# Patient Record
Sex: FEMALE | Race: WHITE
[De-identification: names, ages, dates, MRNs, and addresses within clinical notes are randomized per-mention and may not be internally consistent; named-entity substitution may affect disease eponyms.]

---

## 2017-11-30 ENCOUNTER — HOSPITAL ENCOUNTER (OUTPATIENT)
Dept: HOSPITAL 56 - MW.SDS | Age: 82
Discharge: HOME | End: 2017-11-30
Attending: PAIN MEDICINE
Payer: MEDICARE

## 2017-11-30 DIAGNOSIS — Z79.899: ICD-10-CM

## 2017-11-30 DIAGNOSIS — Z88.0: ICD-10-CM

## 2017-11-30 DIAGNOSIS — G89.4: ICD-10-CM

## 2017-11-30 DIAGNOSIS — F32.9: ICD-10-CM

## 2017-11-30 DIAGNOSIS — F41.9: ICD-10-CM

## 2017-11-30 DIAGNOSIS — Z87.891: ICD-10-CM

## 2017-11-30 DIAGNOSIS — M96.1: ICD-10-CM

## 2017-11-30 DIAGNOSIS — M48.062: ICD-10-CM

## 2017-11-30 DIAGNOSIS — Z88.8: ICD-10-CM

## 2017-11-30 DIAGNOSIS — K21.9: ICD-10-CM

## 2017-11-30 DIAGNOSIS — E78.00: ICD-10-CM

## 2017-11-30 DIAGNOSIS — M46.88: Primary | ICD-10-CM

## 2017-11-30 DIAGNOSIS — Z98.890: ICD-10-CM

## 2017-11-30 DIAGNOSIS — Z79.82: ICD-10-CM

## 2017-11-30 DIAGNOSIS — Z86.010: ICD-10-CM

## 2017-11-30 DIAGNOSIS — Z88.1: ICD-10-CM

## 2017-11-30 DIAGNOSIS — I10: ICD-10-CM

## 2017-11-30 DIAGNOSIS — M53.3: ICD-10-CM

## 2017-11-30 DIAGNOSIS — G47.33: ICD-10-CM

## 2017-11-30 DIAGNOSIS — M43.16: ICD-10-CM

## 2017-11-30 DIAGNOSIS — Z99.89: ICD-10-CM

## 2017-11-30 DIAGNOSIS — Z90.710: ICD-10-CM

## 2017-11-30 DIAGNOSIS — Z98.1: ICD-10-CM

## 2017-11-30 PROCEDURE — G0260 INJ FOR SACROILIAC JT ANESTH: HCPCS

## 2017-11-30 PROCEDURE — 27096 INJECT SACROILIAC JOINT: CPT

## 2017-11-30 NOTE — OR
SURGEON:

Rosa Durant D.O.

 

DATE OF PROCEDURE:  2017

 

OR STAFF PRESENT:

1. LALITO Waddell.

2. SHAYY Heard RT.

 

WOUND CLASSIFICATION:

I.

 

PREOPERATIVE DIAGNOSES:

1. Right sacroiliac joint arthropathy.

2. Failed back surgery syndrome.

 

POSTOPERATIVE DIAGNOSES:

1. Right sacroiliac joint arthropathy.

2. Failed back surgery syndrome.

 

PROCEDURES PERFORMED:

1. Right sacroiliac joint injection.

2. Fluoroscopic guidance for needle placement.

3. Local with oral Valium for sedation.

 

SCREENING QUESTIONS:

The patient answered "No" to all the followin. Are you allergic to iodine, Betadine or latex?

2. Do you have a bleeding disorder?

3. Do you  have any joint replacements, heart valve replacements or a

    pacemaker?

4. Are you on any anti-inflammatories or blood thinners?

5. Do you have any current local or systemic infections?

 

MEDICAL NECESSITY:

This is a patient with a history of severe chronic  low back pain and sacroiliac

joint irritation with pain over the sacral sulcus and the buttocks region who

comes in for the above diagnostic and therapeutic procedure.  Please see medical

necessity note attached.  This procedure is being done in accordance with

guidelines as written by the [employer] (HAILEE).

 

DESCRIPTION OF PROCEDURE:

The patient had the procedure thoroughly explained including all possible risks,

benefits and alternatives.  Consent was signed in my clinic indicating

understanding and willingness to proceed.

 

The patient presented to the outpatient [facility] and was escorted to the

dressing room to disrobe and change into a hospital gown.  Preoperative vital

signs were taken and stable.  The patient reported that Valium was taken prior

to the procedure.

 

The patient was brought to the procedure room and placed in the prone position

on the procedure room table.  A pillow was placed under the hips in order to

flatten the lumbar lordosis.  The back was prepped with ChloraPrep and sterilely

draped.

 

All personnel in the operating room were dressed in appropriate attire including

surgical scrubs, head and shoe covers.  This was to ensure sterility while in

the treatment room.  During the time fluoroscopy was in use all personnel in the

operating room wore lead shields with thyroid collars.  Sterile technique was

used during the procedure.  The patient was awake and conversant throughout the

procedure.

 

The fluoroscope was positioned to provide an oblique view of the sacroiliac

joint.  There was no evidence of infection at the site of needle insertion.  The

skin was anesthetized with 2% Lidocaine with a sterile 27-gauge 1.5 inch needle.

Then under fluoroscopy  a 22-gauge 3.5 inch spinal needle was placed within the

sacroiliac joint in the lower one-third of the joint.  IsoVue-200 contrast dye

was injected under live fluoroscopy and no intravascular flow pattern was

observed.  After negative aspiration of heme, the following solution was

injected: 0.5% Ropivacaine, Celestone and 2% Lidocaine.

 

The patient tolerated the procedure well and vital signs were stable during and

after the procedure.

 

The staff escorted the patient to the recovery area and the patient was released

to home in stable condition after a brief stay in the recovery room monitored by

the nurse.  The patient was given both oral and written discharge and follow up

instructions.

 

Recommended follow up in two weeks.  The patient is able to contact the office

if there are any additional problems or questions in the meantime.  The patient

was given discharge instruction and verbalizes understanding including

understanding of those signs and symptoms that would require emergency care.

 

PREOPERATIVE PAIN:

8/10 with activity.

 

POSTOPERATIVE PAIN:

0/10 postop.

 

PLAN:

Follow up in the Pain Clinic in 3 weeks.

 

PRIMARY SURGEON:

 

 

SECONDARY SURGEON:

 

 

ASSISTANT:

 

 

REASON ASSISTANT WAS NECESSARY:

 

 

ROLE OF ASSISTANT:

 

 

 

LIZZIE HODGES

DD:  2017 13:19:42

DT:  2017 18:41:34

Job #:  [id number]

## 2018-01-26 ENCOUNTER — HOSPITAL ENCOUNTER (EMERGENCY)
Dept: HOSPITAL 56 - MW.ED | Age: 83
Discharge: HOME | End: 2018-01-26
Payer: MEDICARE

## 2018-01-26 VITALS — SYSTOLIC BLOOD PRESSURE: 190 MMHG | DIASTOLIC BLOOD PRESSURE: 92 MMHG
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
## 2018-01-26 DIAGNOSIS — Z88.0: ICD-10-CM

## 2018-01-26 DIAGNOSIS — Z87.891: ICD-10-CM

## 2018-01-26 DIAGNOSIS — Z88.5: ICD-10-CM

## 2018-01-26 DIAGNOSIS — S06.9X9A: Primary | ICD-10-CM

## 2018-01-26 DIAGNOSIS — S00.03XA: ICD-10-CM

## 2018-01-26 LAB
CHLORIDE SERPL-SCNC: 105 MMOL/L (ref 98–110)
SODIUM SERPL-SCNC: 143 MMOL/L (ref 136–146)

## 2018-01-26 PROCEDURE — 99285 EMERGENCY DEPT VISIT HI MDM: CPT

## 2018-01-26 PROCEDURE — 96374 THER/PROPH/DIAG INJ IV PUSH: CPT

## 2018-01-26 PROCEDURE — 36415 COLL VENOUS BLD VENIPUNCTURE: CPT

## 2018-01-26 PROCEDURE — 80053 COMPREHEN METABOLIC PANEL: CPT

## 2018-01-26 PROCEDURE — 71045 X-RAY EXAM CHEST 1 VIEW: CPT

## 2018-01-26 PROCEDURE — 72125 CT NECK SPINE W/O DYE: CPT

## 2018-01-26 PROCEDURE — 85025 COMPLETE CBC W/AUTO DIFF WBC: CPT

## 2018-01-26 PROCEDURE — 84484 ASSAY OF TROPONIN QUANT: CPT

## 2018-01-26 PROCEDURE — 70450 CT HEAD/BRAIN W/O DYE: CPT

## 2018-01-26 PROCEDURE — 93005 ELECTROCARDIOGRAM TRACING: CPT

## 2018-01-26 NOTE — EDM.PDOC
ED HPI GENERAL MEDICAL PROBLEM





- General


Chief Complaint: Head Injury


Stated Complaint: FALL


Time Seen by Provider: 01/26/18 17:25


Source of Information: Reports: Patient


History Limitations: Reports: No Limitations





- History of Present Illness


INITIAL COMMENTS - FREE TEXT/NARRATIVE: 


HISTORY AND PHYSICAL:





History of present illness:


Patient is an 85-year-old female who presents to the emergency room by EMS with 

complaints of fall. [place] ice, 

falling backwards and hit her head with a loss of consciousness. Upon arrival 

the patient is C-collared and backboarded in complaining of some upper chest 

pain where the straps of the board are resting and nausea. 





Review of systems: 


As per history of present illness and below otherwise all systems reviewed and 

negative.





Past medical history: 


As per history of present illness and as reviewed below otherwise 

noncontributory.





Surgical history: 


As per history of present illness and as reviewed below otherwise 

noncontributory.





Social history: 


No reported history of drug or alcohol abuse.





Family history: 


As per history of present illness and as reviewed below otherwise 

noncontributory.





Physical exam:


General: Nontoxic-appearing 85-year-old female. Alert and oriented. Appears in 

no acute distress


HEENT: No crepitus/tenderness with palpation of the scalp, normocephalic, 

pupils reactive bilat, negative for conjunctival pallor or scleral icterus, 

mucous membranes moist, [place], throat clear, neck supple, nontender, trachea 

midline.


Lungs: Clear to auscultation, breath sounds equal bilaterally, chest has 

reproducable chest pain to upper sternum and clavicle area.


Heart: S1S2, regular and rhythm, w/o murmur


Abdomen: Soft, nondistended, nontender. Negative for masses or 

hepatosplenomegaly. Negative for costovertebral tenderness.


Pelvis: Stable nontender.


Genitourinary: Deferred.


Rectal: Deferred.


Extremities: Moves all extremities per self, full ROM without difficulty or 

deficits, negative for cords or calf pain. Neurovascular unremarkable.


Neuro: Awake, alert, oriented. Cranial nerves II through XII unremarkable. 

Cerebellum unremarkable. Motor and sensory unremarkable throughout. Exam 

nonfocal.





Upon arrival patient was removed from the backboard log rolled with assist 3. 

Patient denies any pinpoint vertebral tenderness upon palpation. C-collar on 

and intact. After patient was removed from the backboard she stated the pain on 

her sternum where the straps were crossing has alleviated. The patient 

describes the fall she stated that she slipped on the ice. She denied any 

shortness of breath or chest pain upon the incident.





CBC, CMP and troponin are normal. EKG shows sinus rhythm. Chest x-ray shows no 

fractures, infiltrates or pneumonia. Head CT shows no acute fractures or 

intracranial findings. There is a small midline scalp hematoma. There was an 

incidental finding of a 3 mm ill-defined nodule in the left lung apex, they did 

recommend this be followed up in 12 months if the patient is at high risk. 

Patient does have a 10 year pack history of smoking and her early 20's. I did 

share this information with her and her daughter who is at bedside, she states 

she'll follow-up with her primary care provider. Vital signs stable.


Patient is requesting something for pain at this time. The daughter is driving 

the patient home. We'll give her one tablet of tramadol here. The daughter doesn

't live with the patient and states that she will monitor closely.





Diagnostics:


CBC, CMP, troponin, EKG, chest x-ray, head CT, CT cervical spine





Therapeutics:


Zofran





Impression: 


#1 Fall


#2 Scalp hematoma


#3 Head injury





Plan:


1. Please apply ice to the painful areas the first 24 hours. Then you may 

alternate ice and heat as desired. Take your medication as prescribed. May 

cause drowsiness so do not drive while taking this medication, reserve for 

nightime or evening use. Otherwise may use and/or ibuprofen.


2. Follow-up with your primary caregiver in the next couple days for 

reevaluation. Return to the ED as needed and as discussed.





Definitive disposition and diagnosis as appropriate pending reevaluation and 

review of above.





Onset: Today


Duration: Minutes:


Location: Reports: Chest


Associated Symptoms: Reports: Nausea/Vomiting.  Denies: Confusion, Chest Pain, 

Cough, cough w sputum, Diaphoresis, Fever/Chills, Headaches, Loss of Appetite, 

Malaise, Rash, Seizure, Shortness of Breath, Syncope, Weakness


  ** Bilateral Shoulder


Pain Score (Numeric/FACES): 7





- Related Data


 Allergies











Allergy/AdvReac Type Severity Reaction Status Date / Time


 


meperidine HCl [From Demerol] Allergy  Vomiting Verified 01/26/18 17:22


 


Penicillins Allergy  Rash Verified 01/26/18 17:22











Home Meds: 


 Home Meds





Albuterol [Ventolin HFA] 1 - 2 puff INH ASDIRECTED PRN 04/22/15 [History]


Esomeprazole Magnesium 1 tab PO ACBRK 04/22/15 [History]


Furosemide 1 tab PO ASDIRECTED 04/22/15 [History]


Levomilnacipran Hydrochloride [Fetzima] 1 tab PO DAILY 04/22/15 [History]


Levomilnacipran Hydrochloride [Fetzima] 40 mg PO BEDTIME 04/22/15 [History]


NIFEdipine [Nifedical Xl] 1 tab PO BEDTIME 04/22/15 [History]


Potassium Chloride 1 tab PO DAILY 04/22/15 [History]


Simvastatin 1 tab PO BEDTIME 04/22/15 [History]


Triamterene/Hydrochlorothiazid [Triamterene-HCTZ 75-50 MG] 1 tab PO BRK 04/22/

15 [History]


Esomeprazole [NexIUM] 40 mg PO DAILY 04/23/15 [History]











Social & Family History





- Tobacco Use


Smoking Status *Q: Former Smoker (QUIT AT LEAST 20 YEARS AGO)





- Alcohol Use


Days Per Week of Alcohol Use: 0


Number of Drinks Per Day: 0


Total Drinks Per Week: 0





- Recreational Drug Use


Recreational Drug Use: Yes


Drug Use in Last 12 Months: No





ED ROS GENERAL





- Review of Systems


Review Of Systems: ROS reveals no pertinent complaints other than HPI.





ED EXAM, HEAD INJURY





- Physical Exam


Exam: See Below (See dictation)





Course





- Vital Signs


Last Recorded V/S: 


 Last Vital Signs











Temp  98.5 F   01/26/18 17:17


 


Pulse  89   01/26/18 17:17


 


Resp  18   01/26/18 17:17


 


BP  190/92 H  01/26/18 17:17


 


Pulse Ox  99   01/26/18 17:17














- Orders/Labs/Meds


Labs: 


 Laboratory Tests











  01/26/18 01/26/18 Range/Units





  17:50 17:50 


 


WBC  9.96   (4.0-11.0)  K/uL


 


RBC  4.82   (4.30-5.90)  M/uL


 


Hgb  13.5   (12.0-16.0)  g/dL


 


Hct  40.2   (36.0-46.0)  %


 


MCV  83.4   (80.0-98.0)  fL


 


MCH  28.0   (27.0-32.0)  pg


 


MCHC  33.6   (31.0-37.0)  g/dL


 


RDW Std Deviation  50.2   (28.0-62.0)  fl


 


RDW Coeff of Brody  17 H   (11.0-15.0)  %


 


Plt Count  166   (150-400)  K/uL


 


MPV  10.60   (7.40-12.00)  fL


 


Neut % (Auto)  71.3   (48.0-80.0)  %


 


Lymph % (Auto)  19.4   (16.0-40.0)  %


 


Mono % (Auto)  4.0   (0.0-15.0)  %


 


Eos % (Auto)  5.1   (0.0-7.0)  %


 


Baso % (Auto)  0.2   (0.0-1.5)  %


 


Neut # (Auto)  7.1 H   (1.4-5.7)  K/uL


 


Lymph # (Auto)  1.9   (0.6-2.4)  K/uL


 


Mono # (Auto)  0.4   (0.0-0.8)  K/uL


 


Eos # (Auto)  0.5   (0.0-0.7)  K/uL


 


Baso # (Auto)  0.0   (0.0-0.1)  K/uL


 


Nucleated RBC %  0.0   /100WBC


 


Nucleated RBCs #  0   K/uL


 


Sodium   143  (136-146)  mmol/L


 


Potassium   3.6  (3.5-5.1)  mmol/L


 


Chloride   105  ()  mmol/L


 


Carbon Dioxide   28  (21-31)  mmol/L


 


BUN   19  (6.0-23.0)  mg/dL


 


Creatinine   0.9  (0.6-1.5)  mg/dL


 


Est Cr Clr Drug Dosing   42.78  mL/min


 


Estimated GFR (MDRD)   59.5  ml/min


 


Glucose   106  ()  mg/dL


 


Calcium   10.5  (8.8-10.8)  mg/dL


 


Total Bilirubin   0.9  (0.1-1.5)  mg/dL


 


AST   21  (5-40)  IU/L


 


ALT   15  (8-54)  IU/L


 


Alkaline Phosphatase   120  ()  


 


Troponin I   < 0.10  (0.0-0.29)  NG/ML


 


Total Protein   7.5  (6.0-8.0)  g/dL


 


Albumin   4.6  (3.4-4.8)  g/dL


 


Globulin   2.9  (2.0-3.5)  g/dL


 


Albumin/Globulin Ratio   1.6  (1.3-2.8)  











Meds: 


Medications














Discontinued Medications














Generic Name Dose Route Start Last Admin





  Trade Name Alphonseq  PRN Reason Stop Dose Admin


 


Ondansetron HCl  4 mg  01/26/18 17:32  01/26/18 18:10





  Zofran  IVPUSH  01/26/18 17:33  4 mg





  ONETIME ONE   Administration


 


Ondansetron HCl  Confirm  01/26/18 17:33  01/26/18 19:04





  Zofran  Administered  01/26/18 17:34  Not Given





  Dose   





  4 mg   





  .ROUTE   





  .STK-MED ONE   


 


Tramadol HCl  50 mg  01/26/18 19:13  01/26/18 19:17





  Ultram  PO  01/26/18 19:14  50 mg





  ONETIME ONE   Administration














Departure





- Departure


Time of Disposition: 19:17


Disposition: Home, Self-Care 01


Clinical Impression: 


 Hematoma





Fall


Qualifiers:


 Encounter type: initial encounter Qualified Code(s): W19.XXXA - Unspecified 

fall, initial encounter





Head injury, acute, with loss of consciousness


Qualifiers:


 Encounter type: initial encounter Qualified Code(s): S06.9X9A - Unspecified 

intracranial injury with loss of consciousness of unspecified duration, initial 

encounter








- Discharge Information


Instructions:  Head Injury, Adult, Easy-to-Read, Hematoma, Easy-to-Read


Referrals: 


PCP,None [Primary Care Provider] - 


Forms:  ED Department Discharge


Additional Instructions: 


My general discharge


The following information is given to patients seen in the emergency department 

who are being discharged to home. This information is to outline your options 

for follow-up care. We provide all patients seen in our emergency department 

with a follow-up referral.





The need for follow-up, as well as the timing and circumstances, are variable 

depending upon the specifics of your emergency department visit.





If you don't have a primary care physician on staff, we will provide you with a 

referral. We always advise you to contact your personal physician following an 

emergency department visit to inform them of the circumstance of the visit and 

for follow-up with them and/or the need for any referrals to a consulting 

specialist.





The emergency department will also refer you to a specialist when appropriate. 

This referral assures that you have the opportunity for follow-up care with a 

specialist. All of these measure are taken in an effort to provide you with 

optimal care, which includes your follow-up.





Under all circumstances we always encourage you to contact your private 

physician who remains a resource for coordinating your care. When calling for 

follow-up care, please make the office aware that this follow-up is from your 

recent emergency room visit. If for any reason you are refused follow-up, 

please contact the [facility] Emergency 

Department at [phone] and asked to speak to the emergency department 

charge nurse.





[facility]


Primary Care


[address] [place] [id number]


Phone: [phone]


Fax: [phone]





1. Please apply ice to the painful areas the first 24 hours. Then you may 

alternate ice and heat as desired. Take your medication as prescribed. Tramadol 

may cause drowsiness, so do not drive while taking this medication, reserve for 

nighttime or evening use. Otherwise may use and/or ibuprofen during the day.


2. Follow-up with your primary caregiver in the next couple days for 

reevaluation. Return to the ED as needed and as discussed.
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
## 2019-07-18 ENCOUNTER — HOSPITAL ENCOUNTER (OUTPATIENT)
Dept: HOSPITAL 56 - MW.SDS | Age: 84
Discharge: HOME | End: 2019-07-18
Attending: UROLOGY
Payer: MEDICARE

## 2019-07-18 VITALS — DIASTOLIC BLOOD PRESSURE: 78 MMHG | SYSTOLIC BLOOD PRESSURE: 136 MMHG

## 2019-07-18 DIAGNOSIS — F41.9: ICD-10-CM

## 2019-07-18 DIAGNOSIS — M06.9: ICD-10-CM

## 2019-07-18 DIAGNOSIS — N13.2: Primary | ICD-10-CM

## 2019-07-18 DIAGNOSIS — M48.062: ICD-10-CM

## 2019-07-18 DIAGNOSIS — Z99.89: ICD-10-CM

## 2019-07-18 DIAGNOSIS — I10: ICD-10-CM

## 2019-07-18 DIAGNOSIS — M18.11: ICD-10-CM

## 2019-07-18 DIAGNOSIS — E78.00: ICD-10-CM

## 2019-07-18 DIAGNOSIS — Z88.0: ICD-10-CM

## 2019-07-18 DIAGNOSIS — K59.09: ICD-10-CM

## 2019-07-18 DIAGNOSIS — Z88.5: ICD-10-CM

## 2019-07-18 DIAGNOSIS — K21.9: ICD-10-CM

## 2019-07-18 DIAGNOSIS — Z87.891: ICD-10-CM

## 2019-07-18 DIAGNOSIS — M43.16: ICD-10-CM

## 2019-07-18 DIAGNOSIS — Z79.899: ICD-10-CM

## 2019-07-18 DIAGNOSIS — F40.240: ICD-10-CM

## 2019-07-18 DIAGNOSIS — G47.33: ICD-10-CM

## 2019-07-18 DIAGNOSIS — F32.9: ICD-10-CM

## 2019-07-18 PROCEDURE — C1769 GUIDE WIRE: HCPCS

## 2019-07-18 PROCEDURE — A4217 STERILE WATER/SALINE, 500 ML: HCPCS

## 2019-07-18 PROCEDURE — 76000 FLUOROSCOPY <1 HR PHYS/QHP: CPT

## 2019-07-18 PROCEDURE — 52353 CYSTOURETERO W/LITHOTRIPSY: CPT

## 2019-07-18 NOTE — [id number]
Post Anesthesia Note





- EVALUATION WITHIN 48HRS OF ANESTHETIC


Vital Signs in Normal Range: Yes


Patient Participated in Evaluation: Yes


Respiratory Function Stable: Yes


Airway Patent: Yes


Cardiovascular Function Stable: Yes


Hydration Status Stable: Yes


Pain Control Satisfactory: Yes


Nausea and Vomiting Control Satisfactory: Yes


Mental Status Recovered: Yes


Resp Rate: 14





- COMMENTS/OBSERVATIONS


Free Text/Narrative:: 





no anesthesia problems

## 2019-07-18 NOTE — OR
SURGEON:

Bill Patel M.D.

 

DATE OF PROCEDURE:  07/18/2019

 

PREOPERATIVE DIAGNOSIS:

Two right ureteral stones.

 

POSTOPERATIVE DIAGNOSIS:

Two right ureteral stones.

 

OPERATION:

Ureteroscopy, laser lithotripsy.

 

DESCRIPTION OF PROCEDURE:

The patient was given general anesthesia.  She is in dorsal lithotomy position,

prepped and draped in sterile drapes.  Cystourethroscopy was done that was

normal.  A guidewire was attempted, would not go through the stones, so a

Glidewire was then placed and the lower ureter was dilated to approximately 15-

W[place].  The rigid ureteroscope was advanced in the right ureter and the stone

fragments behind UVJ were removed.  Going up into the ureter that had already

been dilated, I was able to reach the mid ureteral stones and those were broken

up with the laser and cleaned out.  With that done, the procedure was

terminated.  The bladder was emptied, and the patient was moved to recovery room

in good condition.

 

 

BEN / CARROLL

DD:  07/18/2019 09:29:42

DT:  07/18/2019 11:30:36

Job #:  [id number]

## 2019-07-18 NOTE — CR
EXAMINATION: Pelvis

 

HISTORY: Operative procedure

 

COMPARISON: 7/17/2019

 

TECHNIQUE: 2 views

 

FINDINGS/IMPRESSION: Operative control films demonstrate selection of the right

ureter.

## 2019-07-18 NOTE — PCM.PREANE
Preanesthetic Assessment





- Anesthesia/Transfusion/Family Hx


Anesthesia History: No Prior Anesthesia (slow to wake up)


Other Type of Anesthesia Reaction Comment: "Only time sick was with Ether and 

Demerol administered"


Family History of Anesthesia Reaction: No


Transfusion History: No Prior Transfusion(s)


Intubation History: Unknown





- Review of Systems


General: No Symptoms


Pulmonary: No Symptoms


Cardiovascular: No Symptoms


Gastrointestinal: Abdominal Pain


Neurological: No Symptoms


Other: Reports: None





- Physical Assessment


O2 Sat by Pulse Oximetry: 93


Respiratory Rate: 15


Vital Signs: 





 Last Vital Signs











Temp  36.1 C   07/18/19 07:02


 


Pulse  67   07/18/19 07:02


 


Resp  15   07/18/19 07:02


 


BP  136/92 H  07/18/19 07:02


 


Pulse Ox  93 L  07/18/19 07:02











Height: 5 ft 4 in


Weight: 66.678 kg


ASA Class: 3


Mental Status: Alert & Oriented x3


Airway Class: Mallampati = 2


Dentition: Reports: Normal Dentition


Thyro-Mental Finger Breadths: 3


Mouth Opening Finger Breadths: 2


ROM/Head Extension: Limited/Partial


Lungs: Clear to Auscultation, Normal Respiratory Effort, Crackles


Cardiovascular: Regular Rate, Regular Rhythm





- Allergies


Allergies/Adverse Reactions: 


 Allergies











Allergy/AdvReac Type Severity Reaction Status Date / Time


 


meperidine HCl [From Demerol] Allergy  Vomiting Verified 07/17/19 15:58


 


Penicillins Allergy  Rash Verified 07/17/19 15:58














- Blood


Blood Available: No





- Anesthesia Plan


Pre-Op Medication Ordered: None





- Acknowledgements


Anesthesia Type Planned: General Anesthesia





PreAnesthesia Questionnaire


HEENT History: Reports: Hard of Hearing, Other (See Below)


Other HEENT History: wears glasses,  has bilateral hearing aides


Cardiovascular History: Reports: High Cholesterol, Hypertension


Respiratory History: Reports: Sleep Apnea, SOB (with minimal exertion), Other (

See Below) (lung nodule)


Other Respiratory History: uses CPAP


Gastrointestinal History: Reports: GERD, Hiatal Hernia, Other (See Below) (h/o 

gastric polyp)


Genitourinary History: Reports: Renal Calculus, Urinary Incontinence


OB/GYN History: Reports: Pregnancy


Musculoskeletal History: Reports: Back Pain, Chronic, Fracture, Osteoarthritis


Other Musculoskeletal History: hx of fx wrist


Neurological History: Reports: Neuropathy, Peripheral, Other (See Below) (

positional vertigo)


Psychiatric History: Reports: Anxiety, Depression, Other (See Below) (

claustrophobia)


Dermatologic History: Reports: Other (See Below)


Other Dermatologic History: dermititis on scalp





- Infectious Disease History


Infectious Disease History: Reports: Chicken Pox, Measles, Mumps





- Past Surgical History


Head Surgeries/Procedures: Reports: None


HEENT Surgical History: Reports: Cataract Surgery, Naso-Sinus Surgery, 

Tonsillectomy


GI Surgical History: Reports: Colonoscopy, Hernia, Inguinal, Other (See Below)


Other GI Surgeries/Procedures: I&D of Perirectal abscess


Female  Surgical History: Reports: Hysterectomy, Salpingo-Oophorectomy


Neurological Surgical History: Reports: Lumbar Spine, Spinal Fusion


Other Neurological Surgeries/Procedures: L3-4-5,  has hardware


Musculoskeletal Surgical History: Reports: Arthroscopic Knee





- SUBSTANCE USE


Smoking Status *Q: Former Smoker (quit 20 years ago)


Recreational Drug Use History: No





- HOME MEDS


Home Medications: 


 Home Meds





Sertraline HCl [Zoloft] 100 mg PO DAILY 08/03/18 [History]


Cholecalciferol (Vitamin D3) [Vitamin D3] 1,000 unit PO DAILY 07/17/19 [History]


Cyanocobalamin (Vitamin B12) [Vitamin B12] 1,000 mcg PO DAILY 07/17/19 [History]


Esomeprazole Magnesium [Nexium 24Hr] 20 mg PO QAM 07/17/19 [History]


Multivitamin with Minerals [Hair, Skin and Nails] 1 tab PO DAILY 07/17/19 [

History]











- CURRENT (IN HOUSE) MEDS


Current Meds: 





 Current Medications





Lactated Ringer's (Ringers, Lactated)  1,000 mls @ 100 mls/hr IV ASDIRECTED CHRIS


Sodium Chloride (Saline Flush)  10 ml FLUSH ASDIRECTED PRN


   PRN Reason: Keep Vein Open


Sodium Chloride (Saline Flush)  2.5 ml FLUSH ASDIRECTED PRN


   PRN Reason: Keep Vein Open


Sodium Chloride (Normal Saline)  10 ml IV ASDIRECTED PRN


   PRN Reason: IV Use





Discontinued Medications





Ciprofloxacin/Dextrose 400 mg/ (Premix)  200 mls @ 200 mls/hr IV ONCALL ONE


   Stop: 07/18/19 01:00


Ciprofloxacin/Dextrose (Cipro In D5w 400 Mg/200 Ml) Confirm Administered Dose 

200 mls @ as directed .ROUTE .STK-MED ONE


   Stop: 07/18/19 06:40

## 2019-08-16 ENCOUNTER — HOSPITAL ENCOUNTER (EMERGENCY)
Dept: HOSPITAL 56 - MW.ED | Age: 84
Discharge: HOME | End: 2019-08-16
Payer: MEDICARE

## 2019-08-16 VITALS — DIASTOLIC BLOOD PRESSURE: 61 MMHG | SYSTOLIC BLOOD PRESSURE: 121 MMHG

## 2019-08-16 DIAGNOSIS — Z23: ICD-10-CM
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
## 2019-08-16 DIAGNOSIS — Z88.0: ICD-10-CM

## 2019-08-16 DIAGNOSIS — Z88.5: ICD-10-CM

## 2019-08-16 DIAGNOSIS — Z87.891: ICD-10-CM

## 2019-08-16 DIAGNOSIS — S91.131A: Primary | ICD-10-CM

## 2019-08-16 NOTE — EDM.PDOC
ED HPI GENERAL MEDICAL PROBLEM





- General


Chief Complaint: General


Stated Complaint: PT HURT RT FT


Time Seen by Provider: 08/16/19 18:36


Source of Information: Reports: Patient


History Limitations: Reports: No Limitations





- History of Present Illness


INITIAL COMMENTS - FREE TEXT/NARRATIVE: 





HISTORY AND PHYSICAL:





History of present illness:


Patient is an 81-year-old female presents to the ED with complaint of needing 

tdap. Patient states that she stepped on a small tac that is used to tac down 

carpet this afternoon. She called her PCP and is not UTD on her tetanus so was 

advised to come to ED to have this. Patient states it bled a tiny bit, she 

cleaned it out with alcohol. 





Review of systems: 


As per history of present illness and below otherwise all systems reviewed and 

negative.





Past medical history: 


As per history of present illness and as reviewed below otherwise 

noncontributory.





Surgical history: 


As per history of present illness and as reviewed below otherwise 

noncontributory.





Social history: 


No reported history of drug or alcohol abuse.





Family history: 


As per history of present illness and as reviewed below otherwise 

noncontributory.





Physical exam:


General: Patient sitting comfortably in no acute distress and nontoxic appearing


HEENT: Atraumatic, normocephalic, pupils reactive, negative for conjunctival 

pallor or scleral icterus, mucous membranes moist, throat clear, neck supple, 

nontender, trachea midline. No meningeal signs. 


Lungs: Clear to auscultation, breath sounds equal bilaterally, chest nontender.


Heart: S1S2, regular, negative for clicks, rubs, or overt murmur.


Abdomen: Soft, nondistended, nontender. Negative for masses or 

hepatosplenomegaly. Negative for costovertebral tenderness. No rigidity, rebound

, guarding.


Pelvis: Stable nontender.


Genitourinary: Deferred.


Rectal: Deferred.


Extremities: There is a tiny small superficial puncture wound in the pad of the 

right great toe.  negative for cords or calf pain. Neurovascular unremarkable.


Neuro: Awake, alert, oriented. Cranial nerves II through XII unremarkable. 

Cerebellum unremarkable. Motor and sensory unremarkable throughout. Exam 

nonfocal.





Notes: 





Diagnostics:


none





Therapeutics:


Td





Prescriptions:


none





Impression: 


foot injury





Plan:


Follow up with primary care provider


Return to ED as needed as discussed 





Definitive disposition and diagnosis as appropriate pending reevaluation and 

review of above.








- Related Data


 Allergies











Allergy/AdvReac Type Severity Reaction Status Date / Time


 


meperidine HCl [From Demerol] Allergy  Vomiting Verified 08/16/19 18:28


 


Penicillins Allergy  Rash Verified 08/16/19 18:28











Home Meds: 


 Home Meds





Sertraline HCl [Zoloft] 100 mg PO DAILY 08/03/18 [History]


Cholecalciferol (Vitamin D3) [Vitamin D3] 1,000 unit PO DAILY 07/17/19 [History]


Cyanocobalamin (Vitamin B12) [Vitamin B12] 1,000 mcg PO DAILY 07/17/19 [History]


Esomeprazole Magnesium [Nexium 24Hr] 20 mg PO QAM 07/17/19 [History]


Multivitamin with Minerals [Hair, Skin and Nails] 1 tab PO DAILY 07/17/19 [

History]











Past Medical History


HEENT History: Reports: Hard of Hearing, Other (See Below)


Other HEENT History: wears glasses,  has bilateral hearing aides


Cardiovascular History: Reports: High Cholesterol, Hypertension


Respiratory History: Reports: Sleep Apnea, SOB, Other (See Below)


Other Respiratory History: uses CPAP


Gastrointestinal History: Reports: GERD, Hiatal Hernia, Other (See Below)


Genitourinary History: Reports: Renal Calculus, Urinary Incontinence


OB/GYN History: Reports: Pregnancy


Musculoskeletal History: Reports: Back Pain, Chronic, Fracture, Osteoarthritis


Other Musculoskeletal History: hx of fx wrist


Neurological History: Reports: Neuropathy, Peripheral, Other (See Below)


Psychiatric History: Reports: Anxiety, Depression, Other (See Below)


Endocrine/Metabolic History: Reports: None


Hematologic History: Reports: None


Immunologic History: Reports: None


Oncologic (Cancer) History: Reports: None


Dermatologic History: Reports: Other (See Below)


Other Dermatologic History: dermititis on scalp





- Infectious Disease History


Infectious Disease History: Reports: Chicken Pox, Measles, Mumps





- Past Surgical History


Head Surgeries/Procedures: Reports: None


HEENT Surgical History: Reports: Cataract Surgery, Naso-Sinus Surgery, 

Tonsillectomy


Cardiovascular Surgical History: Reports: None


Respiratory Surgical History: Reports: None


GI Surgical History: Reports: Colonoscopy, Hernia, Inguinal, Other (See Below)


Other GI Surgeries/Procedures: I&D of Perirectal abscess


Female  Surgical History: Reports: Hysterectomy, Salpingo-Oophorectomy


Endocrine Surgical History: Reports: None


Neurological Surgical History: Reports: Lumbar Spine, Spinal Fusion


Other Neurological Surgeries/Procedures: L3-4-5,  has hardware


Musculoskeletal Surgical History: Reports: Arthroscopic Knee


Oncologic Surgical History: Reports: None


Dermatological Surgical History: Reports: None





Social & Family History





- Family History


Family Medical History: Noncontributory





- Tobacco Use


Smoking Status *Q: Former Smoker


Used Tobacco, but Quit: Yes


Month/Year Tobacco Last Used: 20 years





- Caffeine Use


Caffeine Use: Reports: Coffee





- Recreational Drug Use


Recreational Drug Use: No





ED ROS GENERAL





- Review of Systems


Review Of Systems: ROS reveals no pertinent complaints other than HPI.





ED EXAM, GENERAL





- Physical Exam


Exam: See Below (see dictation)





Course





- Vital Signs


Last Recorded V/S: 


 Last Vital Signs











Temp  96.7 F   08/16/19 18:26


 


Pulse  77   08/16/19 18:26


 


Resp  16   08/16/19 18:26


 


BP  121/61   08/16/19 18:26


 


Pulse Ox  94 L  08/16/19 18:26














- Orders/Labs/Meds


Orders: 


 Active Orders 24 hr











 Category Date Time Status


 


 Vaccines to be Administered [RC] PER UNIT ROUTINE Care  08/16/19 18:33 Ordered


 


 Vaccines to be Administered [RC] PER UNIT ROUTINE Care  08/16/19 18:54 Active











Meds: 


Medications














Discontinued Medications














Generic Name Dose Route Start Last Admin





  Trade Name Donato  PRN Reason Stop Dose Admin


 


Diphtheria/Tetanus/Acell Pertussis  0.5 ml  08/16/19 18:32  08/16/19 18:53





  Boostrix  IM  08/16/19 18:33  Not Given





  .ONCE ONE   





     





     





     





     


 


Tetanus/Diphtheria Toxoids  Confirm  08/16/19 18:38  08/16/19 18:53





  Tenivac  Administered  08/16/19 18:39  Not Given





  Dose   





  0.5 ml   





  .ROUTE   





  .STK-MED ONE   





     





     





     





     


 


Tetanus/Diphtheria Toxoids  0.5 ml  08/16/19 18:54  08/16/19 18:56





  Tenivac  IM  08/16/19 18:55  0.5 ml





  .ONCE ONE   Administration





     





     





     





     














Departure





- Departure


Time of Disposition: 18:45


Disposition: Home, Self-Care 01


Condition: Good


Clinical Impression: 


 Foot injury








- Discharge Information


Instructions:  VIS, Tetanus, Diphtheria (Td); Tetanus, Diphtheria, Pertussis (

Tdap) - [facility]


Referrals: 


Leo Guzman MD [Primary Care Provider] - 


Forms:  ED Department Discharge


Additional Instructions: 


The following information is given to patients seen in the emergency department 

who are being discharged to home. This information is to outline your options 

for follow-up care. We provide all patients seen in our emergency department 

with a follow-up referral.





The need for follow-up, as well as the timing and circumstances, are variable 

depending upon the specifics of your emergency department visit.





If you don't have a primary care physician on staff, we will provide you with a 

referral. We always advise you to contact your personal physician following an 

emergency department visit to inform them of the circumstance of the visit and 

for follow-up with them and/or the need for any referrals to a consulting 

specialist.





The emergency department will also refer you to a specialist when appropriate. 

This referral assures that you have the opportunity for follow-up care with a 

specialist. All of these measure are taken in an effort to provide you with 

optimal care, which includes your follow-up.





Under all circumstances we always encourage you to contact your private 

physician who remains a resource for coordinating your care. When calling for 

follow-up care, please make the office aware that this follow-up is from your 

recent emergency room visit. If for any reason you are refused follow-up, 

please contact the [facility] Emergency 

Department at [phone] and asked to speak to the emergency department 

charge nurse.





[facility]


Primary Care


[address] [place] [address]


Phone: [phone]


Fax: [phone]





[place] [address]


Phone: [phone]


Fax: [phone]














Follow up with primary care provider


Return to ED as needed as discussed 





- My Orders


Last 24 Hours: 


My Active Orders





08/16/19 18:33


Vaccines to be Administered [RC] PER UNIT ROUTINE 





08/16/19 18:54


Vaccines to be Administered [RC] PER UNIT ROUTINE 














- Assessment/Plan


Last 24 Hours: 


My Active Orders





08/16/19 18:33


Vaccines to be Administered [RC] PER UNIT ROUTINE 





08/16/19 18:54


Vaccines to be Administered [RC] PER UNIT ROUTINE

## 2021-08-04 NOTE — CT
Indication:



Shortness of breath



Technique:



Volumetric multidetector CT images of the chest were obtained after the 

administration of IV contrast.



100 cc Isovue 370 low osmolar intravenous contrast



Comparison:



Single-view chest August 4, 2021



Findings:



The thoracic inlet and thyroid gland are unremarkable.



The thoracic aorta is non aneurysmal with scattered atherosclerotic 

calcifications.



There is no central filling defect to suggest pulmonary embolism.



There are reactive mediastinal and hilar lymph nodes appreciated.



There is moderate central bronchial thickening with mucoid impaction of 

lower lobe bronchi.



There is extensive interlobular septal thickening with trace right greater 

than left basilar pleural effusions with adjacent compressive atelectasis 

versus infiltrates with additional scattered ground-glass opacities. There 

is mild biapical pleural thickening.



There is no evidence of pulmonary mass or suspicious pulmonary nodule.



The partially visualized upper abdomen demonstrates mild hepatomegaly and 

hepatic steatosis.



The thoracic vertebral body heights demonstrate a chronic vertebral plana 

deformity of the T11 level. There is trace retropulsion of the posterior 

cortex measuring 7 millimeters.



Impression:



Extensive interlobular septal thickening, central bronchial thickening with 

trace bibasilar effusions with adjacent compressive atelectasis versus 

infiltrates consistent with extensive pulmonary edema and/or atypical 

infiltrates. No evidence of pulmonary embolus.



Please note that all CT scans at this facility use dose modulation, 

iterative reconstruction, and/or weight-based dosing when appropriate to 

reduce radiation dose to as low as reasonably achievable.



Dictated by Lucian Bradley MD @ 8/4/2021 3:42:52 PM



Signed by Dr. Lucian Bradley @ Aug  4 2021  3:42PM

## 2021-08-04 NOTE — CR
INDICATION:



Shortness of breath



TECHNIQUE:



Chest 1 views



COMPARISON:



May 14, 2020



FINDINGS:



Cardiovascular and mediastinum:  Heart size and vasculature are normal in 

caliber and appearance.  



Lungs and pleural spaces:  Lungs are clear.  No sign of infiltrate or mass. 

 No sign of pleural effusion.  No pneumothorax.  



Bones and soft tissues:  No significant findings.



IMPRESSION:



No acute findings and no significant changes from the prior exam.



Dictated by Luis F Doyle MD @ 8/4/2021 1:42:01 PM



Signed by Dr. Luis F Doyle @ Aug  4 2021  1:42PM

## 2021-08-04 NOTE — PCM.HP.2
<John Garcia - Last Filed: 21 19:29>





H&P History of Present Illness





- General


Date of Service: 21


Admit Problem/Dx: 


                           Admission Diagnosis/Problem





Admission Diagnosis/Problem      Pulmonary edema








Source of Information: Patient, Family


History Limitations: Reports: No Limitations





- History of Present Illness


Initial Comments - Free Text/Narative: 


89-year-old female with history of hypertension and hyperlipidemia presents with

5 days of shortness of breath and dyspnea on exertion.  She also has a open 

wound in her left groin/inner thigh area.  Patient lives at home with her 

daughter.  She walks with a walker at home and since Saturday has been 

experiencing dyspnea on exertion and increasing lower extremity edema.  She 

states she has been sleeping with the head of the bed elevated for the last 5 

years.  She denies paroxysmal nocturnal dyspnea.  She denies chest pain, cough, 

palpitations, abdominal pain, fever, chills, dysuria and fatigue.  Patient 

noticed a sore on her left inner thigh 2 days ago which she believes was likely 

there prior to that.  It has since increased in size and started draining 

purulent discharge.  She denies it to be painful.  She also states she has 

neuropathy.  She denies history of diabetes mellitus.  She denies history of 

cellulitis.  She denies history of CHF or COPD.  Her PCP discontinued her 

antihypertensive medications roughly 5 years ago because she no longer required 

them.  Upon chart review in  she was on Lasix, nifedipine, simvastatin and 

hydrochlorothiazide which she is no longer on.





ER course: Hemoglobin 9.5.  WBC 7.4.  BUN 20.  Creatinine 0.8.  Glucose 115.  

.  D-dimer 2.36.  CT angio did not show pulmonary embolism but was 

positive for extensive pulmonary edema.  EKG is normal sinus rhythm.  Patient 

started on 2 L nasal cannula.  CT abdomen pelvis did not show deep space 

infection or abscess in her left thigh.  Wound cultures pending.  Patient 

received clindamycin 600 mg.  Patient received IV Lasix 40 mg once.  

Azithromycin and ceftriaxone.





Past medical history: HTN, HLD, ASHLEY, GERD, incontinence, neuropathy, anxiety and

depression





Medications: 


Sertraline 100 mg daily.


Vitamin D3 1000 units daily


Vitamin B12 1000 mcg daily


Nexium omeprazole 20 mg





Allergies: Penicillin causes a rash.  Meperidine causes nausea/vomiting.





Surgical history: Hysterectomy, spinal fusion, hardware L3-5.  Perirectal 

abscess I&D.





Family history: Father had CHF.  Son recently  of pancreatic cancer.  Other 

son tonsillar cancer.  Daughter brain cancer.





Social history: Smoked half a pack per day for 10 years, quit 50 years ago.  

History of very mild alcohol use, socially.  Denies drug use.





CODE STATUS: DNR/DNI.


  ** Left Shoulder


Pain Score (Numeric/FACES): 8





- Related Data


Allergies/Adverse Reactions: 


                                    Allergies











Allergy/AdvReac Type Severity Reaction Status Date / Time


 


meperidine HCl [From Demerol] Allergy  Vomiting Verified 21 19:54


 


Penicillins Allergy  Rash Verified 21 19:54











Home Medications: 


                                    Home Meds





Sertraline HCl [Zoloft] 100 mg PO BID 18 [History]


Cholecalciferol (Vitamin D3) [Vitamin D3] 1,000 unit PO DAILY 19 [History]


Cyanocobalamin (Vitamin B12) [Vitamin B12] 1,000 mcg PO DAILY 19 [History]


Esomeprazole Magnesium [Nexium 24Hr] 20 mg PO QAM 19 [History]


Multivitamin with Minerals [Hair, Skin and Nails] 1 tab PO DAILY 19 

[History]


Carbidopa/Levodopa [Carbidopa-Levodopa ] 37.5 - 150 mg PO TID 21 

[History]


Docusate Sodium 100 mg PO DAILY 21 [History]


Finasteride 1 mg PO DAILY 21 [History]


Gabapentin [Neurontin] 100 mg PO TID 21 [History]


Ibuprofen 200 mg PO Q6H PRN 21 [History]


Loratadine [Claritin] 10 mg PO DAILY PRN 21 [History]


Simvastatin 20 mg PO BEDTIME 21 [History]











Past Medical History


HEENT History: Reports: Hard of Hearing, Other (See Below)


Other HEENT History: wears glasses,  has bilateral hearing aides


Cardiovascular History: Reports: High Cholesterol, Hypertension


Respiratory History: Reports: Sleep Apnea, SOB, Other (See Below)


Other Respiratory History: uses CPAP


Gastrointestinal History: Reports: GERD, Hiatal Hernia, Other (See Below)


Genitourinary History: Reports: Renal Calculus, Urinary Incontinence


OB/GYN History: Reports: Pregnancy


Musculoskeletal History: Reports: Back Pain, Chronic, Fracture, Osteoarthritis


Other Musculoskeletal History: hx of fx wrist


Neurological History: Reports: Neuropathy, Peripheral, Other (See Below)


Psychiatric History: Reports: Anxiety, Depression, Other (See Below)


Endocrine/Metabolic History: Reports: None


Hematologic History: Reports: None


Immunologic History: Reports: None


Oncologic (Cancer) History: Reports: None


Dermatologic History: Reports: Other (See Below)


Other Dermatologic History: dermititis on scalp





- Infectious Disease History


Infectious Disease History: Reports: Chicken Pox, Measles, Mumps





- Past Surgical History


Head Surgeries/Procedures: Reports: None


HEENT Surgical History: Reports: Cataract Surgery, Naso-Sinus Surgery, 

Tonsillectomy


Cardiovascular Surgical History: Reports: None


Respiratory Surgical History: Reports: None


GI Surgical History: Reports: Colonoscopy, Hernia, Inguinal, Other (See Below)


Other GI Surgeries/Procedures: I&D of Perirectal abscess


Female  Surgical History: Reports: Hysterectomy, Salpingo-Oophorectomy


Endocrine Surgical History: Reports: None


Neurological Surgical History: Reports: Lumbar Spine, Spinal Fusion


Other Neurological Surgeries/Procedures: L3-4-5,  has hardware


Musculoskeletal Surgical History: Reports: Arthroscopic Knee


Oncologic Surgical History: Reports: None


Dermatological Surgical History: Reports: None





Social & Family History





- Family History


Family Medical History: No Pertinent Family History





- Caffeine Use


Caffeine Use: Reports: Coffee





- Recreational Drug Use


Recreational Drug Use: No





H&P Review of Systems





- Review of Systems:


Review Of Systems: See Below


General: Denies: Fever, Chills


HEENT: Denies: No Symptoms


Pulmonary: Reports: Shortness of Breath.  Denies: Wheezing, Pleuritic Chest 

Pain, Cough


Cardiovascular: Reports: Dyspnea on Exertion, Orthopnea, Edema.  Denies: Chest 

Pain, Palpitations, PND, Lightheadedness, Syncope, Claudication


Gastrointestinal: Reports: No Symptoms.  Denies: Abdominal Pain, Black Stool, 

Bloody Stool, Constipation, Diarrhea, Decreased Appetite, Difficulty Swallowing,

 Hematemesis, Hematochezia, Melena, Nausea, Vomiting


Genitourinary: Reports: Incontinence.  Denies: Dysuria, Frequency, Burning, 

Hematuria


Musculoskeletal: Denies: Neck Pain, Joint Pain, Joint Swelling


Psychiatric: Reports: Depression, Anxiety


Neurological: Denies: Confusion, Dizziness, Headache, Seizure, Change in Speech


Hematologic/Lymphatic: Denies: Easy Bleeding, Easy Bruising





Exam





- Exam


Exam: See Below





- Vital Signs


Vital Signs: 


                                Last Vital Signs











Temp  97.6 F   21 12:05


 


Pulse  96   21 18:25


 


Resp  18   21 15:17


 


BP  108/42 L  21 18:25


 


Pulse Ox  92 L  21 18:25











Weight: 69.853 kg





- Exam


Quality Assessment: Supplemental Oxygen


General: Alert, Oriented, Cooperative


HEENT: Posterior Pharynx Clear, PERRLA.  No: Scleral Icterus


Neck: Supple.  No: Lymphadenopathy, JVD


Lungs: Decreased Breath Sounds, Crackles


Cardiovascular: Regular Rate, Regular Rhythm


GI/Abdominal Exam: Normal Bowel Sounds, Soft, Non-Tender, No Distention.  No: 

Guarding, Rebound


Back Exam: No: CVA Tenderness (L), CVA Tenderness (R)


Extremities: Pedal Edema.  No: Joint Swelling, Duy's Sign, Leg Pain


Peripheral Pulses: 2+: Dorsalis Pedis (L), Dorsalis Pedis (R)


Skin: Wound (5 cm circular ulcerated lesion with 1 mm rim of surrounding 

erythema and darkening.  Draining purulent discharge.)


Neurological: Strength Equal Bilateral, Normal Speech


Neuro Extensive - Mental Status: Alert, Oriented x3, Normal Mood/Affect





- Patient Data


Lab Results Last 24 hrs: 


                         Laboratory Results - last 24 hr











  21 Range/Units





  13:05 13:05 13:05 


 


WBC  7.44    (4.0-11.0)  K/uL


 


RBC  3.38 L    (4.30-5.90)  M/uL


 


Hgb  9.5 L    (12.0-16.0)  g/dL


 


Hct  28.3 L    (36.0-46.0)  %


 


MCV  83.7    (80.0-98.0)  fL


 


MCH  28.1    (27.0-32.0)  pg


 


MCHC  33.6    (31.0-37.0)  g/dL


 


RDW Std Deviation  53.1    (28.0-62.0)  fl


 


RDW Coeff of Brody  17 H    (11.0-15.0)  %


 


Plt Count  175    (150-400)  K/uL


 


MPV  10.30    (7.40-12.00)  fL


 


Neut % (Auto)  85.0 H    (48.0-80.0)  %


 


Lymph % (Auto)  7.0 L    (16.0-40.0)  %


 


Mono % (Auto)  7.1    (0.0-15.0)  %


 


Eos % (Auto)  0.8    (0.0-7.0)  %


 


Baso % (Auto)  0.1    (0.0-1.5)  %


 


Neut # (Auto)  6.3 H    (1.4-5.7)  K/uL


 


Lymph # (Auto)  0.5 L    (0.6-2.4)  K/uL


 


Mono # (Auto)  0.5    (0.0-0.8)  K/uL


 


Eos # (Auto)  0.1    (0.0-0.7)  K/uL


 


Baso # (Auto)  0.0    (0.0-0.1)  K/uL


 


Nucleated RBC %  0.0    /100WBC


 


Nucleated RBCs #  0    K/uL


 


INR     


 


APTT     (18.6-31.3)  SEC


 


D-Dimer, Quantitative     (0.0-0.50)  mg/L FEU


 


Sodium   140   (136-145)  mmol/L


 


Potassium   3.9   (3.5-5.1)  mmol/L


 


Chloride   104   ()  mmol/L


 


Carbon Dioxide   25.9   (21.0-32.0)  mmol/L


 


BUN   20 H   (7.0-18.0)  mg/dL


 


Creatinine   0.8   (0.6-1.0)  mg/dL


 


Est Cr Clr Drug Dosing   44.63   mL/min


 


Estimated GFR (MDRD)   > 60.0   ml/min


 


Glucose   115 H   ()  mg/dL


 


Calcium   9.4   (8.5-10.1)  mg/dL


 


Magnesium   2.0   (1.8-2.4)  mg/dL


 


Total Bilirubin   1.6 H   (0.2-1.0)  mg/dL


 


AST   23   (15-37)  IU/L


 


ALT   20   (14-63)  IU/L


 


Alkaline Phosphatase   157 H   ()  U/L


 


Troponin I   < 0.050   (0.000-0.056)  ng/mL


 


B-Natriuretic Peptide    299 H  (<100)  PG/ML


 


Total Protein   7.1   (6.4-8.2)  g/dL


 


Albumin   3.1 L   (3.4-5.0)  g/dL


 


Globulin   4.0   (2.6-4.0)  g/dL


 


Albumin/Globulin Ratio   0.8 L   (0.9-1.6)  


 


Urine Color     


 


Urine Appearance     


 


Urine pH     (5.0-8.0)  


 


Ur Specific Gravity     (1.001-1.035)  


 


Urine Protein     (NEGATIVE)  mg/dL


 


Urine Glucose (UA)     (NEGATIVE)  mg/dL


 


Urine Ketones     (NEGATIVE)  mg/dL


 


Urine Occult Blood     (NEGATIVE)  


 


Urine Nitrite     (NEGATIVE)  


 


Urine Bilirubin     (NEGATIVE)  


 


Urine Ictotest     


 


Urine Urobilinogen     (<2.0)  EU/dL


 


Ur Leukocyte Esterase     (NEGATIVE)  


 


Urine RBC     (0-2/HPF)  


 


Urine WBC     (0-5/HPF)  


 


Ur Epithelial Cells     (NONE-FEW)  


 


Calcium Oxalate Crystal     (NEGATIVE)  


 


Urine Bacteria     (NEGATIVE)  














  21 Range/Units





  13:05 14:14 


 


WBC    (4.0-11.0)  K/uL


 


RBC    (4.30-5.90)  M/uL


 


Hgb    (12.0-16.0)  g/dL


 


Hct    (36.0-46.0)  %


 


MCV    (80.0-98.0)  fL


 


MCH    (27.0-32.0)  pg


 


MCHC    (31.0-37.0)  g/dL


 


RDW Std Deviation    (28.0-62.0)  fl


 


RDW Coeff of Brody    (11.0-15.0)  %


 


Plt Count    (150-400)  K/uL


 


MPV    (7.40-12.00)  fL


 


Neut % (Auto)    (48.0-80.0)  %


 


Lymph % (Auto)    (16.0-40.0)  %


 


Mono % (Auto)    (0.0-15.0)  %


 


Eos % (Auto)    (0.0-7.0)  %


 


Baso % (Auto)    (0.0-1.5)  %


 


Neut # (Auto)    (1.4-5.7)  K/uL


 


Lymph # (Auto)    (0.6-2.4)  K/uL


 


Mono # (Auto)    (0.0-0.8)  K/uL


 


Eos # (Auto)    (0.0-0.7)  K/uL


 


Baso # (Auto)    (0.0-0.1)  K/uL


 


Nucleated RBC %    /100WBC


 


Nucleated RBCs #    K/uL


 


INR  1.17   


 


APTT  27.2   (18.6-31.3)  SEC


 


D-Dimer, Quantitative  2.36 H   (0.0-0.50)  mg/L FEU


 


Sodium    (136-145)  mmol/L


 


Potassium    (3.5-5.1)  mmol/L


 


Chloride    ()  mmol/L


 


Carbon Dioxide    (21.0-32.0)  mmol/L


 


BUN    (7.0-18.0)  mg/dL


 


Creatinine    (0.6-1.0)  mg/dL


 


Est Cr Clr Drug Dosing    mL/min


 


Estimated GFR (MDRD)    ml/min


 


Glucose    ()  mg/dL


 


Calcium    (8.5-10.1)  mg/dL


 


Magnesium    (1.8-2.4)  mg/dL


 


Total Bilirubin    (0.2-1.0)  mg/dL


 


AST    (15-37)  IU/L


 


ALT    (14-63)  IU/L


 


Alkaline Phosphatase    ()  U/L


 


Troponin I    (0.000-0.056)  ng/mL


 


B-Natriuretic Peptide    (<100)  PG/ML


 


Total Protein    (6.4-8.2)  g/dL


 


Albumin    (3.4-5.0)  g/dL


 


Globulin    (2.6-4.0)  g/dL


 


Albumin/Globulin Ratio    (0.9-1.6)  


 


Urine Color   ORANGE  


 


Urine Appearance   CLEAR  


 


Urine pH   6.0  (5.0-8.0)  


 


Ur Specific Gravity   1.025  (1.001-1.035)  


 


Urine Protein   TRACE H  (NEGATIVE)  mg/dL


 


Urine Glucose (UA)   NEGATIVE  (NEGATIVE)  mg/dL


 


Urine Ketones   TRACE H  (NEGATIVE)  mg/dL


 


Urine Occult Blood   NEGATIVE  (NEGATIVE)  


 


Urine Nitrite   NEGATIVE  (NEGATIVE)  


 


Urine Bilirubin   SMALL H  (NEGATIVE)  


 


Urine Ictotest   NEGATIVE  


 


Urine Urobilinogen   4.0 H  (<2.0)  EU/dL


 


Ur Leukocyte Esterase   NEGATIVE  (NEGATIVE)  


 


Urine RBC   0-2  (0-2/HPF)  


 


Urine WBC   0-1  (0-5/HPF)  


 


Ur Epithelial Cells   RARE  (NONE-FEW)  


 


Calcium Oxalate Crystal   FEW  (NEGATIVE)  


 


Urine Bacteria   RARE  (NEGATIVE)  











Result Diagrams: 


                                 21 13:05





                                 21 13:05





Sepsis Event Note





- Evaluation


Sepsis Screening Result: No Definite Risk





- Focused Exam


Vital Signs: 


                                   Vital Signs











  Temp Pulse Resp BP Pulse Ox


 


 21 18:25   96   108/42 L  92 L


 


 21 16:48   86   125/58 L  99


 


 21 15:17   85  18  151/52 H  96


 


 21 13:27   86  18  133/45 L  94 L


 


 21 12:05  97.6 F  92  18  135/90  89 L














- Problem List


(1) Acute pulmonary edema


SNOMED Code(s): 27123995


   ICD Code: J81.0 - ACUTE PULMONARY EDEMA   Status: Acute   Current Visit: Yes 

  





(2) Infected ulcer of skin


SNOMED Code(s): 7886240


   ICD Code: L98.499 - NON-PRESSURE CHRONIC ULCER OF SKIN OF SITES W UNSP 

SEVERITY; L08.9 - LOCAL INFECTION OF THE SKIN AND SUBCUTANEOUS TISSUE, UNSP   

Status: Acute   Current Visit: Yes   


Problem List Initiated/Reviewed/Updated: Yes


Orders Last 24hrs: 


                               Active Orders 24 hr











 Category Date Time Status


 


 Patient Status [ADT] Routine ADT  21 17:23 Active


 


 EKG Documentation Completion [RC] STAT Care  21 12:26 Active


 


 Oxygen Therapy [RC] ASDIRECTED Care  21 12:28 Active


 


 Oxygen Therapy [RC] PRN Care  21 18:28 Ordered


 


 Pulse Oximetry [RC] ASDIRECTED Care  21 12:26 Active


 


 Up ad Cammie [RC] ASDIRECTED Care  21 18:28 Ordered


 


 VTE/DVT Education [RC] PER UNIT ROUTINE Care  21 18:28 Ordered


 


 Vital Signs [RC] Q4H Care  21 18:28 Ordered


 


 2 Gram Sodium Diet [DIET] Diet  21 Dinner Ordered


 


 BASIC METABOLIC PANEL,BMP [CHEM] AM Lab  21 05:11 Ordered


 


 CBC WITH AUTO DIFF [HEME] AM Lab  21 05:11 Ordered


 


 CULTURE, ANAEROBE & AEROBE [MREF] Stat Lab  21 17:40 Received


 


 MAGNESIUM [CHEM] AM Lab  21 05:11 Ordered


 


 PHOSPHORUS [CHEM] AM Lab  21 05:11 Ordered


 


 Acetaminophen [TylenoL] Med  21 18:28 Ordered





 650 mg PO Q4H PRN   


 


 Enoxaparin [Lovenox] Med  21 18:30 Ordered





 40 mg SUBCUT Q24H   


 


 Sodium Chloride 0.9% [Saline Flush] Med  21 12:26 Active





 10 ml FLUSH ASDIRECTED PRN   


 


 Sodium Chloride 0.9% [Saline Flush] Med  21 12:26 Active





 2.5 ml FLUSH ASDIRECTED PRN   


 


 polyethylene glycoL 3350 [MiraLAX] Med  21 18:28 Ordered





 17 gm PO BEDTIME PRN   


 


 Saline Lock Insert [OM.PC] Stat Oth  21 12:26 Ordered


 


 Resuscitation Status Routine Resus Stat  21 18:28 Ordered








                                Medication Orders





Acetaminophen (Acetaminophen 325 Mg Tab)  650 mg PO Q4H PRN


   PRN Reason: Pain (Mild 1-3)/fever


Enoxaparin Sodium (Enoxaparin 40 Mg/0.4 Ml Syringe)  40 mg SUBCUT Q24H CHRIS


Polyethylene Glycol (Polyethylene Glycol 3350 Powder 17 Gm Packet)  17 gm PO 

BEDTIME PRN


   PRN Reason: Constipation


Sodium Chloride (Sodium Chloride 0.9% 10 Ml Syringe)  10 ml FLUSH ASDIRECTED PRN


   PRN Reason: Keep Vein Open


   Last Admin: 21 13:19  Dose: 10 ml


   Documented by: KAY


Sodium Chloride (Sodium Chloride 0.9% 2.5 Ml Syringe)  2.5 ml FLUSH ASDIRECTED 

PRN


   PRN Reason: Keep Vein Open


   Last Admin: 21 13:19  Dose: 2.5 ml


   Documented by: KAY








Assessment/Plan Comment:: 


89-year-old female with history of hypertension and unclear history of CHF is b

[facility] admitted for fluid overload due to possible new onset CHF with elevated BNP

 and signs and symptoms of congestion including dyspnea on exertion, pedal edema

 and orthopnea.  Patient was previously on Lasix, nifedipine and 

hydrochlorothiazide until .  Unclear if she was diagnosed with CHF in the 

past.  CT angio showed extensive pulmonary edema.  Patient also has a infected 

skin ulcer located in her left groin/inner thigh.


Start patient on Lasix 40 mg IV twice daily.  De-escalate based on fluid status.

  We will get an echocardiogram.  Patient will be on telemetry. 2 L fluid 

restriction.  Strict I's and O's.  Daily weights.  2 g sodium diet restriction. 

 Consider starting aspirin and ACE inhibitor.


For the infected skin ulcer we will continue the patient on clindamycin IV.


DVT prophylaxis with Lovenox 40 mg.  Tylenol 650 mg every 4 hours as needed for 

pain.  MiraLAX as needed.  Daily labs.





CODE STATUS: DNR/DNI.








<Allyson Drummond - Last Filed: 21 14:38>





H&P History of Present Illness





- General


Admit Problem/Dx: 


                           Admission Diagnosis/Problem





Admission Diagnosis/Problem      Pulmonary edema











Exam





- Vital Signs


Vital Signs: 


                                Last Vital Signs











Temp  37.2 C   21 12:00


 


Pulse  85   21 12:00


 


Resp  20   21 12:00


 


BP  130/59 L  21 12:00


 


Pulse Ox  93 L  21 12:00














- Patient Data


Lab Results Last 24 hrs: 


                         Laboratory Results - last 24 hr











  21 Range/Units





  14:14 06:10 06:10 


 


WBC   6.12   (4.0-11.0)  K/uL


 


RBC   3.35 L   (4.30-5.90)  M/uL


 


Hgb   9.3 L   (12.0-16.0)  g/dL


 


Hct   27.9 L   (36.0-46.0)  %


 


MCV   83.3   (80.0-98.0)  fL


 


MCH   27.8   (27.0-32.0)  pg


 


MCHC   33.3   (31.0-37.0)  g/dL


 


RDW Std Deviation   52.7   (28.0-62.0)  fl


 


RDW Coeff of Brody   17 H   (11.0-15.0)  %


 


Plt Count   176   (150-400)  K/uL


 


MPV   10.10   (7.40-12.00)  fL


 


Neut % (Auto)   80.8 H   (48.0-80.0)  %


 


Lymph % (Auto)   9.3 L   (16.0-40.0)  %


 


Mono % (Auto)   8.0   (0.0-15.0)  %


 


Eos % (Auto)   1.6   (0.0-7.0)  %


 


Baso % (Auto)   0.3   (0.0-1.5)  %


 


Neut # (Auto)   4.9   (1.4-5.7)  K/uL


 


Lymph # (Auto)   0.6   (0.6-2.4)  K/uL


 


Mono # (Auto)   0.5   (0.0-0.8)  K/uL


 


Eos # (Auto)   0.1   (0.0-0.7)  K/uL


 


Baso # (Auto)   0.0   (0.0-0.1)  K/uL


 


Nucleated RBC %   0.0   /100WBC


 


Nucleated RBCs #   0   K/uL


 


Sodium    140  (136-145)  mmol/L


 


Potassium    3.1 L  (3.5-5.1)  mmol/L


 


Chloride    102  ()  mmol/L


 


Carbon Dioxide    27.9  (21.0-32.0)  mmol/L


 


BUN    19 H  (7.0-18.0)  mg/dL


 


Creatinine    0.9  (0.6-1.0)  mg/dL


 


Est Cr Clr Drug Dosing    39.67  mL/min


 


Estimated GFR (MDRD)    59.0  ml/min


 


Glucose    114 H  ()  mg/dL


 


Calcium    9.6  (8.5-10.1)  mg/dL


 


Phosphorus    3.7  (2.6-4.7)  mg/dL


 


Magnesium    1.8  (1.8-2.4)  mg/dL


 


Urine Ictotest  NEGATIVE    


 


Urine RBC  0-2    (0-2/HPF)  


 


Urine WBC  0-1    (0-5/HPF)  


 


Ur Epithelial Cells  RARE    (NONE-FEW)  


 


Calcium Oxalate Crystal  FEW    (NEGATIVE)  


 


Urine Bacteria  RARE    (NEGATIVE)  











Result Diagrams: 


                                 21 06:10





                                 21 06:10





Sepsis Event Note





- Focused Exam


Vital Signs: 


                                   Vital Signs











  Temp Pulse Resp BP Pulse Ox


 


 21 12:00  37.2 C  85  20  130/59 L  93 L


 


 21 08:00  36.8 C  78  20  114/57 L  92 L


 


 21 04:43  37.2 C  85  20  117/57 L  92 L











Orders Last 24hrs: 


                               Active Orders 24 hr











 Category Date Time Status


 


 Patient Status [ADT] Routine ADT  21 17:23 Active


 


 Daily Weight [Height and Weight] [RC] DAILY Care  21 19:17 Active


 


 Intake and Output Strict [RC] Q12H Care  21 19:16 Active


 


 Oxygen Therapy [RC] PRN Care  21 18:28 Active


 


 RT Aerosol Therapy [RC] ASDIRECTED Care  21 09:55 Active


 


 Telemetry Monitoring [Cardiac Monitoring] [RC] .AS Care  21 19:16 Active





 DIRECTED   


 


 Telemetry Monitoring [Cardiac Monitoring] [RC] Q8H Care  21 18:24 Active


 


 Up ad Cammie [RC] ASDIRECTED Care  21 18:28 Active


 


 VTE/DVT Education [RC] PER UNIT ROUTINE Care  21 18:28 Active


 


 Vital Signs [RC] Q4H Care  21 18:28 Active


 


 Consult to Wound Care Services [CONS] Routine Cons  21 10:34 Active


 


 PT Evaluation and Treatment [CONS] Routine Cons  21 09:54 Active


 


 2 Gram Sodium Diet [DIET] Diet  21 Dinner Active


 


 Fluid Restriction [DIET] Diet  21 Breakfast Active


 


 Echo Comp wo Cont [] Routine Exams  21 19:17 Taken


 


 CULTURE, ANAEROBE & AEROBE [MREF] Stat Lab  21 17:40 Received


 


 Acetaminophen [TylenoL] Med  21 18:28 Active





 650 mg PO Q4H PRN   


 


 Albuterol/Ipratropium [DuoNeb 3.0-0.5 MG/3 ML] Med  21 10:00 Active





 3 ml NEB Q4HRRT PRN   


 


 Carbidopa/Levodopa [Sinemet  mg] Med  21 08:00 Active





 1.5 tab PO TID   


 


 Clindamycin Phosphate in D5W [Cleocin in D5W 600 MG/50 Med  21 23:15 

Active





 ML] 50 ml   





 IV Q8H   


 


 Enoxaparin [Lovenox] Med  21 21:00 Active





 30 mg SUBCUT Q24H   


 


 Furosemide [Lasix] Med  21 05:00 Active





 40 mg IVPUSH Q12H   


 


 Gabapentin [Neurontin] Med  21 08:00 Active





 100 mg PO TID   


 


 LORazepam [Ativan] Med  21 23:06 Active





 0.5 mg IVPUSH ONETIME PRN   


 


 Pantoprazole [ProTONIX***] Med  21 07:30 Active





 40 mg PO ACBREAKFAST   


 


 Sertraline [Zoloft] Med  21 23:15 Active





 100 mg PO DAILY   


 


 polyethylene glycoL 3350 [MiraLAX] Med  21 18:28 Active





 17 gm PO BEDTIME PRN   


 


 Resuscitation Status Routine Resus Stat  21 18:28 Ordered








                                Medication Orders





Acetaminophen (Acetaminophen 325 Mg Tab)  650 mg PO Q4H PRN


   PRN Reason: Pain (Mild 1-3)/fever


Albuterol/Ipratropium (Albuterol/Ipratropium 3.0-0.5 Mg/3 Ml Neb Soln)  3 ml NEB

 Q4HRRT PRN


   PRN Reason: sob/wheezing


Carbidopa/Levodopa (Carbidopa/Levodopa  Mg Tab)  1.5 tab PO TID [facility]


   Last Admin: 21 08:32  Dose: 1.5 tab


   Documented by: CHINEDU


Enoxaparin Sodium (Enoxaparin 30 Mg/0.3 Ml Syringe)  30 mg SUBCUT Q24H [facility]


Furosemide (Furosemide 40 Mg/4 Ml Vial)  40 mg IVPUSH Q12H [facility]


   Last Admin: 21 04:47  Dose: 40 mg


   Documented by: CHRISTOPHER


Gabapentin (Gabapentin 100 Mg Cap)  100 mg PO TID [facility]


   Last Admin: 21 08:31  Dose: 100 mg


   Documented by: CHINEDU


Clindamycin Phosphate (Cleocin In D5w 600 Mg/50 Ml)  50 mls @ 100 mls/hr IV Q8H 

[facility]


   Last Admin: 21 06:32  Dose: 92.593 mls/hr


   Documented by: CHRISTOPHER


   Infusion: 21 23:54  Dose: 92.593 mls/hr


   Documented by: CHRISTOPHER


   Admin: 21 23:21  Dose: 92.593 mls/hr


   Documented by: CHRISTOPHER


Lorazepam (Lorazepam 2 Mg/Ml Sdv)  0.5 mg IVPUSH ONETIME PRN


   PRN Reason: Anxiety


Pantoprazole Sodium (Pantoprazole 40 Mg Tab.Cr)  40 mg PO ACBREAKFAST [facility]


   Last Admin: 21 06:32  Dose: 40 mg


   Documented by: CHRISTOPHER


Polyethylene Glycol (Polyethylene Glycol 3350 Powder 17 Gm Packet)  17 gm PO 

BEDTIME PRN


   PRN Reason: Constipation


Sertraline HCl (Sertraline 100 Mg Tab)  100 mg PO DAILY [facility]


   Last Admin: 21 08:31  Dose: 100 mg


   Documented by: CHINEDU


   Admin: 21 23:21  Dose: 100 mg


   Documented by: CHRISTOPHER


Sodium Chloride (Sodium Chloride 0.9% 10 Ml Syringe)  10 ml FLUSH ASDIRECTED PRN


   PRN Reason: Keep Vein Open


   Last Admin: 21 13:19  Dose: 10 ml


   Documented by: KAY


Sodium Chloride (Sodium Chloride 0.9% 2.5 Ml Syringe)  2.5 ml FLUSH ASDIRECTED 

PRN


   PRN Reason: Keep Vein Open


   Last Admin: 21 13:19  Dose: 2.5 ml


   Documented by: KAY








Assessment/Plan Comment:: 





I have seen and evaluated the patient and agree with the residents note unless 

specified in my note














I performed a history and physical exam of the patient and discussed management 

with resident. I have reviewed the residents note and agree with documented 

findings and plan unless otherwise specified in my note.

## 2021-08-04 NOTE — EDM.PDOC
ED HPI GENERAL MEDICAL PROBLEM





- General


Stated Complaint: SHORTNESS OF BREATH ON  OPENED SORE ON LEFT LEG


Time Seen by Provider: 08/04/21 11:35


Source of Information: Reports: Patient


History Limitations: Reports: No Limitations





- History of Present Illness


INITIAL COMMENTS - FREE TEXT/NARRATIVE: 





89-year-old female past medical history dementia presents for shortness of 

breath and sore on left inner thigh.  Patient is a good historian.  She notes 

that for the past 4 days she has been experiencing shortness of breath which is 

worse with exertion.  She also notes a achy pain in her midepigastrium.  She has

trouble distinguishing whether or not the pain is pleuritic.  She typically 

sleeps sitting up at baseline so she is uncertain if she is have any orthopnea. 

She does not have a history of CHF or COPD.  She does not wear oxygen at home.  

She does not take diuretics.  She has not noticed worsening swelling of her 

lower extremities.  She denies any abdominal pain, nausea, vomiting.  She also 

first noted 2 days ago a sore on her left inner thigh which seems to be getting 

bigger and today is noted to be surrounded by area of redness.  It is mildly 

tender to touch per patient.  She notes that it appears open and is draining 

purulent material.  She denies any fevers.


  ** Left Shoulder


Pain Score (Numeric/FACES): 8





- Related Data


                                    Allergies











Allergy/AdvReac Type Severity Reaction Status Date / Time


 


meperidine HCl [From Demerol] Allergy  Vomiting Verified 08/04/21 12:08


 


Penicillins Allergy  Rash Verified 08/04/21 12:08











Home Meds: 


                                    Home Meds





Sertraline HCl [Zoloft] 100 mg PO DAILY 08/03/18 [History]


Cholecalciferol (Vitamin D3) [Vitamin D3] 1,000 unit PO DAILY 07/17/19 [History]


Cyanocobalamin (Vitamin B12) [Vitamin B12] 1,000 mcg PO DAILY 07/17/19 [History]


Esomeprazole Magnesium [Nexium 24Hr] 20 mg PO QAM 07/17/19 [History]


Multivitamin with Minerals [Hair, Skin and Nails] 1 tab PO DAILY 07/17/19 

[History]











Past Medical History


HEENT History: Reports: Hard of Hearing, Other (See Below)


Other HEENT History: wears glasses,  has bilateral hearing aides


Cardiovascular History: Reports: High Cholesterol, Hypertension


Respiratory History: Reports: Sleep Apnea, SOB, Other (See Below)


Other Respiratory History: uses CPAP


Gastrointestinal History: Reports: GERD, Hiatal Hernia, Other (See Below)


Genitourinary History: Reports: Renal Calculus, Urinary Incontinence


OB/GYN History: Reports: Pregnancy


Musculoskeletal History: Reports: Back Pain, Chronic, Fracture, Osteoarthritis


Other Musculoskeletal History: hx of fx wrist


Neurological History: Reports: Neuropathy, Peripheral, Other (See Below)


Psychiatric History: Reports: Anxiety, Depression, Other (See Below)


Endocrine/Metabolic History: Reports: None


Hematologic History: Reports: None


Immunologic History: Reports: None


Oncologic (Cancer) History: Reports: None


Dermatologic History: Reports: Other (See Below)


Other Dermatologic History: dermititis on scalp





- Infectious Disease History


Infectious Disease History: Reports: Chicken Pox, Measles, Mumps





- Past Surgical History


Head Surgeries/Procedures: Reports: None


HEENT Surgical History: Reports: Cataract Surgery, Naso-Sinus Surgery, 

Tonsillectomy


Cardiovascular Surgical History: Reports: None


Respiratory Surgical History: Reports: None


GI Surgical History: Reports: Colonoscopy, Hernia, Inguinal, Other (See Below)


Other GI Surgeries/Procedures: I&D of Perirectal abscess


Female  Surgical History: Reports: Hysterectomy, Salpingo-Oophorectomy


Endocrine Surgical History: Reports: None


Neurological Surgical History: Reports: Lumbar Spine, Spinal Fusion


Other Neurological Surgeries/Procedures: L3-4-5,  has hardware


Musculoskeletal Surgical History: Reports: Arthroscopic Knee


Oncologic Surgical History: Reports: None


Dermatological Surgical History: Reports: None





Social & Family History





- Family History


Family Medical History: No Pertinent Family History





- Caffeine Use


Caffeine Use: Reports: Coffee





ED ROS GENERAL





- Review of Systems


Review Of Systems: Comprehensive ROS is negative, except as noted in HPI.





ED EXAM, GENERAL





- Physical Exam


Exam: See Below


Exam Limited By: No Limitations


General Appearance: Alert, WD/WN, No Apparent Distress


Ears: Hearing Grossly Normal


Throat/Mouth: Normal Voice, No Airway Compromise


Head: Atraumatic, Normocephalic


Neck: Normal Inspection


Respiratory/Chest: No Respiratory Distress, Lungs Clear, Normal Breath Sounds, 

No Accessory Muscle Use


Cardiovascular: Normal Peripheral Pulses, Regular Rate, Rhythm, Other (mild b/l 

LE pitting edema, symmetric)


GI/Abdominal: Soft, Non-Tender


Extremities: Normal Inspection


Neurological: Alert, Normal Cognition


Psychiatric: Normal Affect, Normal Mood


Skin Exam: Warm, Dry, Intact, Other (lesion on L inner thigh approximately 3-cm 

which appears to be an open furuncle draining purulent material; roughly 2-cm 

surroudnign ertyhema; no fluctuance appreciated)


  ** #1 Interpretation


EKG Date: 08/04/21


Time: 12:59


Rhythm: NSR


Rate (Beats/Min): 89


Axis: Normal


P-Wave: Present


QRS: Normal


ST-T: Normal


QT: Normal


PA/PQ Interval: 171


EKG Interpretation Comments: 





normal EKG





Course





- Vital Signs


Last Recorded V/S: 


                                Last Vital Signs











Temp  97.6 F   08/04/21 12:05


 


Pulse  85   08/04/21 15:17


 


Resp  18   08/04/21 15:17


 


BP  151/52 H  08/04/21 15:17


 


Pulse Ox  96   08/04/21 15:17














- Orders/Labs/Meds


Orders: 


                               Active Orders 24 hr











 Category Date Time Status


 


 EKG Documentation Completion [RC] STAT Care  08/04/21 12:26 Active


 


 Oxygen Therapy [RC] ASDIRECTED Care  08/04/21 12:28 Active


 


 Pulse Oximetry [RC] ASDIRECTED Care  08/04/21 12:26 Active


 


 CULTURE, ANAEROBE & AEROBE [MREF] Stat Lab  08/04/21 17:08 Ordered


 


 Azithromycin [Zithromax] 500 mg Med  08/04/21 17:08 Ordered





 Sodium Chloride 0.9% [Normal Saline (AdvBag)] 250 ml   





 IV ONETIME   


 


 Sodium Chloride 0.9% [Saline Flush] Med  08/04/21 12:26 Active





 10 ml FLUSH ASDIRECTED PRN   


 


 Sodium Chloride 0.9% [Saline Flush] Med  08/04/21 12:26 Active





 2.5 ml FLUSH ASDIRECTED PRN   


 


 cefTRIAXone [Rocephin in Dextrose,Iso-Osm 1 GM/50 ML] 1 Med  08/04/21 17:08 

Ordered





 gm   





 Premix Bag 1 bag   





 IV ONETIME   


 


 Saline Lock Insert [OM.PC] Stat Oth  08/04/21 12:26 Ordered








                                Medication Orders





Azithromycin 500 mg/ Sodium (Chloride)  250 mls @ 250 mls/hr IV ONETIME STA


   Stop: 08/04/21 18:07


Ceftriaxone Sodium/Dextrose 1 (gm/ Premix)  50 mls @ 100 mls/hr IV ONETIME ONE


   Stop: 08/04/21 17:37


Sodium Chloride (Sodium Chloride 0.9% 10 Ml Syringe)  10 ml FLUSH ASDIRECTED PRN


   PRN Reason: Keep Vein Open


   Last Admin: 08/04/21 13:19  Dose: 10 ml


   Documented by: KAY


Sodium Chloride (Sodium Chloride 0.9% 2.5 Ml Syringe)  2.5 ml FLUSH ASDIRECTED 

PRN


   PRN Reason: Keep Vein Open


   Last Admin: 08/04/21 13:19  Dose: 2.5 ml


   Documented by: KAY








Labs: 


                                Laboratory Tests











  08/04/21 08/04/21 08/04/21 Range/Units





  13:05 13:05 13:05 


 


WBC  7.44    (4.0-11.0)  K/uL


 


RBC  3.38 L    (4.30-5.90)  M/uL


 


Hgb  9.5 L    (12.0-16.0)  g/dL


 


Hct  28.3 L    (36.0-46.0)  %


 


MCV  83.7    (80.0-98.0)  fL


 


MCH  28.1    (27.0-32.0)  pg


 


MCHC  33.6    (31.0-37.0)  g/dL


 


RDW Std Deviation  53.1    (28.0-62.0)  fl


 


RDW Coeff of Brody  17 H    (11.0-15.0)  %


 


Plt Count  175    (150-400)  K/uL


 


MPV  10.30    (7.40-12.00)  fL


 


Neut % (Auto)  85.0 H    (48.0-80.0)  %


 


Lymph % (Auto)  7.0 L    (16.0-40.0)  %


 


Mono % (Auto)  7.1    (0.0-15.0)  %


 


Eos % (Auto)  0.8    (0.0-7.0)  %


 


Baso % (Auto)  0.1    (0.0-1.5)  %


 


Neut # (Auto)  6.3 H    (1.4-5.7)  K/uL


 


Lymph # (Auto)  0.5 L    (0.6-2.4)  K/uL


 


Mono # (Auto)  0.5    (0.0-0.8)  K/uL


 


Eos # (Auto)  0.1    (0.0-0.7)  K/uL


 


Baso # (Auto)  0.0    (0.0-0.1)  K/uL


 


Nucleated RBC %  0.0    /100WBC


 


Nucleated RBCs #  0    K/uL


 


INR     


 


APTT     (18.6-31.3)  SEC


 


D-Dimer, Quantitative     (0.0-0.50)  mg/L FEU


 


Sodium   140   (136-145)  mmol/L


 


Potassium   3.9   (3.5-5.1)  mmol/L


 


Chloride   104   ()  mmol/L


 


Carbon Dioxide   25.9   (21.0-32.0)  mmol/L


 


BUN   20 H   (7.0-18.0)  mg/dL


 


Creatinine   0.8   (0.6-1.0)  mg/dL


 


Est Cr Clr Drug Dosing   44.63   mL/min


 


Estimated GFR (MDRD)   > 60.0   ml/min


 


Glucose   115 H   ()  mg/dL


 


Calcium   9.4   (8.5-10.1)  mg/dL


 


Magnesium   2.0   (1.8-2.4)  mg/dL


 


Total Bilirubin   1.6 H   (0.2-1.0)  mg/dL


 


AST   23   (15-37)  IU/L


 


ALT   20   (14-63)  IU/L


 


Alkaline Phosphatase   157 H   ()  U/L


 


Troponin I   < 0.050   (0.000-0.056)  ng/mL


 


B-Natriuretic Peptide    299 H  (<100)  PG/ML


 


Total Protein   7.1   (6.4-8.2)  g/dL


 


Albumin   3.1 L   (3.4-5.0)  g/dL


 


Globulin   4.0   (2.6-4.0)  g/dL


 


Albumin/Globulin Ratio   0.8 L   (0.9-1.6)  


 


Urine Color     


 


Urine Appearance     


 


Urine pH     (5.0-8.0)  


 


Ur Specific Gravity     (1.001-1.035)  


 


Urine Protein     (NEGATIVE)  mg/dL


 


Urine Glucose (UA)     (NEGATIVE)  mg/dL


 


Urine Ketones     (NEGATIVE)  mg/dL


 


Urine Occult Blood     (NEGATIVE)  


 


Urine Nitrite     (NEGATIVE)  


 


Urine Bilirubin     (NEGATIVE)  


 


Urine Ictotest     


 


Urine Urobilinogen     (<2.0)  EU/dL


 


Ur Leukocyte Esterase     (NEGATIVE)  


 


Urine RBC     (0-2/HPF)  


 


Urine WBC     (0-5/HPF)  


 


Ur Epithelial Cells     (NONE-FEW)  


 


Calcium Oxalate Crystal     (NEGATIVE)  


 


Urine Bacteria     (NEGATIVE)  














  08/04/21 08/04/21 Range/Units





  13:05 14:14 


 


WBC    (4.0-11.0)  K/uL


 


RBC    (4.30-5.90)  M/uL


 


Hgb    (12.0-16.0)  g/dL


 


Hct    (36.0-46.0)  %


 


MCV    (80.0-98.0)  fL


 


MCH    (27.0-32.0)  pg


 


MCHC    (31.0-37.0)  g/dL


 


RDW Std Deviation    (28.0-62.0)  fl


 


RDW Coeff of Brody    (11.0-15.0)  %


 


Plt Count    (150-400)  K/uL


 


MPV    (7.40-12.00)  fL


 


Neut % (Auto)    (48.0-80.0)  %


 


Lymph % (Auto)    (16.0-40.0)  %


 


Mono % (Auto)    (0.0-15.0)  %


 


Eos % (Auto)    (0.0-7.0)  %


 


Baso % (Auto)    (0.0-1.5)  %


 


Neut # (Auto)    (1.4-5.7)  K/uL


 


Lymph # (Auto)    (0.6-2.4)  K/uL


 


Mono # (Auto)    (0.0-0.8)  K/uL


 


Eos # (Auto)    (0.0-0.7)  K/uL


 


Baso # (Auto)    (0.0-0.1)  K/uL


 


Nucleated RBC %    /100WBC


 


Nucleated RBCs #    K/uL


 


INR  1.17   


 


APTT  27.2   (18.6-31.3)  SEC


 


D-Dimer, Quantitative  2.36 H   (0.0-0.50)  mg/L FEU


 


Sodium    (136-145)  mmol/L


 


Potassium    (3.5-5.1)  mmol/L


 


Chloride    ()  mmol/L


 


Carbon Dioxide    (21.0-32.0)  mmol/L


 


BUN    (7.0-18.0)  mg/dL


 


Creatinine    (0.6-1.0)  mg/dL


 


Est Cr Clr Drug Dosing    mL/min


 


Estimated GFR (MDRD)    ml/min


 


Glucose    ()  mg/dL


 


Calcium    (8.5-10.1)  mg/dL


 


Magnesium    (1.8-2.4)  mg/dL


 


Total Bilirubin    (0.2-1.0)  mg/dL


 


AST    (15-37)  IU/L


 


ALT    (14-63)  IU/L


 


Alkaline Phosphatase    ()  U/L


 


Troponin I    (0.000-0.056)  ng/mL


 


B-Natriuretic Peptide    (<100)  PG/ML


 


Total Protein    (6.4-8.2)  g/dL


 


Albumin    (3.4-5.0)  g/dL


 


Globulin    (2.6-4.0)  g/dL


 


Albumin/Globulin Ratio    (0.9-1.6)  


 


Urine Color   ORANGE  


 


Urine Appearance   CLEAR  


 


Urine pH   6.0  (5.0-8.0)  


 


Ur Specific Gravity   1.025  (1.001-1.035)  


 


Urine Protein   TRACE H  (NEGATIVE)  mg/dL


 


Urine Glucose (UA)   NEGATIVE  (NEGATIVE)  mg/dL


 


Urine Ketones   TRACE H  (NEGATIVE)  mg/dL


 


Urine Occult Blood   NEGATIVE  (NEGATIVE)  


 


Urine Nitrite   NEGATIVE  (NEGATIVE)  


 


Urine Bilirubin   SMALL H  (NEGATIVE)  


 


Urine Ictotest   NEGATIVE  


 


Urine Urobilinogen   4.0 H  (<2.0)  EU/dL


 


Ur Leukocyte Esterase   NEGATIVE  (NEGATIVE)  


 


Urine RBC   0-2  (0-2/HPF)  


 


Urine WBC   0-1  (0-5/HPF)  


 


Ur Epithelial Cells   RARE  (NONE-FEW)  


 


Calcium Oxalate Crystal   FEW  (NEGATIVE)  


 


Urine Bacteria   RARE  (NEGATIVE)  











Meds: 


Medications











Generic Name Dose Route Start Last Admin





  Trade Name Freq  PRN Reason Stop Dose Admin


 


Azithromycin 500 mg/ Sodium  250 mls @ 250 mls/hr  08/04/21 17:08 





  Chloride  IV  08/04/21 18:07 





  ONETIME STA  


 


Ceftriaxone Sodium/Dextrose 1  50 mls @ 100 mls/hr  08/04/21 17:08 





  gm/ Premix  IV  08/04/21 17:37 





  ONETIME ONE  


 


Sodium Chloride  10 ml  08/04/21 12:26  08/04/21 13:19





  Sodium Chloride 0.9% 10 Ml Syringe  FLUSH   10 ml





  ASDIRECTED PRN   Administration





  Keep Vein Open  


 


Sodium Chloride  2.5 ml  08/04/21 12:26  08/04/21 13:19





  Sodium Chloride 0.9% 2.5 Ml Syringe  FLUSH   2.5 ml





  ASDIRECTED PRN   Administration





  Keep Vein Open  














Discontinued Medications














Generic Name Dose Route Start Last Admin





  Trade Name Freq  PRN Reason Stop Dose Admin


 


Furosemide  20 mg  08/04/21 16:53 





  Furosemide 20 Mg/2 Ml Vial  IVPUSH  08/04/21 16:54 





  ONETIME ONE  


 


Clindamycin Phosphate 600 mg/  50 mls @ 100 mls/hr  08/04/21 12:26  08/04/21 

13:19





  Premix  IV  08/04/21 12:55  100 mls/hr





  ONETIME ONE   Administration


 


Sodium Chloride  500 mls @ 999 mls/hr  08/04/21 14:47  08/04/21 15:13





  Normal Saline  IV  08/04/21 15:17  999 mls/hr





  .Bolus ONE   Administration


 


Iopamidol  100 ml  08/04/21 14:56  08/04/21 15:07





  Iopamidol 755 Mg/Ml 50 Ml Bottle  IV  08/04/21 14:57  100 ml





  ONETIME STA   Administration














- Re-Assessments/Exams


Free Text/Narrative Re-Assessment/Exam: 





08/04/21 13:59


Patient's labs are remarkable for markedly elevated D-dimer.  Will get CT a 

chest to rule out pulmonary embolism.  Will scan the abdomen and pelvis to 

capture the left inner thigh wound to assess for abscess or deep space 

infection.


08/04/21 16:52


Patient CT does not show evidence of pulmonary embolism but does show extensive 

pulmonary edema.  Patient is still requiring 2 L nasal cannula oxygen to 

maintain normal O2 sats.  Will give 40 mg Lasix now.  CT scan does not reveal 

any evidence of deep space infection or drainable abscess.  Patient has been 

given a dose of clindamycin in the emergency department.  At a very long 

discussion with the patient and her caretaker regarding the findings and 

necessity to stay in the hospital.  Patient really does not want to stay in the 

hospital.  I will give her a few moments to discuss it with caretaker and her 

daughter and disposition accordingly.


08/04/21 17:09


Spoke with hospitalist who agrees with plan to admit patient for cellulitis and 

pulmonary edema. 40 mg of Lasix has been given. Hospitalist request adding on 

ceftriaxone and azithromycin for potential pneumonia. This has been ordered. We 

also get a wound culture.





Departure





- Departure


Time of Disposition: 17:09


Disposition: Admitted As Inpatient 66


Condition: Good


Clinical Impression: 


Pulmonary edema


Qualifiers:


 Chronicity: acute Qualified Code(s): J81.0 - Acute pulmonary edema





Cellulitis


Qualifiers:


 Site of cellulitis: extremity Site of cellulitis of extremity: lower extremity 

Laterality: left Qualified Code(s): L03.116 - Cellulitis of left lower limb








- Discharge Information


Referrals: 


Leo Guzman MD [Primary Care Provider] - 





Sepsis Event Note (ED)





- Focused Exam


Vital Signs: 


                                   Vital Signs











  Temp Pulse Resp BP Pulse Ox


 


 08/04/21 15:17   85  18  151/52 H  96


 


 08/04/21 13:27   86  18  133/45 L  94 L


 


 08/04/21 12:05  97.6 F  92  18  135/90  89 L














- My Orders


Last 24 Hours: 


My Active Orders





08/04/21 12:26


EKG Documentation Completion [RC] STAT 


Pulse Oximetry [RC] ASDIRECTED 


Sodium Chloride 0.9% [Saline Flush]   10 ml FLUSH ASDIRECTED PRN 


Sodium Chloride 0.9% [Saline Flush]   2.5 ml FLUSH ASDIRECTED PRN 


Saline Lock Insert [OM.PC] Stat 





08/04/21 12:28


Oxygen Therapy [RC] ASDIRECTED 





08/04/21 17:08


CULTURE, ANAEROBE & AEROBE [MREF] Stat 


cefTRIAXone [Rocephin in Dextrose,Iso-Osm 1 GM/50 ML] 1 gm   Premix Bag 1 bag IV

ONETIME 





08/04/21 17:08


Azithromycin [Zithromax] 500 mg   Sodium Chloride 0.9% [Normal Saline (AdvBag)] 

250 ml IV ONETIME 














- Assessment/Plan


Last 24 Hours: 


My Active Orders





08/04/21 12:26


EKG Documentation Completion [RC] STAT 


Pulse Oximetry [RC] ASDIRECTED 


Sodium Chloride 0.9% [Saline Flush]   10 ml FLUSH ASDIRECTED PRN 


Sodium Chloride 0.9% [Saline Flush]   2.5 ml FLUSH ASDIRECTED PRN 


Saline Lock Insert [OM.PC] Stat 





08/04/21 12:28


Oxygen Therapy [RC] ASDIRECTED 





08/04/21 17:08


CULTURE, ANAEROBE & AEROBE [MREF] Stat 


cefTRIAXone [Rocephin in Dextrose,Iso-Osm 1 GM/50 ML] 1 gm   Premix Bag 1 bag IV

ONETIME 





08/04/21 17:08


Azithromycin [Zithromax] 500 mg   Sodium Chloride 0.9% [Normal Saline (AdvBag)] 

250 ml IV ONETIME

## 2021-08-04 NOTE — CT
INDICATION:



Shortness breath. Left groin wound.



TECHNIQUE:



CT abdomen and pelvis with intravenous contrast, 100 mL of Isovue-370. 

Coronal and sagittal formats.



COMPARISON:



CT 07/12/2019.



FINDINGS:



Interval development of small bilateral pleural effusions as well as a 

small volume pericardial effusion. Partial compressive atelectasis at the 

lung bases. Imaged lower lungs demonstrate smooth interlobular septal 

thickening, as may be seen with pulmonary edema.



Normal liver contour. No suspicious hepatic lesion. Portal and hepatic 

veins patent. No biliary dilatation. The gallbladder, pancreas, spleen, and 

adrenals are unremarkable.



Symmetric renal enhancement. Type small right renal cyst. Subcentimeter 

hypoattenuating lesion in the left upper kidney appears unchanged from 

prior but remains too small to accurately characterize. No hydronephrosis 

bilaterally. Decompressed urinary bladder.



The bowel appears normal in caliber and enhancement diffusely. Small hiatal 

hernia. No free-air, free-fluid, or focal collection. No lymphadenopathy. 

Normal caliber abdominal aorta with moderate atherosclerotic changes. Major 

branch vessels appear patent. 



Diffuse demineralization. T11 compression fracture with severe height loss 

is newly apparent from comparison CT but visualized on chest radiograph 

05/14/2020. Status post L3-L5 posterior spinal fusion.



Left inguinal extensive regional skin thickening and subcutaneous 

stranding. No focal collection or soft tissue gas. Multiple prominent left 

inguinal lymph nodes.



IMPRESSION:



1. Left inguinal soft tissue changes as may be seen with cellulitis in the 

appropriate clinical setting. No organized/drainable collection or soft 

tissue gas.



2. Pulmonary edema with small bilateral pleural effusions and a small 

pericardial effusion.



3. Chronic T11 vertebral compression fracture.



Dictated by Jason Multani MD @ 8/4/2021 4:40:12 PM



Please note that all CT scans at this facility use dose modulation, 

iterative reconstruction, and/or weight-based dosing when appropriate to 

reduce radiation dose to as low as reasonably achievable.



Dictated by: Jason Multani MD @ 08/04/2021 16:40:19



(Electronically Signed)

## 2021-08-05 NOTE — PCM.PN
<John Garcia - Last Filed: 08/05/21 17:55>





- General Info


Date of Service: 08/05/21


Admission Dx/Problem (Free Text): 


                           Admission Diagnosis/Problem





Admission Diagnosis/Problem      Pulmonary edema








Subjective Update: 


89-year-old female admitted for fluid overload pulmonary edema and infected skin

ulcer in her left groin on clindamycin 600 mg every 8 hours.  Patient is 

receiving Lasix 40 mg twice daily.  At night patient is on CPAP at home for ob

structive sleep apnea which she used last night.  Her saturation was noted to be

in the 80s so RT put her on 5 L.  Patient had one bowel movement.  Patient 

continues to have orthopnea but denies chest pain, palpitations, dizziness, 

confusion, leg pain, pedal edema, fever or chills.  Patient denies abdominal 

pain, diarrhea, nausea or vomiting.  Patient had her infected skin covered with 

gauze and states that it continues to be mildly painful.  Patient does appear to

have some weakness and is willing to work with physical therapy.








- Review of Systems


General: Denies: Fever, Chills


HEENT: Denies: Headaches, Visual Changes


Pulmonary: Denies: Shortness of Breath, Cough, Sputum, Wheezing


Cardiovascular: Reports: Orthopnea.  Denies: Chest Pain, Palpitations, PND, 

Edema, Lightheadedness


Gastrointestinal: Denies: Abdominal Pain, Constipation, Decreased Appetite, 

Diarrhea, Hematochezia, Melena


Genitourinary: Reports: Incontinence.  Denies: Dysuria, Frequency, Burning, 

Hematuria


Musculoskeletal: Denies: Leg Pain


Neurological: Denies: Confusion, Dizziness, Headache, Syncope





- Patient Data


Vitals - Most Recent: 


                                Last Vital Signs











Temp  99.0 F   08/05/21 12:00


 


Pulse  85   08/05/21 12:00


 


Resp  20   08/05/21 12:00


 


BP  130/59 L  08/05/21 12:00


 


Pulse Ox  93 L  08/05/21 12:00











Weight - Most Recent: 62.505 kg


I&O - Last 24 Hours: 


                                 Intake & Output











 08/05/21 08/05/21 08/05/21





 06:59 14:59 22:59


 


Intake Total 800  


 


Balance 800  











Lab Results Last 24 Hours: 


                         Laboratory Results - last 24 hr











  08/05/21 08/05/21 Range/Units





  06:10 06:10 


 


WBC  6.12   (4.0-11.0)  K/uL


 


RBC  3.35 L   (4.30-5.90)  M/uL


 


Hgb  9.3 L   (12.0-16.0)  g/dL


 


Hct  27.9 L   (36.0-46.0)  %


 


MCV  83.3   (80.0-98.0)  fL


 


MCH  27.8   (27.0-32.0)  pg


 


MCHC  33.3   (31.0-37.0)  g/dL


 


RDW Std Deviation  52.7   (28.0-62.0)  fl


 


RDW Coeff of Brody  17 H   (11.0-15.0)  %


 


Plt Count  176   (150-400)  K/uL


 


MPV  10.10   (7.40-12.00)  fL


 


Neut % (Auto)  80.8 H   (48.0-80.0)  %


 


Lymph % (Auto)  9.3 L   (16.0-40.0)  %


 


Mono % (Auto)  8.0   (0.0-15.0)  %


 


Eos % (Auto)  1.6   (0.0-7.0)  %


 


Baso % (Auto)  0.3   (0.0-1.5)  %


 


Neut # (Auto)  4.9   (1.4-5.7)  K/uL


 


Lymph # (Auto)  0.6   (0.6-2.4)  K/uL


 


Mono # (Auto)  0.5   (0.0-0.8)  K/uL


 


Eos # (Auto)  0.1   (0.0-0.7)  K/uL


 


Baso # (Auto)  0.0   (0.0-0.1)  K/uL


 


Nucleated RBC %  0.0   /100WBC


 


Nucleated RBCs #  0   K/uL


 


Sodium   140  (136-145)  mmol/L


 


Potassium   3.1 L  (3.5-5.1)  mmol/L


 


Chloride   102  ()  mmol/L


 


Carbon Dioxide   27.9  (21.0-32.0)  mmol/L


 


BUN   19 H  (7.0-18.0)  mg/dL


 


Creatinine   0.9  (0.6-1.0)  mg/dL


 


Est Cr Clr Drug Dosing   39.67  mL/min


 


Estimated GFR (MDRD)   59.0  ml/min


 


Glucose   114 H  ()  mg/dL


 


Calcium   9.6  (8.5-10.1)  mg/dL


 


Phosphorus   3.7  (2.6-4.7)  mg/dL


 


Magnesium   1.8  (1.8-2.4)  mg/dL











Med Orders - Current: 


                               Current Medications





Acetaminophen (Acetaminophen 325 Mg Tab)  650 mg PO Q4H PRN


   PRN Reason: Pain (Mild 1-3)/fever


Albuterol/Ipratropium (Albuterol/Ipratropium 3.0-0.5 Mg/3 Ml Neb Soln)  3 ml NEB

Q4HRRT PRN


   PRN Reason: sob/wheezing


Carbidopa/Levodopa (Carbidopa/Levodopa  Mg Tab)  1.5 tab PO TID [facility]


   Last Admin: 08/05/21 14:56 Dose:  1.5 tab


   Documented by: 


Enoxaparin Sodium (Enoxaparin 30 Mg/0.3 Ml Syringe)  30 mg SUBCUT Q24H [facility]


Furosemide (Furosemide 40 Mg/4 Ml Vial)  40 mg IVPUSH Q12H [facility]


   Last Admin: 08/05/21 04:47 Dose:  40 mg


   Documented by: 


Gabapentin (Gabapentin 100 Mg Cap)  100 mg PO TID [facility]


   Last Admin: 08/05/21 14:56 Dose:  100 mg


   Documented by: 


Clindamycin Phosphate (Cleocin In D5w 600 Mg/50 Ml)  50 mls @ 100 mls/hr IV Q8H 

[facility]


   Last Admin: 08/05/21 15:00 Dose:  92.593 mls/hr


   Documented by: 


Lorazepam (Lorazepam 2 Mg/Ml Sdv)  0.5 mg IVPUSH ONETIME PRN


   PRN Reason: Anxiety


Non-Formulary Medication (Multivitamin With Minerals [Hair, Skin And Nails])  1 

tab PO DAILY [facility]


Non-Formulary Medication (Cyanocobalamin (Vitamin B12))  1,000 mcg PO DAILY [facility]


Non-Formulary Medication (Cholecalciferol (Vitamin D3) [Vitamin D3])  1,000 unit

PO DAILY [facility]


Non-Formulary Medication (Docusate Sodium [Docusate Sodium])  100 mg PO DAILY 

[facility]


Pantoprazole Sodium (Pantoprazole 40 Mg Tab.Cr)  40 mg PO ACBREAKFAST [facility]


   Last Admin: 08/05/21 06:32 Dose:  40 mg


   Documented by: 


Esomeprazole 20 Mg  1 each PO QAM [facility]


Polyethylene Glycol (Polyethylene Glycol 3350 Powder 17 Gm Packet)  17 gm PO 

BEDTIME PRN


   PRN Reason: Constipation


Sertraline HCl (Sertraline 100 Mg Tab)  100 mg PO DAILY [facility]


   Last Admin: 08/05/21 08:31 Dose:  100 mg


   Documented by: 


Sodium Chloride (Sodium Chloride 0.9% 10 Ml Syringe)  10 ml FLUSH ASDIRECTED PRN


   PRN Reason: Keep Vein Open


   Last Admin: 08/04/21 13:19 Dose:  10 ml


   Documented by: 


Sodium Chloride (Sodium Chloride 0.9% 2.5 Ml Syringe)  2.5 ml FLUSH ASDIRECTED 

PRN


   PRN Reason: Keep Vein Open


   Last Admin: 08/04/21 13:19 Dose:  2.5 ml


   Documented by: 





Discontinued Medications





Enoxaparin Sodium (Enoxaparin 40 Mg/0.4 Ml Syringe)  40 mg SUBCUT Q24H [facility]


   Last Admin: 08/04/21 22:36 Dose:  40 mg


   Documented by: 


Furosemide (Furosemide 20 Mg/2 Ml Vial)  20 mg IVPUSH ONETIME ONE


   Stop: 08/04/21 16:54


   Last Admin: 08/04/21 18:27 Dose:  20 mg


   Documented by: 


Clindamycin Phosphate 600 mg/ (Premix)  50 mls @ 100 mls/hr IV ONETIME ONE


   Stop: 08/04/21 12:55


   Last Admin: 08/04/21 13:19 Dose:  100 mls/hr


   Documented by: 


Sodium Chloride (Normal Saline)  500 mls @ 999 mls/hr IV .Bolus ONE


   Stop: 08/04/21 15:17


   Last Admin: 08/04/21 15:13 Dose:  999 mls/hr


   Documented by: 


Azithromycin 500 mg/ Sodium (Chloride)  250 mls @ 250 mls/hr IV ONETIME STA


   Stop: 08/04/21 18:07


   Last Admin: 08/04/21 18:01 Dose:  250 mls/hr


   Documented by: 


Ceftriaxone Sodium/Dextrose 1 (gm/ Premix)  50 mls @ 100 mls/hr IV ONETIME ONE


   Stop: 08/04/21 17:37


   Last Admin: 08/04/21 18:00 Dose:  100 mls/hr


   Documented by: 


Clindamycin Phosphate 400 mg/ (Sodium Chloride)  52.6667 mls @ 100 mls/hr IV Q8H

[facility]


   Last Admin: 08/04/21 23:30 Dose:  Not Given


   Documented by: 


Furosemide 40 mg/ Sodium (Chloride)  54 mls @ 100 mls/hr IV Q12H [facility]


Iopamidol (Iopamidol 755 Mg/Ml 50 Ml Bottle)  100 ml IV ONETIME STA


   Stop: 08/04/21 14:57


   Last Admin: 08/04/21 15:07 Dose:  100 ml


   Documented by: 


Potassium Chloride (Potassium Chloride 20 Meq Tab.Er)  40 meq PO ONETIME ONE


   Stop: 08/05/21 08:02


   Last Admin: 08/05/21 08:30 Dose:  40 meq


   Documented by: 











- Exam


General: Alert, Oriented, Cooperative, No Acute Distress


Neck: Supple.  No: No JVD


Lungs: Crackles


Cardiovascular: Regular Rate, No Murmurs


GI/Abdominal Exam: Normal Bowel Sounds, Soft, Non-Tender, No Distention


Extremities: Normal Inspection, No Pedal Edema.  No: Joint Swelling, Duy's 

Sign, Leg Pain


Peripheral Pulses: 2+: Dorsalis Pedis (L), Dorsalis Pedis (R)


Wound/Incisions: Dressing Dry and Intact, Drainage, Erythema, Other (Left 

inguinal/inner thigh ulcer 5 cm draining purulent discharge.  No surrounding 

erythema.  Pain is)


Neurological: No New Focal Deficit





- Patient Data


Lab Results Last 24 hrs: 


                         Laboratory Results - last 24 hr











  08/05/21 08/05/21 Range/Units





  06:10 06:10 


 


WBC  6.12   (4.0-11.0)  K/uL


 


RBC  3.35 L   (4.30-5.90)  M/uL


 


Hgb  9.3 L   (12.0-16.0)  g/dL


 


Hct  27.9 L   (36.0-46.0)  %


 


MCV  83.3   (80.0-98.0)  fL


 


MCH  27.8   (27.0-32.0)  pg


 


MCHC  33.3   (31.0-37.0)  g/dL


 


RDW Std Deviation  52.7   (28.0-62.0)  fl


 


RDW Coeff of Brody  17 H   (11.0-15.0)  %


 


Plt Count  176   (150-400)  K/uL


 


MPV  10.10   (7.40-12.00)  fL


 


Neut % (Auto)  80.8 H   (48.0-80.0)  %


 


Lymph % (Auto)  9.3 L   (16.0-40.0)  %


 


Mono % (Auto)  8.0   (0.0-15.0)  %


 


Eos % (Auto)  1.6   (0.0-7.0)  %


 


Baso % (Auto)  0.3   (0.0-1.5)  %


 


Neut # (Auto)  4.9   (1.4-5.7)  K/uL


 


Lymph # (Auto)  0.6   (0.6-2.4)  K/uL


 


Mono # (Auto)  0.5   (0.0-0.8)  K/uL


 


Eos # (Auto)  0.1   (0.0-0.7)  K/uL


 


Baso # (Auto)  0.0   (0.0-0.1)  K/uL


 


Nucleated RBC %  0.0   /100WBC


 


Nucleated RBCs #  0   K/uL


 


Sodium   140  (136-145)  mmol/L


 


Potassium   3.1 L  (3.5-5.1)  mmol/L


 


Chloride   102  ()  mmol/L


 


Carbon Dioxide   27.9  (21.0-32.0)  mmol/L


 


BUN   19 H  (7.0-18.0)  mg/dL


 


Creatinine   0.9  (0.6-1.0)  mg/dL


 


Est Cr Clr Drug Dosing   39.67  mL/min


 


Estimated GFR (MDRD)   59.0  ml/min


 


Glucose   114 H  ()  mg/dL


 


Calcium   9.6  (8.5-10.1)  mg/dL


 


Phosphorus   3.7  (2.6-4.7)  mg/dL


 


Magnesium   1.8  (1.8-2.4)  mg/dL











Result Diagrams: 


                                 08/05/21 06:10





                                 08/05/21 06:10





Sepsis Event Note





- Evaluation


Sepsis Screening Result: No Definite Risk





- Focused Exam


Vital Signs: 


                                   Vital Signs











  Temp Pulse Resp BP Pulse Ox


 


 08/05/21 12:00  99.0 F  85  20  130/59 L  93 L


 


 08/05/21 08:00  98.3 F  78  20  114/57 L  92 L


 


 08/05/21 04:43  99 F  85  20  117/57 L  92 L














- Problem List & Annotations


(1) Acute pulmonary edema


SNOMED Code(s): 72004223


   Code(s): J81.0 - ACUTE PULMONARY EDEMA   Status: Acute   Current Visit: Yes  







(2) Infected ulcer of skin


SNOMED Code(s): 5319588


   Code(s): L98.499 - NON-PRESSURE CHRONIC ULCER OF SKIN OF SITES W UNSP 

SEVERITY; L08.9 - LOCAL INFECTION OF THE SKIN AND SUBCUTANEOUS TISSUE, UNSP   

Status: Acute   Current Visit: Yes   





- Problem List Review


Problem List Initiated/Reviewed/Updated: Yes





- My Orders


Last 24 Hours: 


My Active Orders





08/04/21 Dinner


2 Gram Sodium Diet [DIET] 





08/04/21 18:28


Oxygen Therapy [RC] PRN 


Up ad Cammie [RC] ASDIRECTED 


VTE/DVT Education [RC] PER UNIT ROUTINE 


Vital Signs [RC] Q4H 


Acetaminophen [TylenoL]   650 mg PO Q4H PRN 


polyethylene glycoL 3350 [MiraLAX]   17 gm PO BEDTIME PRN 


Resuscitation Status Routine 





08/04/21 19:16


Intake and Output Strict [RC] Q12H 


Telemetry Monitoring [Cardiac Monitoring] [RC] .AS DIRECTED 





08/04/21 19:17


Daily Weight [Height and Weight] [RC] DAILY 





08/05/21 05:00


Furosemide [Lasix]   40 mg IVPUSH Q12H 





08/05/21 Breakfast


Fluid Restriction [DIET] 





08/05/21 07:30


Pantoprazole [ProTONIX***]   40 mg PO ACBREAKFAST 





08/05/21 08:00


Carbidopa/Levodopa [Sinemet  mg]   1.5 tab PO TID 


Gabapentin [Neurontin]   100 mg PO TID 





08/05/21 19:17


Echo Comp wo Cont [US] Routine 





08/05/21 21:00


Enoxaparin [Lovenox]   30 mg SUBCUT Q24H 














- Plan


Plan:: 





I have seen and evaluated the patient and agree with the residents note unless 

specified in my note














I performed a history and physical exam of the patient and discussed management 

with resident. I have reviewed the residents note and agree with documented 

findings and plan unless otherwise specified in my note.








89-year-old female admitted for pulmonary edema and infected skin ulcer.  

Continue Lasix 40 mg twice daily and de-escalate as patient's fluid status 

allows.  We will downgrade her to 20 twice daily when her lungs are clear.  

Echocardiogram is pending.  Continue patient's home CPAP at night.  Patient 

potassium was 3.1 and was repleted with 40 mEq p.o.


Patient is on day 12 clindamycin 600 mg every 8 hours.  Patient WBC count within

 normal limits and no fevers.


Patient denied Ruiz catheter.  Patient will work with physical therapy today.  

We will follow up with the recommendations.  Duo nebs as needed.  We will also 

get a wound care consult.  Patient is urinating.  Patient had one bowel 

movement.  





<Allyson Drummond - Last Filed: 08/06/21 13:03>





- Patient Data


Vitals - Most Recent: 


                                Last Vital Signs











Temp  37.2 C   08/06/21 08:00


 


Pulse  80   08/06/21 08:00


 


Resp  17   08/06/21 08:00


 


BP  106/52 L  08/06/21 08:00


 


Pulse Ox  92 L  08/06/21 08:00











I&O - Last 24 Hours: 


                                 Intake & Output











 08/05/21 08/06/21 08/06/21





 22:59 06:59 14:59


 


Intake Total 770 500 


 


Output Total 850 50 


 


Balance -80 450 











Lab Results Last 24 Hours: 


                         Laboratory Results - last 24 hr











  08/06/21 08/06/21 08/06/21 Range/Units





  06:53 06:53 06:53 


 


WBC  5.64    (4.0-11.0)  K/uL


 


RBC  3.45 L    (4.30-5.90)  M/uL


 


Hgb  9.7 L    (12.0-16.0)  g/dL


 


Hct  29.0 L    (36.0-46.0)  %


 


MCV  84.1    (80.0-98.0)  fL


 


MCH  28.1    (27.0-32.0)  pg


 


MCHC  33.4    (31.0-37.0)  g/dL


 


RDW Std Deviation  53.1    (28.0-62.0)  fl


 


RDW Coeff of Brody  17 H    (11.0-15.0)  %


 


Plt Count  221    (150-400)  K/uL


 


MPV  10.60    (7.40-12.00)  fL


 


Neut % (Auto)  76.2    (48.0-80.0)  %


 


Lymph % (Auto)  14.2 L    (16.0-40.0)  %


 


Mono % (Auto)  8.3    (0.0-15.0)  %


 


Eos % (Auto)  1.1    (0.0-7.0)  %


 


Baso % (Auto)  0.2    (0.0-1.5)  %


 


Neut # (Auto)  4.3    (1.4-5.7)  K/uL


 


Lymph # (Auto)  0.8    (0.6-2.4)  K/uL


 


Mono # (Auto)  0.5    (0.0-0.8)  K/uL


 


Eos # (Auto)  0.1    (0.0-0.7)  K/uL


 


Baso # (Auto)  0.0    (0.0-0.1)  K/uL


 


Nucleated RBC %  0.0    /100WBC


 


Nucleated RBCs #  0    K/uL


 


Sodium   138   (136-145)  mmol/L


 


Potassium   3.2 L   (3.5-5.1)  mmol/L


 


Chloride   100   ()  mmol/L


 


Carbon Dioxide   30.4   (21.0-32.0)  mmol/L


 


BUN   22 H   (7.0-18.0)  mg/dL


 


Creatinine   1.1 H   (0.6-1.0)  mg/dL


 


Est Cr Clr Drug Dosing   32.46   mL/min


 


Estimated GFR (MDRD)   46.8   ml/min


 


Glucose   143 H   ()  mg/dL


 


Calcium   9.9   (8.5-10.1)  mg/dL


 


Magnesium    1.8  (1.8-2.4)  mg/dL











Med Orders - Current: 


                               Current Medications





Acetaminophen (Acetaminophen 325 Mg Tab)  650 mg PO Q4H PRN


   PRN Reason: Pain (Mild 1-3)/fever


Albuterol/Ipratropium (Albuterol/Ipratropium 3.0-0.5 Mg/3 Ml Neb Soln)  3 ml NEB

 Q4HRRT PRN


   PRN Reason: sob/wheezing


   Last Admin: 08/05/21 21:20 Dose:  3 ml


   Documented by: 


Carbidopa/Levodopa (Carbidopa/Levodopa  Mg Tab)  1.5 tab PO TID [facility]


   Last Admin: 08/06/21 06:05 Dose:  1.5 tab


   Documented by: 


Cholecalciferol (Cholecalciferol (Vitamin D3) 25 Mcg Tab)  25 mcg PO DAILY [facility]


   Last Admin: 08/06/21 08:14 Dose:  25 mcg


   Documented by: 


Cyanocobalamin (Cyanocobalamin (Vitamin B12) 500 Mcg Tab)  1,000 mcg PO DAILY 

[facility]


   Last Admin: 08/06/21 08:13 Dose:  1,000 mcg


   Documented by: 


Docusate Sodium (Docusate Sodium 100 Mg Cap)  100 mg PO DAILY [facility]


   Last Admin: 08/06/21 08:12 Dose:  100 mg


   Documented by: 


Enoxaparin Sodium (Enoxaparin 30 Mg/0.3 Ml Syringe)  30 mg SUBCUT Q24H [facility]


   Last Admin: 08/05/21 21:12 Dose:  30 mg


   Documented by: 


Furosemide (Furosemide 20 Mg/2 Ml Vial)  20 mg IVPUSH Q12H [facility]


   Last Admin: 08/06/21 06:01 Dose:  20 mg


   Documented by: 


Gabapentin (Gabapentin 100 Mg Cap)  100 mg PO TID [facility]


   Last Admin: 08/06/21 06:05 Dose:  100 mg


   Documented by: 


Clindamycin Phosphate (Cleocin In D5w 600 Mg/50 Ml)  50 mls @ 100 mls/hr IV Q8H 

[facility]


   Last Admin: 08/06/21 06:33 Dose:  92.593 mls/hr


   Documented by: 


Magnesium Sulfate 2 gm/ Premix  50 mls @ 12.5 mls/hr IV ONETIME ONE


   Stop: 08/06/21 13:33


   Last Admin: 08/06/21 10:18 Dose:  12.5 mls/hr


   Documented by: 


Metronidazole 500 mg/ Premix  100 mls @ 100 mls/hr IV QID [facility]


   Last Admin: 08/06/21 11:38 Dose:  100 mls/hr


   Documented by: 


Lorazepam (Lorazepam 2 Mg/Ml Sdv)  0.5 mg IVPUSH ONETIME PRN


   PRN Reason: Anxiety


   Last Admin: 08/05/21 23:56 Dose:  0.5 mg


   Documented by: 


Multivitamins/Minerals/Vitamin C (Multivitamin Tab)  1 tab PO DAILY [facility]


   Last Admin: 08/06/21 08:13 Dose:  1 tab


   Documented by: 


Pantoprazole Sodium (Pantoprazole 40 Mg Tab.Cr)  40 mg PO ACBREAKFAST [facility]


   Last Admin: 08/06/21 06:33 Dose:  40 mg


   Documented by: 


Polyethylene Glycol (Polyethylene Glycol 3350 Powder 17 Gm Packet)  17 gm PO 

BEDTIME PRN


   PRN Reason: Constipation


Sertraline HCl (Sertraline 100 Mg Tab)  100 mg PO DAILY [facility]


   Last Admin: 08/06/21 08:13 Dose:  100 mg


   Documented by: 


Sodium Chloride (Sodium Chloride 0.9% 10 Ml Syringe)  10 ml FLUSH ASDIRECTED PRN


   PRN Reason: Keep Vein Open


   Last Admin: 08/04/21 13:19 Dose:  10 ml


   Documented by: 


Sodium Chloride (Sodium Chloride 0.9% 2.5 Ml Syringe)  2.5 ml FLUSH ASDIRECTED 

PRN


   PRN Reason: Keep Vein Open


   Last Admin: 08/04/21 13:19 Dose:  2.5 ml


   Documented by: 





Discontinued Medications





Enoxaparin Sodium (Enoxaparin 40 Mg/0.4 Ml Syringe)  40 mg SUBCUT Q24H [facility]


   Last Admin: 08/04/21 22:36 Dose:  40 mg


   Documented by: 


Furosemide (Furosemide 20 Mg/2 Ml Vial)  20 mg IVPUSH ONETIME ONE


   Stop: 08/04/21 16:54


   Last Admin: 08/04/21 18:27 Dose:  20 mg


   Documented by: 


Furosemide (Furosemide 40 Mg/4 Ml Vial)  40 mg IVPUSH Q12H [facility]


   Last Admin: 08/05/21 17:23 Dose:  40 mg


   Documented by: 


Clindamycin Phosphate 600 mg/ (Premix)  50 mls @ 100 mls/hr IV ONETIME ONE


   Stop: 08/04/21 12:55


   Last Admin: 08/04/21 13:19 Dose:  100 mls/hr


   Documented by: 


Sodium Chloride (Normal Saline)  500 mls @ 999 mls/hr IV .Bolus ONE


   Stop: 08/04/21 15:17


   Last Admin: 08/04/21 15:13 Dose:  999 mls/hr


   Documented by: 


Azithromycin 500 mg/ Sodium (Chloride)  250 mls @ 250 mls/hr IV ONETIME STA


   Stop: 08/04/21 18:07


   Last Admin: 08/04/21 18:01 Dose:  250 mls/hr


   Documented by: 


Ceftriaxone Sodium/Dextrose 1 (gm/ Premix)  50 mls @ 100 mls/hr IV ONETIME ONE


   Stop: 08/04/21 17:37


   Last Admin: 08/04/21 18:00 Dose:  100 mls/hr


   Documented by: 


Clindamycin Phosphate 400 mg/ (Sodium Chloride)  52.6667 mls @ 100 mls/hr IV Q8H

 [facility]


   Last Admin: 08/04/21 23:30 Dose:  Not Given


   Documented by: 


Furosemide 40 mg/ Sodium (Chloride)  54 mls @ 100 mls/hr IV Q12H [facility]


Iopamidol (Iopamidol 755 Mg/Ml 50 Ml Bottle)  100 ml IV ONETIME STA


   Stop: 08/04/21 14:57


   Last Admin: 08/04/21 15:07 Dose:  100 ml


   Documented by: 


Esomeprazole 20 Mg  1 each PO QAM [facility]


Potassium Chloride (Potassium Chloride 20 Meq Tab.Er)  40 meq PO ONETIME ONE


   Stop: 08/05/21 08:02


   Last Admin: 08/05/21 08:30 Dose:  40 meq


   Documented by: 


Potassium Chloride (Potassium Chloride 20 Meq Tab.Er)  40 meq PO ONETIME ONE


   Stop: 08/06/21 07:48


   Last Admin: 08/06/21 08:12 Dose:  40 meq


   Documented by: 











- Patient Data


Lab Results Last 24 hrs: 


                         Laboratory Results - last 24 hr











  08/06/21 08/06/21 08/06/21 Range/Units





  06:53 06:53 06:53 


 


WBC  5.64    (4.0-11.0)  K/uL


 


RBC  3.45 L    (4.30-5.90)  M/uL


 


Hgb  9.7 L    (12.0-16.0)  g/dL


 


Hct  29.0 L    (36.0-46.0)  %


 


MCV  84.1    (80.0-98.0)  fL


 


MCH  28.1    (27.0-32.0)  pg


 


MCHC  33.4    (31.0-37.0)  g/dL


 


RDW Std Deviation  53.1    (28.0-62.0)  fl


 


RDW Coeff of Brody  17 H    (11.0-15.0)  %


 


Plt Count  221    (150-400)  K/uL


 


MPV  10.60    (7.40-12.00)  fL


 


Neut % (Auto)  76.2    (48.0-80.0)  %


 


Lymph % (Auto)  14.2 L    (16.0-40.0)  %


 


Mono % (Auto)  8.3    (0.0-15.0)  %


 


Eos % (Auto)  1.1    (0.0-7.0)  %


 


Baso % (Auto)  0.2    (0.0-1.5)  %


 


Neut # (Auto)  4.3    (1.4-5.7)  K/uL


 


Lymph # (Auto)  0.8    (0.6-2.4)  K/uL


 


Mono # (Auto)  0.5    (0.0-0.8)  K/uL


 


Eos # (Auto)  0.1    (0.0-0.7)  K/uL


 


Baso # (Auto)  0.0    (0.0-0.1)  K/uL


 


Nucleated RBC %  0.0    /100WBC


 


Nucleated RBCs #  0    K/uL


 


Sodium   138   (136-145)  mmol/L


 


Potassium   3.2 L   (3.5-5.1)  mmol/L


 


Chloride   100   ()  mmol/L


 


Carbon Dioxide   30.4   (21.0-32.0)  mmol/L


 


BUN   22 H   (7.0-18.0)  mg/dL


 


Creatinine   1.1 H   (0.6-1.0)  mg/dL


 


Est Cr Clr Drug Dosing   32.46   mL/min


 


Estimated GFR (MDRD)   46.8   ml/min


 


Glucose   143 H   ()  mg/dL


 


Calcium   9.9   (8.5-10.1)  mg/dL


 


Magnesium    1.8  (1.8-2.4)  mg/dL











Result Diagrams: 


                                 08/06/21 06:53





                                 08/06/21 06:53





Sepsis Event Note





- Focused Exam


Vital Signs: 


                                   Vital Signs











  Temp Pulse Resp BP BP Pulse Ox


 


 08/06/21 08:00  37.2 C  80  17  106/52 L   92 L


 


 08/06/21 04:00  36.4 C  79  18   113/53 L  94 L














- My Orders


Last 24 Hours: 


My Active Orders





08/06/21 09:00


Cholecalciferol (Vitamin D3) [Vitamin D3]   25 mcg PO DAILY 


Cyanocobalamin (Vitamin B12) [Vitamin B12]   1,000 mcg PO DAILY 


Docusate Sodium [Colace]   100 mg PO DAILY 


Multivitamins [Tab-A-Stephanie]   1 tab PO DAILY 














- Plan


Plan:: 





I have seen and evaluated the patient and agree with the residents note unless 

specified in my note

## 2021-08-06 NOTE — CR
INDICATION:



Cough 



COMPARISON:



08/04/2021 



FINDINGS:



An erect single view of the chest was obtained at 0440 hours. 



There continues to be mild vascular engorgement with mild diffuse 

interstitial pulmonary edema, consistent with mild congestive failure. I 

cannot entirely exclude an atypical pneumonia, but the uniformity of 

involvement suggests congestive failure.



No sign of any pleural effusions. 



The heart remains mildly enlarged. 



The mediastinum is otherwise normal in appearance. 



The osseous structures are normal in appearance for the patient`s age.



IMPRESSION:



Findings consistent with stable mild congestive failure. 



Stable mild cardiomegaly.



Dictated by Jaciel Fairchild MD @ 8/6/2021 5:21:06 AM



Signed by Dr. Jaciel Fairchild @ Aug  6 2021  5:21AM

## 2021-08-06 NOTE — PCM.CONS
H&P History of Present Illness





- General


Date of Service: 08/06/21


Admit Problem/Dx: 


                           Admission Diagnosis/Problem





Admission Diagnosis/Problem      Pulmonary edema








Source of Information: Patient


History Limitations: Reports: No Limitations





- History of Present Illness


Initial Comments - Free Text/Narative: 


Patient is an 89 year old female admitted for pulmonary edema and a non-healing 

infected lesion on the left groin. She states that she noticed a wound along the

left groin crease and it has been growing in size. She states that it doesn't 

hurt but she has neuropathy. She denies fevers or chills. A CT abdomen pelvis 

was performed in the ER that showed thickening of the area with no evidence of 

fluid or gas. There was lymphadenopathy of the left inguinal nodes. The area was

covered in a non-adherent dressing. She was placed on clindamycin. This morning 

the erythema has increased in size. An US was performed that showed no fluid 

collection. Flagyl was added but the wound remained cellulitic. 


  ** Left Shoulder


Pain Score (Numeric/FACES): 2





- Related Data


Allergies/Adverse Reactions: 


                                    Allergies











Allergy/AdvReac Type Severity Reaction Status Date / Time


 


meperidine HCl [From Demerol] Allergy  Vomiting Verified 08/04/21 19:54


 


Penicillins Allergy  Rash Verified 08/04/21 19:54











Home Medications: 


                                    Home Meds





Sertraline HCl [Zoloft] 100 mg PO BID 08/03/18 [History]


Cholecalciferol (Vitamin D3) [Vitamin D3] 1,000 unit PO DAILY 07/17/19 [History]


Cyanocobalamin (Vitamin B12) [Vitamin B12] 1,000 mcg PO DAILY 07/17/19 [History]


Esomeprazole Magnesium [Nexium 24Hr] 20 mg PO QAM 07/17/19 [History]


Multivitamin with Minerals [Hair, Skin and Nails] 1 tab PO DAILY 07/17/19 

[History]


Carbidopa/Levodopa [Carbidopa-Levodopa ] 37.5 - 150 mg PO TID 08/05/21 

[History]


Docusate Sodium 100 mg PO DAILY 08/05/21 [History]


Finasteride 1 mg PO DAILY 08/05/21 [History]


Gabapentin [Neurontin] 100 mg PO TID 08/05/21 [History]


Ibuprofen 200 mg PO Q6H PRN 08/05/21 [History]


Loratadine [Claritin] 10 mg PO DAILY PRN 08/05/21 [History]


Simvastatin 20 mg PO BEDTIME 08/05/21 [History]











Past Medical History


HEENT History: Reports: Hard of Hearing, Other (See Below)


Other HEENT History: wears glasses,  has bilateral hearing aides


Cardiovascular History: Reports: High Cholesterol, Hypertension


Respiratory History: Reports: Sleep Apnea, SOB, Other (See Below)


Other Respiratory History: uses CPAP


Gastrointestinal History: Reports: GERD, Hiatal Hernia, Other (See Below)


Genitourinary History: Reports: Renal Calculus, Urinary Incontinence


OB/GYN History: Reports: Pregnancy


Musculoskeletal History: Reports: Back Pain, Chronic, Fracture, Osteoarthritis


Other Musculoskeletal History: hx of fx wrist


Neurological History: Reports: Neuropathy, Peripheral, Other (See Below)


Psychiatric History: Reports: Anxiety, Depression, Other (See Below)


Endocrine/Metabolic History: Reports: None


Hematologic History: Reports: None


Immunologic History: Reports: None


Oncologic (Cancer) History: Reports: None


Dermatologic History: Reports: Other (See Below)


Other Dermatologic History: dermititis on scalp





- Infectious Disease History


Infectious Disease History: Reports: Chicken Pox, Measles, Mumps





- Past Surgical History


Head Surgeries/Procedures: Reports: None


HEENT Surgical History: Reports: Cataract Surgery, Naso-Sinus Surgery, 

Tonsillectomy


Cardiovascular Surgical History: Reports: None


Respiratory Surgical History: Reports: None


GI Surgical History: Reports: Colonoscopy, Hernia, Inguinal, Other (See Below)


Other GI Surgeries/Procedures: I&D of Perirectal abscess


Female  Surgical History: Reports: Hysterectomy, Salpingo-Oophorectomy


Endocrine Surgical History: Reports: None


Neurological Surgical History: Reports: Lumbar Spine, Spinal Fusion


Other Neurological Surgeries/Procedures: L3-4-5,  has hardware


Musculoskeletal Surgical History: Reports: Arthroscopic Knee


Oncologic Surgical History: Reports: None


Dermatological Surgical History: Reports: None





Social & Family History





- Family History


Family Medical History: No Pertinent Family History





- Tobacco Use


Tobacco Use Status *Q: Former Tobacco User


Used Tobacco, but Quit: Yes


Month/Year Tobacco Last Used: 50


Second Hand Smoke Exposure: No





- Caffeine Use


Caffeine Use: Reports: Coffee, Tea





- Recreational Drug Use


Recreational Drug Use: No





H&P Review of Systems





- Review of Systems:


Review Of Systems: Comprehensive ROS is negative, except as noted in HPI.





Exam





- Exam


Exam: See Below





- Vital Signs


Vital Signs: 


                                Last Vital Signs











Temp  37.4 C   08/06/21 15:33


 


Pulse  83   08/06/21 15:33


 


Resp  18   08/06/21 15:33


 


BP  132/49 L  08/06/21 15:33


 


Pulse Ox  94 L  08/06/21 15:33











Weight: 63.503 kg





- Exam


General: Alert, Oriented


HEENT: Conjunctiva Clear, Mucosa Moist & Pink, Posterior Pharynx Clear


Lungs: Normal Respiratory Effort


Cardiovascular: Regular Rate


Extremities: Other (4 cm x 1.5 cm wound with dark necrotic tissue mixed with red

weeping tissue. No purulent material was able to be expressed. there was no 

fluctuance or crepitus. Cellulitis extending from wound circumfrentially by 1-2 

cm. Has not extended beyond marked areas. )





- Patient Data


Lab Results Last 24 hrs: 


                         Laboratory Results - last 24 hr











  08/06/21 08/06/21 08/06/21 Range/Units





  06:53 06:53 06:53 


 


WBC  5.64    (4.0-11.0)  K/uL


 


RBC  3.45 L    (4.30-5.90)  M/uL


 


Hgb  9.7 L    (12.0-16.0)  g/dL


 


Hct  29.0 L    (36.0-46.0)  %


 


MCV  84.1    (80.0-98.0)  fL


 


MCH  28.1    (27.0-32.0)  pg


 


MCHC  33.4    (31.0-37.0)  g/dL


 


RDW Std Deviation  53.1    (28.0-62.0)  fl


 


RDW Coeff of Brody  17 H    (11.0-15.0)  %


 


Plt Count  221    (150-400)  K/uL


 


MPV  10.60    (7.40-12.00)  fL


 


Neut % (Auto)  76.2    (48.0-80.0)  %


 


Lymph % (Auto)  14.2 L    (16.0-40.0)  %


 


Mono % (Auto)  8.3    (0.0-15.0)  %


 


Eos % (Auto)  1.1    (0.0-7.0)  %


 


Baso % (Auto)  0.2    (0.0-1.5)  %


 


Neut # (Auto)  4.3    (1.4-5.7)  K/uL


 


Lymph # (Auto)  0.8    (0.6-2.4)  K/uL


 


Mono # (Auto)  0.5    (0.0-0.8)  K/uL


 


Eos # (Auto)  0.1    (0.0-0.7)  K/uL


 


Baso # (Auto)  0.0    (0.0-0.1)  K/uL


 


Nucleated RBC %  0.0    /100WBC


 


Nucleated RBCs #  0    K/uL


 


Sodium   138   (136-145)  mmol/L


 


Potassium   3.2 L   (3.5-5.1)  mmol/L


 


Chloride   100   ()  mmol/L


 


Carbon Dioxide   30.4   (21.0-32.0)  mmol/L


 


BUN   22 H   (7.0-18.0)  mg/dL


 


Creatinine   1.1 H   (0.6-1.0)  mg/dL


 


Est Cr Clr Drug Dosing   32.46   mL/min


 


Estimated GFR (MDRD)   46.8   ml/min


 


Glucose   143 H   ()  mg/dL


 


Calcium   9.9   (8.5-10.1)  mg/dL


 


Magnesium    1.8  (1.8-2.4)  mg/dL











Result Diagrams: 


                                 08/06/21 06:53





                                 08/06/21 06:53


Filiberto Results Last 24 hrs: 


                                  Microbiology











 08/04/21 17:40 Gram Stain - Final





 Wound - Groin, Left 














Sepsis Event Note





- Evaluation


Sepsis Screening Result: No Definite Risk





- Focused Exam


Vital Signs: 


                                   Vital Signs











  Temp Pulse Resp BP BP Pulse Ox


 


 08/06/21 15:33  37.4 C  83  18   132/49 L  94 L


 


 08/06/21 12:00  36.5 C  73  15   113/40 L  96


 


 08/06/21 08:00  37.2 C  80  17  106/52 L   92 L














Consult PN Assessment/Plan


Procedures: 


Procedures





APPLY FOREARM SPLINT (08/03/18)


ASSAY OF FERRITIN (08/18/20)


ASSAY OF NATRIURETIC PEPTIDE (02/17/15)


ASSAY OF PROTEIN SERUM (08/18/20)


ASSAY OF TROPONIN QUANT (01/26/18)


ASSAY THYROID STIM HORMONE (08/18/20)


CHEST X-RAY 2VW FRONTAL&LATL (02/17/15)


COMPLETE CBC AUTOMATED (02/09/16)


COMPLETE CBC W/AUTO DIFF WBC (08/18/20)


COMPREHEN METABOLIC PANEL (05/22/20)


CT ABD & PELV W/CONTRAST (07/12/19)


CT HEAD/BRAIN W/O DYE (01/26/18)


CT NECK SPINE W/O DYE (01/26/18)


CT THORAX DX C- (02/20/19)


CYSTOURETERO W/LITHOTRIPSY (07/18/19)


DESTRUCT B9 LESION 1-14 (08/30/16)


DESTRUCT PREMALG LES 2-14 (12/12/16)


DESTRUCT PREMALG LESION (12/12/16)


DXA BONE DENSITY AXIAL (12/12/16)


ECHO EXAM OF ABDOMEN (05/28/20)


EGD BIOPSY SINGLE/MULTIPLE (04/23/15)


ELECTROCARDIOGRAM TRACING (01/26/18)


EMERGENCY DEPT VISIT (08/16/19)


EMERGENCY DEPT VISIT (01/26/18)


EXTREMITY STUDY (05/07/15)


FLUOROGUIDE FOR SPINE INJECT (10/02/14)


FLUOROSCOPY <1 HR PHYS/QHP (07/18/19)


IMMUNIZATION ADMIN (08/16/19)


INJECT SACROILIAC JOINT (11/30/17)


INJECT SPINE LUMBAR/SACRAL (08/13/15)


IRON BINDING TEST (08/18/20)


LIPID PANEL (02/07/19)


METABOLIC PANEL TOTAL CA (07/11/19)


MICROBE SUSCEPTIBLE FILIBERTO (05/13/15)


MRI BRAIN STEM W/O & W/DYE (11/09/16)


MRI LUMBAR SPINE W/O DYE (09/23/16)


OFFICE O/P EST LOW 20-29 MIN (02/07/19)


OFFICE O/P EST MOD 30-39 MIN (04/18/17)


OFFICE O/P NEW LOW 30-44 MIN (08/30/16)


OFFICE O/P NEW MOD 45-59 MIN (03/29/16)


ORGANIC ACID SINGLE QUANT (10/25/16)


PCV13 VACCINE IM (02/09/16)


PROTHROMBIN TIME (02/09/16)


ROUTINE VENIPUNCTURE (08/18/20)


SPECIAL STAINS GROUP 1 (04/23/15)


TD VACC NO PRESV 7 YRS+ IM (08/16/19)


THER/PROPH/DIAG INJ IV PUSH (01/26/18)


TISSUE EXAM BY PATHOLOGIST (04/23/15)


URINALYSIS AUTO W/O SCOPE (05/22/20)


URINALYSIS AUTO W/SCOPE (12/03/20)


URINE BACTERIA CULTURE (05/13/15)


URINE CULTURE/COLONY COUNT (07/10/19)


VITAMIN B-12 (05/22/18)


VITAMIN D 25 HYDROXY (08/18/20)


X-RAY EXAM ABDOMEN 1 VIEW (12/03/20)


X-RAY EXAM CHEST 1 VIEW (01/26/18)


X-RAY EXAM HIP UNI 2-3 VIEWS (05/24/19)


X-RAY EXAM L-2 SPINE 4/>VWS (12/29/14)


X-RAY EXAM OF HIP (04/17/14)


X-RAY EXAM OF PELVIS (04/17/14)


X-RAY EXAM OF SHOULDER (01/29/18)


X-RAY EXAM OF WRIST (08/16/18)


X-RAY EXAM OF WRIST (08/03/18)


X-RAY EXAM UNILAT RIBS/CHEST (10/21/16)








(1) Cellulitis


SNOMED Code(s): 624441154


   Code(s): L03.90 - CELLULITIS, UNSPECIFIED   Current Visit: Yes   


Qualifiers: 


   Site of cellulitis: extremity   Site of cellulitis of extremity: lower 

extremity   Laterality: left   Qualified Code(s): L03.116 - Cellulitis of left 

lower limb   





(2) Infected ulcer of skin


SNOMED Code(s): 2199212


   Code(s): L98.499 - NON-PRESSURE CHRONIC ULCER OF SKIN OF SITES W UNSP 

SEVERITY; L08.9 - LOCAL INFECTION OF THE SKIN AND SUBCUTANEOUS TISSUE, UNSP   

Current Visit: Yes   


Problem List Initiated/Reviewed/Updated: Yes


Plan: 


This appears to be a chronic groin wound that has become infected. Wound culture

has not shown any growth. I would broaden antibiotics to include vancomycin, 

zosyn and continue clindamycin. Discussed PCN allergy with patient and it was a 

mild rash in the past. Patient amenable to trying zosyn. Monitor renal function 

closely. I placed the wound in a wet to dry dressing so that the area can be 

closely monitored. Change BID. Would have nurse check the wound q 4hr to make 

sure the redness is not rapidly spreading. If the redness spreads beyond the 

marked edges despite broad spectrum coverage, will consider surgical debridement

and cultures in am.

## 2021-08-06 NOTE — US
Indication:



Left inner thigh open wound ultrasound



Technique:



Left lower extremity nonvascular with color Doppler analysis



Comparison:



None



Findings/Impression:



No fluid collection to suggest abscess. There is indurated soft tissue 

below the skin surface in an area measuring 4 x 2 x 1 cm. No other 

abnormality.



Dictated by uLis F Doyle MD @ 8/6/2021 12:53:43 PM



Signed by Dr. Luis F Doyle @ Aug  6 2021 12:53PM

## 2021-08-06 NOTE — PCM.PN
- General Info


Date of Service: 08/06/21


Admission Dx/Problem (Free Text): 


                           Admission Diagnosis/Problem





Admission Diagnosis/Problem      Pulmonary edema








Subjective Update: 


89-year-old female admitted for fluid overload pulmonary edema and infected skin

ulcer in her left groin on clindamycin 600 mg every 8 hours.  Patient breathing 

improving and Lasix was downgraded to 20 mg twice daily.  Patient refused to 

work with physical therapy yesterday.  We have encouraged her to do so today.  

Echocardiogram showed ejection fraction of 60 to 65%.


This morning the patient's ulcerated skin lesion looks worse with continued 

drainage and extension of erythema.  Patient denies increased pain.  She denies 

chest pain, palpitations, dizziness, confusion, leg pain, pedal edema, fever or 

chills.  Patient denies abdominal pain, diarrhea, nausea or vomiting. 








- Review of Systems


General: Reports: Weakness.  Denies: Fever, Chills


HEENT: Reports: No Symptoms


Pulmonary: Reports: Shortness of Breath.  Denies: Pleuritic Chest Pain, Cough, 

Hemoptysis, Wheezing


Cardiovascular: Reports: Orthopnea.  Denies: Chest Pain, Palpitations, Edema


Gastrointestinal: Denies: Abdominal Pain, Constipation, Decreased Appetite, 

Diarrhea


Genitourinary: Denies: Dysuria


Musculoskeletal: Denies: Leg Pain





- Patient Data


Vitals - Most Recent: 


                                Last Vital Signs











Temp  97.7 F   08/06/21 12:00


 


Pulse  73   08/06/21 12:00


 


Resp  15   08/06/21 12:00


 


BP  113/40 L  08/06/21 12:00


 


Pulse Ox  96   08/06/21 12:00











Weight - Most Recent: 140 lb


I&O - Last 24 Hours: 


                                 Intake & Output











 08/05/21 08/06/21 08/06/21





 22:59 06:59 14:59


 


Intake Total 770 500 


 


Output Total 850 50 


 


Balance -80 450 











Lab Results Last 24 Hours: 


                         Laboratory Results - last 24 hr











  08/06/21 08/06/21 08/06/21 Range/Units





  06:53 06:53 06:53 


 


WBC  5.64    (4.0-11.0)  K/uL


 


RBC  3.45 L    (4.30-5.90)  M/uL


 


Hgb  9.7 L    (12.0-16.0)  g/dL


 


Hct  29.0 L    (36.0-46.0)  %


 


MCV  84.1    (80.0-98.0)  fL


 


MCH  28.1    (27.0-32.0)  pg


 


MCHC  33.4    (31.0-37.0)  g/dL


 


RDW Std Deviation  53.1    (28.0-62.0)  fl


 


RDW Coeff of Brody  17 H    (11.0-15.0)  %


 


Plt Count  221    (150-400)  K/uL


 


MPV  10.60    (7.40-12.00)  fL


 


Neut % (Auto)  76.2    (48.0-80.0)  %


 


Lymph % (Auto)  14.2 L    (16.0-40.0)  %


 


Mono % (Auto)  8.3    (0.0-15.0)  %


 


Eos % (Auto)  1.1    (0.0-7.0)  %


 


Baso % (Auto)  0.2    (0.0-1.5)  %


 


Neut # (Auto)  4.3    (1.4-5.7)  K/uL


 


Lymph # (Auto)  0.8    (0.6-2.4)  K/uL


 


Mono # (Auto)  0.5    (0.0-0.8)  K/uL


 


Eos # (Auto)  0.1    (0.0-0.7)  K/uL


 


Baso # (Auto)  0.0    (0.0-0.1)  K/uL


 


Nucleated RBC %  0.0    /100WBC


 


Nucleated RBCs #  0    K/uL


 


Sodium   138   (136-145)  mmol/L


 


Potassium   3.2 L   (3.5-5.1)  mmol/L


 


Chloride   100   ()  mmol/L


 


Carbon Dioxide   30.4   (21.0-32.0)  mmol/L


 


BUN   22 H   (7.0-18.0)  mg/dL


 


Creatinine   1.1 H   (0.6-1.0)  mg/dL


 


Est Cr Clr Drug Dosing   32.46   mL/min


 


Estimated GFR (MDRD)   46.8   ml/min


 


Glucose   143 H   ()  mg/dL


 


Calcium   9.9   (8.5-10.1)  mg/dL


 


Magnesium    1.8  (1.8-2.4)  mg/dL











Med Orders - Current: 


                               Current Medications





Acetaminophen (Acetaminophen 325 Mg Tab)  650 mg PO Q4H PRN


   PRN Reason: Pain (Mild 1-3)/fever


Albuterol/Ipratropium (Albuterol/Ipratropium 3.0-0.5 Mg/3 Ml Neb Soln)  3 ml NEB

Q4HRRT PRN


   PRN Reason: sob/wheezing


   Last Admin: 08/05/21 21:20 Dose:  3 ml


   Documented by: 


Carbidopa/Levodopa (Carbidopa/Levodopa  Mg Tab)  1.5 tab PO TID [facility]


   Last Admin: 08/06/21 13:52 Dose:  1.5 tab


   Documented by: 


Cholecalciferol (Cholecalciferol (Vitamin D3) 25 Mcg Tab)  25 mcg PO DAILY [facility]


   Last Admin: 08/06/21 08:14 Dose:  25 mcg


   Documented by: 


Cyanocobalamin (Cyanocobalamin (Vitamin B12) 500 Mcg Tab)  1,000 mcg PO DAILY 

[facility]


   Last Admin: 08/06/21 08:13 Dose:  1,000 mcg


   Documented by: 


Docusate Sodium (Docusate Sodium 100 Mg Cap)  100 mg PO DAILY [facility]


   Last Admin: 08/06/21 08:12 Dose:  100 mg


   Documented by: 


Enoxaparin Sodium (Enoxaparin 30 Mg/0.3 Ml Syringe)  30 mg SUBCUT Q24H [facility]


   Last Admin: 08/05/21 21:12 Dose:  30 mg


   Documented by: 


Furosemide (Furosemide 20 Mg/2 Ml Vial)  20 mg IVPUSH Q12H [facility]


   Last Admin: 08/06/21 06:01 Dose:  20 mg


   Documented by: 


Gabapentin (Gabapentin 100 Mg Cap)  100 mg PO TID [facility]


   Last Admin: 08/06/21 13:52 Dose:  100 mg


   Documented by: 


Clindamycin Phosphate (Cleocin In D5w 600 Mg/50 Ml)  50 mls @ 100 mls/hr IV Q8H 

[facility]


   Last Admin: 08/06/21 06:33 Dose:  92.593 mls/hr


   Documented by: 


Metronidazole 500 mg/ Premix  100 mls @ 100 mls/hr IV QID [facility]


   Last Admin: 08/06/21 11:38 Dose:  100 mls/hr


   Documented by: 


Lorazepam (Lorazepam 2 Mg/Ml Sdv)  0.5 mg IVPUSH ONETIME PRN


   PRN Reason: Anxiety


   Last Admin: 08/05/21 23:56 Dose:  0.5 mg


   Documented by: 


Multivitamins/Minerals/Vitamin C (Multivitamin Tab)  1 tab PO DAILY [facility]


   Last Admin: 08/06/21 08:13 Dose:  1 tab


   Documented by: 


Pantoprazole Sodium (Pantoprazole 40 Mg Tab.Cr)  40 mg PO ACBREAKFAST [facility]


   Last Admin: 08/06/21 06:33 Dose:  40 mg


   Documented by: 


Polyethylene Glycol (Polyethylene Glycol 3350 Powder 17 Gm Packet)  17 gm PO 

BEDTIME PRN


   PRN Reason: Constipation


Sertraline HCl (Sertraline 100 Mg Tab)  100 mg PO DAILY [facility]


   Last Admin: 08/06/21 08:13 Dose:  100 mg


   Documented by: 


Sodium Chloride (Sodium Chloride 0.9% 10 Ml Syringe)  10 ml FLUSH ASDIRECTED PRN


   PRN Reason: Keep Vein Open


   Last Admin: 08/04/21 13:19 Dose:  10 ml


   Documented by: 


Sodium Chloride (Sodium Chloride 0.9% 2.5 Ml Syringe)  2.5 ml FLUSH ASDIRECTED 

PRN


   PRN Reason: Keep Vein Open


   Last Admin: 08/04/21 13:19 Dose:  2.5 ml


   Documented by: 





Discontinued Medications





Enoxaparin Sodium (Enoxaparin 40 Mg/0.4 Ml Syringe)  40 mg SUBCUT Q24H [facility]


   Last Admin: 08/04/21 22:36 Dose:  40 mg


   Documented by: 


Furosemide (Furosemide 20 Mg/2 Ml Vial)  20 mg IVPUSH ONETIME ONE


   Stop: 08/04/21 16:54


   Last Admin: 08/04/21 18:27 Dose:  20 mg


   Documented by: 


Furosemide (Furosemide 40 Mg/4 Ml Vial)  40 mg IVPUSH Q12H [facility]


   Last Admin: 08/05/21 17:23 Dose:  40 mg


   Documented by: 


Clindamycin Phosphate 600 mg/ (Premix)  50 mls @ 100 mls/hr IV ONETIME ONE


   Stop: 08/04/21 12:55


   Last Admin: 08/04/21 13:19 Dose:  100 mls/hr


   Documented by: 


Sodium Chloride (Normal Saline)  500 mls @ 999 mls/hr IV .Bolus ONE


   Stop: 08/04/21 15:17


   Last Admin: 08/04/21 15:13 Dose:  999 mls/hr


   Documented by: 


Azithromycin 500 mg/ Sodium (Chloride)  250 mls @ 250 mls/hr IV ONETIME STA


   Stop: 08/04/21 18:07


   Last Admin: 08/04/21 18:01 Dose:  250 mls/hr


   Documented by: 


Ceftriaxone Sodium/Dextrose 1 (gm/ Premix)  50 mls @ 100 mls/hr IV ONETIME ONE


   Stop: 08/04/21 17:37


   Last Admin: 08/04/21 18:00 Dose:  100 mls/hr


   Documented by: 


Clindamycin Phosphate 400 mg/ (Sodium Chloride)  52.6667 mls @ 100 mls/hr IV Q8H

[facility]


   Last Admin: 08/04/21 23:30 Dose:  Not Given


   Documented by: 


Furosemide 40 mg/ Sodium (Chloride)  54 mls @ 100 mls/hr IV Q12H [facility]


Magnesium Sulfate 2 gm/ Premix  50 mls @ 12.5 mls/hr IV ONETIME ONE


   Stop: 08/06/21 13:33


   Last Admin: 08/06/21 10:18 Dose:  12.5 mls/hr


   Documented by: 


Iopamidol (Iopamidol 755 Mg/Ml 50 Ml Bottle)  100 ml IV ONETIME STA


   Stop: 08/04/21 14:57


   Last Admin: 08/04/21 15:07 Dose:  100 ml


   Documented by: 


[facility]someprazole 20 Mg  [facility]


Potassium Chloride (Potassium Chloride 20 Meq Tab.Er)  40 meq PO ONETIME ONE


   Stop: 08/05/21 08:02


   Last Admin: 08/05/21 08:30 Dose:  40 meq


   Documented by: 


Potassium Chloride (Potassium Chloride 20 Meq Tab.Er)  40 meq PO ONETIME ONE


   Stop: 08/06/21 07:48


   Last Admin: 08/06/21 08:12 Dose:  40 meq


   Documented by: 











- Exam


General: Alert, Oriented, Cooperative, No Acute Distress


HEENT: Pupils Equal


Neck: Supple, No JVD


Lungs: Crackles


Cardiovascular: Regular Rate, Regular Rhythm, No Murmurs


GI/Abdominal Exam: Normal Bowel Sounds, Soft, Non-Tender


Back Exam: Normal Inspection.  No: CVA Tenderness (L), CVA Tenderness (R)


Extremities: No: Pedal Edema, Duy's Sign, Leg Pain


Peripheral Pulses: 2+: Dorsalis Pedis (L), Dorsalis Pedis (R)


Wound/Incisions: Other (Left groin/inner thigh ulcer has progressed in size.  

Ulcer is draining purulent and surrounding erythema has extended and is 2.5 cm. 

Areas of necrosis surrounding erythema.)


Neurological: No New Focal Deficit





- Patient Data


Lab Results Last 24 hrs: 


                         Laboratory Results - last 24 hr











  08/06/21 08/06/21 08/06/21 Range/Units





  06:53 06:53 06:53 


 


WBC  5.64    (4.0-11.0)  K/uL


 


RBC  3.45 L    (4.30-5.90)  M/uL


 


Hgb  9.7 L    (12.0-16.0)  g/dL


 


Hct  29.0 L    (36.0-46.0)  %


 


MCV  84.1    (80.0-98.0)  fL


 


MCH  28.1    (27.0-32.0)  pg


 


MCHC  33.4    (31.0-37.0)  g/dL


 


RDW Std Deviation  53.1    (28.0-62.0)  fl


 


RDW Coeff of Brody  17 H    (11.0-15.0)  %


 


Plt Count  221    (150-400)  K/uL


 


MPV  10.60    (7.40-12.00)  fL


 


Neut % (Auto)  76.2    (48.0-80.0)  %


 


Lymph % (Auto)  14.2 L    (16.0-40.0)  %


 


Mono % (Auto)  8.3    (0.0-15.0)  %


 


Eos % (Auto)  1.1    (0.0-7.0)  %


 


Baso % (Auto)  0.2    (0.0-1.5)  %


 


Neut # (Auto)  4.3    (1.4-5.7)  K/uL


 


Lymph # (Auto)  0.8    (0.6-2.4)  K/uL


 


Mono # (Auto)  0.5    (0.0-0.8)  K/uL


 


Eos # (Auto)  0.1    (0.0-0.7)  K/uL


 


Baso # (Auto)  0.0    (0.0-0.1)  K/uL


 


Nucleated RBC %  0.0    /100WBC


 


Nucleated RBCs #  0    K/uL


 


Sodium   138   (136-145)  mmol/L


 


Potassium   3.2 L   (3.5-5.1)  mmol/L


 


Chloride   100   ()  mmol/L


 


Carbon Dioxide   30.4   (21.0-32.0)  mmol/L


 


BUN   22 H   (7.0-18.0)  mg/dL


 


Creatinine   1.1 H   (0.6-1.0)  mg/dL


 


Est Cr Clr Drug Dosing   32.46   mL/min


 


Estimated GFR (MDRD)   46.8   ml/min


 


Glucose   143 H   ()  mg/dL


 


Calcium   9.9   (8.5-10.1)  mg/dL


 


Magnesium    1.8  (1.8-2.4)  mg/dL











Result Diagrams: 


                                 08/06/21 06:53





                                 08/06/21 06:53





Sepsis Event Note





- Evaluation


Sepsis Screening Result: No Definite Risk





- Focused Exam


Vital Signs: 


                                   Vital Signs











  Temp Pulse Resp BP BP Pulse Ox


 


 08/06/21 12:00  97.7 F  73  15   113/40 L  96


 


 08/06/21 08:00  99.0 F  80  17  106/52 L   92 L


 


 08/06/21 04:00  97.5 F  79  18   113/53 L  94 L














- Problem List & Annotations


(1) Acute pulmonary edema


SNOMED Code(s): 56600334


   Code(s): J81.0 - ACUTE PULMONARY EDEMA   Status: Acute   Current Visit: Yes  







(2) Infected ulcer of skin


SNOMED Code(s): 4027238


   Code(s): L98.499 - NON-PRESSURE CHRONIC ULCER OF SKIN OF SITES W UNSP 

SEVERITY; L08.9 - LOCAL INFECTION OF THE SKIN AND SUBCUTANEOUS TISSUE, UNSP   St

atus: Acute   Current Visit: Yes   





- Problem List Review


Problem List Initiated/Reviewed/Updated: Yes





- My Orders


Last 24 Hours: 


My Active Orders





08/05/21 19:17


Echo Comp wo Cont [US] Routine 





08/05/21 21:00


Enoxaparin [Lovenox]   30 mg SUBCUT Q24H 





08/06/21 05:00


Furosemide [Lasix]   20 mg IVPUSH Q12H 














- Plan


Plan:: 


89-year-old female admitted for pulmonary edema and infected skin ulcer over the

left groin, currently on clindamycin 600 mg and Lasix 20 mg twice daily.  

Patient's fluid status is improving.  We will continue Lasix and patient will 

work with physical therapy today.  Repleted her potassium.  Repleted magnesium. 

Patient's echocardiogram showed ejection fraction of 60 to 65%.  Patient has 

mild crackles in the left lung base.  Continue duo nebs as needed.  Incentive 

spirometry.  Chest x-ray shows stable mild congestive failure and mild 

cardiomegaly.


Patient's ulcerated skin wound has progressed in size.  Mild induration noted on

exam.  We will add Flagyl to her antibiotic regimen.  Wound culture still 

pending.  We will also get an ultrasound to rule out abscess.  We will consult 

Dr. Franco regarding possible debridement.


I have seen and evaluated the patient and agree with the residents note unless 

specified in my note

## 2021-08-07 NOTE — PCM.PN
- General Info


Date of Service: 08/07/21


Subjective Update: 





Patient states less shortness of breath than previous day, still states 

orthopnea.  Patient also states improvement with ambulation and walking.  

Patient denies chest pain, fever, chills, nausea, vomiting.  States 

constipation.  Patient denies any left groin left thigh pain.





- Review of Systems


General: Denies: Fever, Chills


Pulmonary: Denies: Shortness of Breath, Cough, Wheezing


Cardiovascular: Reports: Dyspnea on Exertion, Orthopnea.  Denies: Chest Pain, 

Palpitations, Edema


Gastrointestinal: Denies: Abdominal Pain, Nausea, Vomiting


Neurological: Denies: Confusion, Dizziness, Headache





- Patient Data


Vitals - Most Recent: 


                                Last Vital Signs











Temp  97.5 F   08/07/21 11:35


 


Pulse  79   08/07/21 11:35


 


Resp  14   08/07/21 11:35


 


BP  127/55 L  08/07/21 11:35


 


Pulse Ox  94 L  08/07/21 11:35











Weight - Most Recent: 140 lb 9.6 oz


I&O - Last 24 Hours: 


                                 Intake & Output











 08/06/21 08/07/21 08/07/21





 22:59 06:59 14:59


 


Intake Total 1020 700 50


 


Output Total 500 150 


 


Balance 520 550 50











Lab Results Last 24 Hours: 


                         Laboratory Results - last 24 hr











  08/07/21 08/07/21 Range/Units





  06:25 06:25 


 


WBC  4.78   (4.0-11.0)  K/uL


 


RBC  3.23 L   (4.30-5.90)  M/uL


 


Hgb  8.9 L   (12.0-16.0)  g/dL


 


Hct  26.8 L   (36.0-46.0)  %


 


MCV  83.0   (80.0-98.0)  fL


 


MCH  27.6   (27.0-32.0)  pg


 


MCHC  33.2   (31.0-37.0)  g/dL


 


RDW Std Deviation  52.1   (28.0-62.0)  fl


 


RDW Coeff of Brody  17 H   (11.0-15.0)  %


 


Plt Count  200   (150-400)  K/uL


 


MPV  10.40   (7.40-12.00)  fL


 


Neut % (Auto)  69.9   (48.0-80.0)  %


 


Lymph % (Auto)  18.0   (16.0-40.0)  %


 


Mono % (Auto)  10.0   (0.0-15.0)  %


 


Eos % (Auto)  1.9   (0.0-7.0)  %


 


Baso % (Auto)  0.2   (0.0-1.5)  %


 


Neut # (Auto)  3.3   (1.4-5.7)  K/uL


 


Lymph # (Auto)  0.9   (0.6-2.4)  K/uL


 


Mono # (Auto)  0.5   (0.0-0.8)  K/uL


 


Eos # (Auto)  0.1   (0.0-0.7)  K/uL


 


Baso # (Auto)  0.0   (0.0-0.1)  K/uL


 


Nucleated RBC %  0.0   /100WBC


 


Nucleated RBCs #  0   K/uL


 


Sodium   139  (136-145)  mmol/L


 


Potassium   3.1 L  (3.5-5.1)  mmol/L


 


Chloride   100  ()  mmol/L


 


Carbon Dioxide   30.2  (21.0-32.0)  mmol/L


 


BUN   20 H  (7.0-18.0)  mg/dL


 


Creatinine   0.9  (0.6-1.0)  mg/dL


 


Est Cr Clr Drug Dosing   39.67  mL/min


 


Estimated GFR (MDRD)   59.0  ml/min


 


Glucose   117 H  ()  mg/dL


 


Calcium   9.2  (8.5-10.1)  mg/dL


 


Magnesium   2.0  (1.8-2.4)  mg/dL


 


Total Bilirubin   1.0  (0.2-1.0)  mg/dL


 


AST   34  (15-37)  IU/L


 


ALT   10 L  (14-63)  IU/L


 


Alkaline Phosphatase   156 H  ()  U/L


 


Total Protein   7.0  (6.4-8.2)  g/dL


 


Albumin   2.8 L  (3.4-5.0)  g/dL


 


Globulin   4.2 H  (2.6-4.0)  g/dL


 


Albumin/Globulin Ratio   0.7 L  (0.9-1.6)  











Filiberto Results Last 24 Hours: 


                                  Microbiology











 08/04/21 17:40 Miscellaneous Reference Culture - Preliminary





 Wound - Groin, Left Gram Stain - Final











Med Orders - Current: 


                               Current Medications





Acetaminophen (Acetaminophen 325 Mg Tab)  650 mg PO Q4H PRN


   PRN Reason: Pain (Mild 1-3)/fever


Albuterol/Ipratropium (Albuterol/Ipratropium 3.0-0.5 Mg/3 Ml Neb Soln)  3 ml NEB

Q4HRRT PRN


   PRN Reason: sob/wheezing


   Last Admin: 08/06/21 22:50 Dose:  3 ml


   Documented by: 


Carbidopa/Levodopa (Carbidopa/Levodopa  Mg Tab)  1.5 tab PO TID [facility]


   Last Admin: 08/07/21 05:59 Dose:  1.5 tab


   Documented by: 


Cholecalciferol (Cholecalciferol (Vitamin D3) 25 Mcg Tab)  25 mcg PO DAILY [facility]


   Last Admin: 08/07/21 08:23 Dose:  25 mcg


   Documented by: 


Cyanocobalamin (Cyanocobalamin (Vitamin B12) 500 Mcg Tab)  1,000 mcg PO DAILY 

[facility]


   Last Admin: 08/07/21 08:22 Dose:  1,000 mcg


   Documented by: 


Docusate Sodium (Docusate Sodium 100 Mg Cap)  100 mg PO DAILY [facility]


   Last Admin: 08/07/21 08:24 Dose:  100 mg


   Documented by: 


Furosemide (Furosemide 20 Mg/2 Ml Vial)  20 mg IVPUSH Q12H [facility]


   Last Admin: 08/07/21 04:29 Dose:  20 mg


   Documented by: 


Gabapentin (Gabapentin 100 Mg Cap)  100 mg PO TID [facility]


   Last Admin: 08/07/21 05:59 Dose:  100 mg


   Documented by: 


Piperacillin Sod/Tazobactam (Sod 3.375 gm/ Sodium Chloride)  100 mls @ 200 

mls/hr IV Q6H [facility]


   Last Admin: 08/07/21 10:17 Dose:  200 mls/hr


   Documented by: 


Vancomycin HCl 1 gm/ Sodium (Chloride)  250 mls @ 166.667 mls/hr IV Q24H [facility]


   Last Admin: 08/06/21 18:23 Dose:  166.667 mls/hr


   Documented by: 


Clindamycin Phosphate 600 mg/ (Premix)  50 mls @ 100 mls/hr IV Q8H [facility]


   Last Admin: 08/07/21 06:34 Dose:  100 mls/hr


   Documented by: 


Lorazepam (Lorazepam 2 Mg/Ml Sdv)  0.5 mg IVPUSH ONETIME PRN


   PRN Reason: Anxiety


   Last Admin: 08/05/21 23:56 Dose:  0.5 mg


   Documented by: 


Multivitamins/Minerals/Vitamin C (Multivitamin Tab)  1 tab PO DAILY [facility]


   Last Admin: 08/07/21 08:24 Dose:  1 tab


   Documented by: 


Pantoprazole Sodium (Pantoprazole 40 Mg Tab.Cr)  40 mg PO ACBREAKFAST [facility]


   Last Admin: 08/07/21 06:34 Dose:  40 mg


   Documented by: 


Polyethylene Glycol (Polyethylene Glycol 3350 Powder 17 Gm Packet)  17 gm PO BID

[facility]


Sertraline HCl (Sertraline 100 Mg Tab)  100 mg PO DAILY [facility]


   Last Admin: 08/07/21 08:23 Dose:  100 mg


   Documented by: 


Sodium Chloride (Sodium Chloride 0.9% 10 Ml Syringe)  10 ml FLUSH ASDIRECTED PRN


   PRN Reason: Keep Vein Open


   Last Admin: 08/04/21 13:19 Dose:  10 ml


   Documented by: 


Sodium Chloride (Sodium Chloride 0.9% 2.5 Ml Syringe)  2.5 ml FLUSH ASDIRECTED 

PRN


   PRN Reason: Keep Vein Open


   Last Admin: 08/04/21 13:19 Dose:  2.5 ml


   Documented by: 


Vancomycin HCl (Pharmacy To Dose - Vancomycin)  1 dose .XX ASDIRECTED [facility]





Discontinued Medications





Enoxaparin Sodium (Enoxaparin 40 Mg/0.4 Ml Syringe)  40 mg SUBCUT Q24H [facility]


   Last Admin: 08/04/21 22:36 Dose:  40 mg


   Documented by: 


Enoxaparin Sodium (Enoxaparin 30 Mg/0.3 Ml Syringe)  30 mg SUBCUT Q24H [facility]


   Last Admin: 08/06/21 20:38 Dose:  30 mg


   Documented by: 


Furosemide (Furosemide 20 Mg/2 Ml Vial)  20 mg IVPUSH ONETIME ONE


   Stop: 08/04/21 16:54


   Last Admin: 08/04/21 18:27 Dose:  20 mg


   Documented by: 


Furosemide (Furosemide 40 Mg/4 Ml Vial)  40 mg IVPUSH Q12H [facility]


   Last Admin: 08/05/21 17:23 Dose:  40 mg


   Documented by: 


Furosemide (Furosemide 20 Mg/2 Ml Vial)  20 mg IVPUSH NOW ONE


   Stop: 08/06/21 19:07


   Last Admin: 08/06/21 19:48 Dose:  20 mg


   Documented by: 


Clindamycin Phosphate 600 mg/ (Premix)  50 mls @ 100 mls/hr IV ONETIME ONE


   Stop: 08/04/21 12:55


   Last Admin: 08/04/21 13:19 Dose:  100 mls/hr


   Documented by: 


Sodium Chloride (Normal Saline)  500 mls @ 999 mls/hr IV .Bolus ONE


   Stop: 08/04/21 15:17


   Last Admin: 08/04/21 15:13 Dose:  999 mls/hr


   Documented by: 


Azithromycin 500 mg/ Sodium (Chloride)  250 mls @ 250 mls/hr IV ONETIME STA


   Stop: 08/04/21 18:07


   Last Admin: 08/04/21 18:01 Dose:  250 mls/hr


   Documented by: 


Ceftriaxone Sodium/Dextrose 1 (gm/ Premix)  50 mls @ 100 mls/hr IV ONETIME ONE


   Stop: 08/04/21 17:37


   Last Admin: 08/04/21 18:00 Dose:  100 mls/hr


   Documented by: 


Clindamycin Phosphate 400 mg/ (Sodium Chloride)  52.6667 mls @ 100 mls/hr IV Q8H

[facility]


   Last Admin: 08/04/21 23:30 Dose:  Not Given


   Documented by: 


Furosemide 40 mg/ Sodium (Chloride)  54 mls @ 100 mls/hr IV Q12H [facility]


Clindamycin Phosphate (Cleocin In D5w 600 Mg/50 Ml)  50 mls @ 100 mls/hr IV Q8H 

[facility]


   Last Admin: 08/06/21 14:48 Dose:  92.593 mls/hr


   Documented by: 


Magnesium Sulfate 2 gm/ Premix  50 mls @ 12.5 mls/hr IV ONETIME ONE


   Stop: 08/06/21 13:33


   Last Admin: 08/06/21 10:18 Dose:  12.5 mls/hr


   Documented by: 


Metronidazole 500 mg/ Premix  100 mls @ 100 mls/hr IV QID [facility]


   Last Admin: 08/06/21 11:38 Dose:  100 mls/hr


   Documented by: 


Clindamycin Phosphate 600 mg/ (Premix)  50 mls @ 100 mls/hr IV Q6H [facility]


Iopamidol (Iopamidol 755 Mg/Ml 50 Ml Bottle)  100 ml IV ONETIME STA


   Stop: 08/04/21 14:57


   Last Admin: 08/04/21 15:07 Dose:  100 ml


   Documented by: 


Esomeprazole 20 Mg  1 each PO QAM [facility]


Polyethylene Glycol (Polyethylene Glycol 3350 Powder 17 Gm Packet)  17 gm PO 

BEDTIME PRN


   PRN Reason: Constipation


   Last Admin: 08/06/21 20:38 Dose:  17 gm


   Documented by: 


Polyethylene Glycol (Polyethylene Glycol 3350 Powder 17 Gm Packet)  17 gm PO 

ONETIME ONE


   Stop: 08/07/21 09:27


   Last Admin: 08/07/21 10:17 Dose:  17 gm


   Documented by: 


Potassium Chloride (Potassium Chloride 20 Meq Tab.Er)  40 meq PO ONETIME ONE


   Stop: 08/05/21 08:02


   Last Admin: 08/05/21 08:30 Dose:  40 meq


   Documented by: 


Potassium Chloride (Potassium Chloride 20 Meq Tab.Er)  40 meq PO ONETIME ONE


   Stop: 08/06/21 07:48


   Last Admin: 08/06/21 08:12 Dose:  40 meq


   Documented by: 


Potassium Chloride (Potassium Chloride 20 Meq Tab.Er)  40 meq PO ONETIME ONE


   Stop: 08/07/21 08:16


   Last Admin: 08/07/21 09:16 Dose:  Not Given


   Documented by: 


Potassium Chloride (Potassium Chloride 20 Meq Tab.Er)  40 meq PO BID ONE


   Stop: 08/07/21 08:16


   Last Admin: 08/07/21 09:14 Dose:  40 meq


   Documented by: 











- Exam


General: Alert, Oriented


Lungs: Clear to Auscultation, Normal Respiratory Effort


Cardiovascular: Regular Rate


Extremities: No Pedal Edema


Psy/Mental Status: Alert





- Patient Data


Lab Results Last 24 hrs: 


                         Laboratory Results - last 24 hr











  08/07/21 08/07/21 Range/Units





  06:25 06:25 


 


WBC  4.78   (4.0-11.0)  K/uL


 


RBC  3.23 L   (4.30-5.90)  M/uL


 


Hgb  8.9 L   (12.0-16.0)  g/dL


 


Hct  26.8 L   (36.0-46.0)  %


 


MCV  83.0   (80.0-98.0)  fL


 


MCH  27.6   (27.0-32.0)  pg


 


MCHC  33.2   (31.0-37.0)  g/dL


 


RDW Std Deviation  52.1   (28.0-62.0)  fl


 


RDW Coeff of Brody  17 H   (11.0-15.0)  %


 


Plt Count  200   (150-400)  K/uL


 


MPV  10.40   (7.40-12.00)  fL


 


Neut % (Auto)  69.9   (48.0-80.0)  %


 


Lymph % (Auto)  18.0   (16.0-40.0)  %


 


Mono % (Auto)  10.0   (0.0-15.0)  %


 


Eos % (Auto)  1.9   (0.0-7.0)  %


 


Baso % (Auto)  0.2   (0.0-1.5)  %


 


Neut # (Auto)  3.3   (1.4-5.7)  K/uL


 


Lymph # (Auto)  0.9   (0.6-2.4)  K/uL


 


Mono # (Auto)  0.5   (0.0-0.8)  K/uL


 


Eos # (Auto)  0.1   (0.0-0.7)  K/uL


 


Baso # (Auto)  0.0   (0.0-0.1)  K/uL


 


Nucleated RBC %  0.0   /100WBC


 


Nucleated RBCs #  0   K/uL


 


Sodium   139  (136-145)  mmol/L


 


Potassium   3.1 L  (3.5-5.1)  mmol/L


 


Chloride   100  ()  mmol/L


 


Carbon Dioxide   30.2  (21.0-32.0)  mmol/L


 


BUN   20 H  (7.0-18.0)  mg/dL


 


Creatinine   0.9  (0.6-1.0)  mg/dL


 


Est Cr Clr Drug Dosing   39.67  mL/min


 


Estimated GFR (MDRD)   59.0  ml/min


 


Glucose   117 H  ()  mg/dL


 


Calcium   9.2  (8.5-10.1)  mg/dL


 


Magnesium   2.0  (1.8-2.4)  mg/dL


 


Total Bilirubin   1.0  (0.2-1.0)  mg/dL


 


AST   34  (15-37)  IU/L


 


ALT   10 L  (14-63)  IU/L


 


Alkaline Phosphatase   156 H  ()  U/L


 


Total Protein   7.0  (6.4-8.2)  g/dL


 


Albumin   2.8 L  (3.4-5.0)  g/dL


 


Globulin   4.2 H  (2.6-4.0)  g/dL


 


Albumin/Globulin Ratio   0.7 L  (0.9-1.6)  











Result Diagrams: 


                                 08/07/21 06:25





                                 08/07/21 06:25


Filiberto Results Last 24 hrs: 


                                  Microbiology











 08/04/21 17:40 Miscellaneous Reference Culture - Preliminary





 Wound - Groin, Left Gram Stain - Final














Sepsis Event Note





- Evaluation


Sepsis Screening Result: No Definite Risk





- Focused Exam


Vital Signs: 


                                   Vital Signs











  Temp Pulse Resp BP BP Pulse Ox


 


 08/07/21 11:35  97.5 F  79  14   127/55 L  94 L


 


 08/07/21 08:00  97.2 F  72  14  106/50 L   91 L


 


 08/07/21 04:26  97 F  68  16  120/59 L   94 L














- Problem List & Annotations


(1) Acute pulmonary edema


SNOMED Code(s): 83613441


   Code(s): J81.0 - ACUTE PULMONARY EDEMA   Status: Acute   Current Visit: Yes  







(2) Cellulitis


SNOMED Code(s): 878070294


   Code(s): L03.90 - CELLULITIS, UNSPECIFIED   Status: Acute   Current Visit: 

Yes   


Qualifiers: 


   Site of cellulitis: extremity   Site of cellulitis of extremity: lower 

extremity   Laterality: left   Qualified Code(s): L03.116 - Cellulitis of left 

lower limb   





(3) Infected ulcer of skin


SNOMED Code(s): 3911319


   Code(s): L98.499 - NON-PRESSURE CHRONIC ULCER OF SKIN OF SITES W UNSP 

SEVERITY; L08.9 - LOCAL INFECTION OF THE SKIN AND SUBCUTANEOUS TISSUE, UNSP   

Status: Acute   Current Visit: Yes   





- Problem List Review


Problem List Initiated/Reviewed/Updated: Yes





- My Orders


Last 24 Hours: 


My Active Orders





08/07/21 21:00


polyethylene glycoL 3350 [MiraLAX]   17 gm PO BID 





08/08/21 05:11


BASIC METABOLIC PANEL,BMP [CHEM] AM 


CBC WITH AUTO DIFF [HEME] AM 


MAGNESIUM [CHEM] AM 














- Plan


Plan:: 





Pulmonary Edema-Lasix 20 mg daily. Additional 20 mg as needed based on physical 

exam findings and fluid output.  Monitor BMP, magnesium, Daily weights, Strict 

I&O, DuoNeb as needed, incentive spirometry.


Skin infection left groincontinue antibiotic regimen to include clindamycin, 

vancomycin, Zosyn.  Continue with twice daily with a dry dressings.  Changes 

often as needed to keep dry, per surgery okay to shower.





Encourage daily physical therapy. Lovenox discontinued due to decreasing 

hemoglobin count.  MiraLAX increased to twice daily as patient states continued 

constipation.

## 2021-08-07 NOTE — PCM.CONSN
- General Info


Date of Service: 08/07/21


Functional Status: Reports: Pain Controlled, Tolerating Diet





- Review of Systems


General: Reports: No Symptoms


Musculoskeletal: Reports: No Symptoms


Skin: Reports: No Symptoms





- Patient Data


Vitals - Most Recent: 


                                Last Vital Signs











Temp  36.2 C   08/07/21 08:00


 


Pulse  72   08/07/21 08:00


 


Resp  14   08/07/21 08:00


 


BP  106/50 L  08/07/21 08:00


 


Pulse Ox  91 L  08/07/21 08:00











Weight - Most Recent: 63.775 kg


I&O - Last 24 Hours: 


                                 Intake & Output











 08/06/21 08/07/21 08/07/21





 22:59 06:59 14:59


 


Intake Total 1020 700 50


 


Output Total 500 150 


 


Balance 520 550 50











Lab Results Last 24 Hours: 


                         Laboratory Results - last 24 hr











  08/07/21 08/07/21 Range/Units





  06:25 06:25 


 


WBC  4.78   (4.0-11.0)  K/uL


 


RBC  3.23 L   (4.30-5.90)  M/uL


 


Hgb  8.9 L   (12.0-16.0)  g/dL


 


Hct  26.8 L   (36.0-46.0)  %


 


MCV  83.0   (80.0-98.0)  fL


 


MCH  27.6   (27.0-32.0)  pg


 


MCHC  33.2   (31.0-37.0)  g/dL


 


RDW Std Deviation  52.1   (28.0-62.0)  fl


 


RDW Coeff of Brody  17 H   (11.0-15.0)  %


 


Plt Count  200   (150-400)  K/uL


 


MPV  10.40   (7.40-12.00)  fL


 


Neut % (Auto)  69.9   (48.0-80.0)  %


 


Lymph % (Auto)  18.0   (16.0-40.0)  %


 


Mono % (Auto)  10.0   (0.0-15.0)  %


 


Eos % (Auto)  1.9   (0.0-7.0)  %


 


Baso % (Auto)  0.2   (0.0-1.5)  %


 


Neut # (Auto)  3.3   (1.4-5.7)  K/uL


 


Lymph # (Auto)  0.9   (0.6-2.4)  K/uL


 


Mono # (Auto)  0.5   (0.0-0.8)  K/uL


 


Eos # (Auto)  0.1   (0.0-0.7)  K/uL


 


Baso # (Auto)  0.0   (0.0-0.1)  K/uL


 


Nucleated RBC %  0.0   /100WBC


 


Nucleated RBCs #  0   K/uL


 


Sodium   139  (136-145)  mmol/L


 


Potassium   3.1 L  (3.5-5.1)  mmol/L


 


Chloride   100  ()  mmol/L


 


Carbon Dioxide   30.2  (21.0-32.0)  mmol/L


 


BUN   20 H  (7.0-18.0)  mg/dL


 


Creatinine   0.9  (0.6-1.0)  mg/dL


 


Est Cr Clr Drug Dosing   39.67  mL/min


 


Estimated GFR (MDRD)   59.0  ml/min


 


Glucose   117 H  ()  mg/dL


 


Calcium   9.2  (8.5-10.1)  mg/dL


 


Magnesium   2.0  (1.8-2.4)  mg/dL


 


Total Bilirubin   1.0  (0.2-1.0)  mg/dL


 


AST   34  (15-37)  IU/L


 


ALT   10 L  (14-63)  IU/L


 


Alkaline Phosphatase   156 H  ()  U/L


 


Total Protein   7.0  (6.4-8.2)  g/dL


 


Albumin   2.8 L  (3.4-5.0)  g/dL


 


Globulin   4.2 H  (2.6-4.0)  g/dL


 


Albumin/Globulin Ratio   0.7 L  (0.9-1.6)  











Filiberto Results Last 24 Hours: 


                                  Microbiology











 08/04/21 17:40 Miscellaneous Reference Culture - Preliminary





 Wound - Groin, Left Gram Stain - Final











Med Orders - Current: 


                               Current Medications





Acetaminophen (Acetaminophen 325 Mg Tab)  650 mg PO Q4H PRN


   PRN Reason: Pain (Mild 1-3)/fever


Albuterol/Ipratropium (Albuterol/Ipratropium 3.0-0.5 Mg/3 Ml Neb Soln)  3 ml NEB

Q4HRRT PRN


   PRN Reason: sob/wheezing


   Last Admin: 08/06/21 22:50 Dose:  3 ml


   Documented by: 


Carbidopa/Levodopa (Carbidopa/Levodopa  Mg Tab)  1.5 tab PO TID [facility]


   Last Admin: 08/07/21 05:59 Dose:  1.5 tab


   Documented by: 


Cholecalciferol (Cholecalciferol (Vitamin D3) 25 Mcg Tab)  25 mcg PO DAILY [facility]


   Last Admin: 08/07/21 08:23 Dose:  25 mcg


   Documented by: 


Cyanocobalamin (Cyanocobalamin (Vitamin B12) 500 Mcg Tab)  1,000 mcg PO DAILY 

[facility]


   Last Admin: 08/07/21 08:22 Dose:  1,000 mcg


   Documented by: 


Docusate Sodium (Docusate Sodium 100 Mg Cap)  100 mg PO DAILY [facility]


   Last Admin: 08/07/21 08:24 Dose:  100 mg


   Documented by: 


Enoxaparin Sodium (Enoxaparin 30 Mg/0.3 Ml Syringe)  30 mg SUBCUT Q24H [facility]


   Last Admin: 08/06/21 20:38 Dose:  30 mg


   Documented by: 


Furosemide (Furosemide 20 Mg/2 Ml Vial)  20 mg IVPUSH Q12H [facility]


   Last Admin: 08/07/21 04:29 Dose:  20 mg


   Documented by: 


Gabapentin (Gabapentin 100 Mg Cap)  100 mg PO TID [facility]


   Last Admin: 08/07/21 05:59 Dose:  100 mg


   Documented by: 


Piperacillin Sod/Tazobactam (Sod 3.375 gm/ Sodium Chloride)  100 mls @ 200 

mls/hr IV Q6H [facility]


   Last Admin: 08/07/21 04:18 Dose:  200 mls/hr


   Documented by: 


Vancomycin HCl 1 gm/ Sodium (Chloride)  250 mls @ 166.667 mls/hr IV Q24H [facility]


   Last Admin: 08/06/21 18:23 Dose:  166.667 mls/hr


   Documented by: 


Clindamycin Phosphate 600 mg/ (Premix)  50 mls @ 100 mls/hr IV Q8H [facility]


   Last Admin: 08/07/21 06:34 Dose:  100 mls/hr


   Documented by: 


Lorazepam (Lorazepam 2 Mg/Ml Sdv)  0.5 mg IVPUSH ONETIME PRN


   PRN Reason: Anxiety


   Last Admin: 08/05/21 23:56 Dose:  0.5 mg


   Documented by: 


Multivitamins/Minerals/Vitamin C (Multivitamin Tab)  1 tab PO DAILY [facility]


   Last Admin: 08/07/21 08:24 Dose:  1 tab


   Documented by: 


Pantoprazole Sodium (Pantoprazole 40 Mg Tab.Cr)  40 mg PO ACBREAKFAST [facility]


   Last Admin: 08/07/21 06:34 Dose:  40 mg


   Documented by: 


Polyethylene Glycol (Polyethylene Glycol 3350 Powder 17 Gm Packet)  17 gm PO 

BEDTIME PRN


   PRN Reason: Constipation


   Last Admin: 08/06/21 20:38 Dose:  17 gm


   Documented by: 


Sertraline HCl (Sertraline 100 Mg Tab)  100 mg PO DAILY [facility]


   Last Admin: 08/07/21 08:23 Dose:  100 mg


   Documented by: 


Sodium Chloride (Sodium Chloride 0.9% 10 Ml Syringe)  10 ml FLUSH ASDIRECTED PRN


   PRN Reason: Keep Vein Open


   Last Admin: 08/04/21 13:19 Dose:  10 ml


   Documented by: 


Sodium Chloride (Sodium Chloride 0.9% 2.5 Ml Syringe)  2.5 ml FLUSH ASDIRECTED 

PRN


   PRN Reason: Keep Vein Open


   Last Admin: 08/04/21 13:19 Dose:  2.5 ml


   Documented by: 


Vancomycin HCl (Pharmacy To Dose - Vancomycin)  1 dose .XX ASDIRECTED CHRIS





Discontinued Medications





Enoxaparin Sodium (Enoxaparin 40 Mg/0.4 Ml Syringe)  40 mg SUBCUT Q24H [facility]


   Last Admin: 08/04/21 22:36 Dose:  40 mg


   Documented by: 


Furosemide (Furosemide 20 Mg/2 Ml Vial)  20 mg IVPUSH ONETIME ONE


   Stop: 08/04/21 16:54


   Last Admin: 08/04/21 18:27 Dose:  20 mg


   Documented by: 


Furosemide (Furosemide 40 Mg/4 Ml Vial)  40 mg IVPUSH Q12H [facility]


   Last Admin: 08/05/21 17:23 Dose:  40 mg


   Documented by: 


Furosemide (Furosemide 20 Mg/2 Ml Vial)  20 mg IVPUSH NOW ONE


   Stop: 08/06/21 19:07


   Last Admin: 08/06/21 19:48 Dose:  20 mg


   Documented by: 


Clindamycin Phosphate 600 mg/ (Premix)  50 mls @ 100 mls/hr IV ONETIME ONE


   Stop: 08/04/21 12:55


   Last Admin: 08/04/21 13:19 Dose:  100 mls/hr


   Documented by: 


Sodium Chloride (Normal Saline)  500 mls @ 999 mls/hr IV .Bolus ONE


   Stop: 08/04/21 15:17


   Last Admin: 08/04/21 15:13 Dose:  999 mls/hr


   Documented by: 


Azithromycin 500 mg/ Sodium (Chloride)  250 mls @ 250 mls/hr IV ONETIME STA


   Stop: 08/04/21 18:07


   Last Admin: 08/04/21 18:01 Dose:  250 mls/hr


   Documented by: 


Ceftriaxone Sodium/Dextrose 1 (gm/ Premix)  50 mls @ 100 mls/hr IV ONETIME ONE


   Stop: 08/04/21 17:37


   Last Admin: 08/04/21 18:00 Dose:  100 mls/hr


   Documented by: 


Clindamycin Phosphate 400 mg/ (Sodium Chloride)  52.6667 mls @ 100 mls/hr IV Q8H

[facility]


   Last Admin: 08/04/21 23:30 Dose:  Not Given


   Documented by: 


Furosemide 40 mg/ Sodium (Chloride)  54 mls @ 100 mls/hr IV Q12H [facility]


Clindamycin Phosphate (Cleocin In D5w 600 Mg/50 Ml)  50 mls @ 100 mls/hr IV Q8H 

[facility]


   Last Admin: 08/06/21 14:48 Dose:  92.593 mls/hr


   Documented by: 


Magnesium Sulfate 2 gm/ Premix  50 mls @ 12.5 mls/hr IV ONETIME ONE


   Stop: 08/06/21 13:33


   Last Admin: 08/06/21 10:18 Dose:  12.5 mls/hr


   Documented by: 


Metronidazole 500 mg/ Premix  100 mls @ 100 mls/hr IV QID [facility]


   Last Admin: 08/06/21 11:38 Dose:  100 mls/hr


   Documented by: 


Clindamycin Phosphate 600 mg/ (Premix)  50 mls @ 100 mls/hr IV Q6H [facility]


Iopamidol (Iopamidol 755 Mg/Ml 50 Ml Bottle)  100 ml IV ONETIME STA


   Stop: 08/04/21 14:57


   Last Admin: 08/04/21 15:07 Dose:  100 ml


   Documented by: 


Esomeprazole 20 Mg  1 each PO QAM [facility]


Potassium Chloride (Potassium Chloride 20 Meq Tab.Er)  40 meq PO ONETIME ONE


   Stop: 08/05/21 08:02


   Last Admin: 08/05/21 08:30 Dose:  40 meq


   Documented by: 


Potassium Chloride (Potassium Chloride 20 Meq Tab.Er)  40 meq PO ONETIME ONE


   Stop: 08/06/21 07:48


   Last Admin: 08/06/21 08:12 Dose:  40 meq


   Documented by: 


Potassium Chloride (Potassium Chloride 20 Meq Tab.Er)  40 meq PO ONETIME ONE


   Stop: 08/07/21 08:16


   Last Admin: 08/07/21 09:16 Dose:  Not Given


   Documented by: 


Potassium Chloride (Potassium Chloride 20 Meq Tab.Er)  40 meq PO BID ONE


   Stop: 08/07/21 08:16


   Last Admin: 08/07/21 09:14 Dose:  40 meq


   Documented by: 











- Exam


General: Alert, Oriented


HEENT: Pupils Equal, Pupils Reactive


Lungs: Normal Respiratory Effort


Cardiovascular: Regular Rate


Skin: Other (4 cm x 1 cm wound in left groin. Appears less moist and necrotic. 

Cellulitis has lessened. Less tender. No crepitus. )





Sepsis Event Note





- Evaluation


Sepsis Screening Result: No Definite Risk





- Focused Exam


Vital Signs: 


                                   Vital Signs











  Temp Pulse Resp BP Pulse Ox


 


 08/07/21 08:00  36.2 C  72  14  106/50 L  91 L


 


 08/07/21 04:26  36.1 C  68  16  120/59 L  94 L


 


 08/06/21 23:41  37.0 C  71  17  115/60  96














Consult PN Assessment/Plan


Procedures: 


Procedures





APPLY FOREARM SPLINT (08/03/18)


ASSAY OF FERRITIN (08/18/20)


ASSAY OF NATRIURETIC PEPTIDE (02/17/15)


ASSAY OF PROTEIN SERUM (08/18/20)


ASSAY OF TROPONIN QUANT (01/26/18)


ASSAY THYROID STIM HORMONE (08/18/20)


CHEST X-RAY 2VW FRONTAL&LATL (02/17/15)


COMPLETE CBC AUTOMATED (02/09/16)


COMPLETE CBC W/AUTO DIFF WBC (08/18/20)


COMPREHEN METABOLIC PANEL (05/22/20)


CT ABD & PELV W/CONTRAST (07/12/19)


CT HEAD/BRAIN W/O DYE (01/26/18)


CT NECK SPINE W/O DYE (01/26/18)


CT THORAX DX C- (02/20/19)


CYSTOURETERO W/LITHOTRIPSY (07/18/19)


DESTRUCT B9 LESION 1-14 (08/30/16)


DESTRUCT PREMALG LES 2-14 (12/12/16)


DESTRUCT PREMALG LESION (12/12/16)


DXA BONE DENSITY AXIAL (12/12/16)


ECHO EXAM OF ABDOMEN (05/28/20)


EGD BIOPSY SINGLE/MULTIPLE (04/23/15)


ELECTROCARDIOGRAM TRACING (01/26/18)


EMERGENCY DEPT VISIT (08/16/19)


EMERGENCY DEPT VISIT (01/26/18)


EXTREMITY STUDY (05/07/15)


FLUOROGUIDE FOR SPINE INJECT (10/02/14)


FLUOROSCOPY <1 HR PHYS/QHP (07/18/19)


IMMUNIZATION ADMIN (08/16/19)


INJECT SACROILIAC JOINT (11/30/17)


INJECT SPINE LUMBAR/SACRAL (08/13/15)


IRON BINDING TEST (08/18/20)


LIPID PANEL (02/07/19)


METABOLIC PANEL TOTAL CA (07/11/19)


MICROBE SUSCEPTIBLE FILIBERTO (05/13/15)


MRI BRAIN STEM W/O & W/DYE (11/09/16)


MRI LUMBAR SPINE W/O DYE (09/23/16)


OFFICE O/P EST LOW 20-29 MIN (02/07/19)


OFFICE O/P EST MOD 30-39 MIN (04/18/17)


OFFICE O/P NEW LOW 30-44 MIN (08/30/16)


OFFICE O/P NEW MOD 45-59 MIN (03/29/16)


ORGANIC ACID SINGLE QUANT (10/25/16)


PCV13 VACCINE IM (02/09/16)


PROTHROMBIN TIME (02/09/16)


ROUTINE VENIPUNCTURE (08/18/20)


SPECIAL STAINS GROUP 1 (04/23/15)


TD VACC NO PRESV 7 YRS+ IM (08/16/19)


THER/PROPH/DIAG INJ IV PUSH (01/26/18)


TISSUE EXAM BY PATHOLOGIST (04/23/15)


URINALYSIS AUTO W/O SCOPE (05/22/20)


URINALYSIS AUTO W/SCOPE (12/03/20)


URINE BACTERIA CULTURE (05/13/15)


URINE CULTURE/COLONY COUNT (07/10/19)


VITAMIN B-12 (05/22/18)


VITAMIN D 25 HYDROXY (08/18/20)


X-RAY EXAM ABDOMEN 1 VIEW (12/03/20)


X-RAY EXAM CHEST 1 VIEW (01/26/18)


X-RAY EXAM HIP UNI 2-3 VIEWS (05/24/19)


X-RAY EXAM L-2 SPINE 4/>VWS (12/29/14)


X-RAY EXAM OF HIP (04/17/14)


X-RAY EXAM OF PELVIS (04/17/14)


X-RAY EXAM OF SHOULDER (01/29/18)


X-RAY EXAM OF WRIST (08/16/18)


X-RAY EXAM OF WRIST (08/03/18)


X-RAY EXAM UNILAT RIBS/CHEST (10/21/16)








(1) Cellulitis


SNOMED Code(s): 381586271


   Code(s): L03.90 - CELLULITIS, UNSPECIFIED   Current Visit: Yes   


Qualifiers: 


   Site of cellulitis: extremity   Site of cellulitis of extremity: lower 

extremity   Laterality: left   Qualified Code(s): L03.116 - Cellulitis of left 

lower limb   





(2) Infected ulcer of skin


SNOMED Code(s): 9261452


   Code(s): L98.499 - NON-PRESSURE CHRONIC ULCER OF SKIN OF SITES W UNSP 

SEVERITY; L08.9 - LOCAL INFECTION OF THE SKIN AND SUBCUTANEOUS TISSUE, UNSP   

Current Visit: Yes   


Problem List Initiated/Reviewed/Updated: Yes


Plan: 


Continue with BID wet to dry dressings to the area. Ok to shower. Change as 

often as needed to keep dry. Would keep current antibiotic regiment for now. 

Will continue to follow.

## 2021-08-08 NOTE — PCM.PN
- General Info


Date of Service: 08/08/21


Admission Dx/Problem (Free Text): 


                           Admission Diagnosis/Problem





Admission Diagnosis/Problem      Pulmonary edema








Subjective Update: 


Patient states that her breathing has improved and she has been working with 

incentive spirometry.  She states she is getting stronger working with PT.  

Still complains of orthopnea.  Denies fever, chills, nausea, vomiting, chest 

pain or palpitations.  She feels her infected skin lesion is getting better 

there is less pain.  There is pain with tape changes.





- Review of Systems


General: Reports: No Symptoms


HEENT: Reports: No Symptoms


Pulmonary: Reports: No Symptoms


Cardiovascular: Reports: Dyspnea on Exertion, Orthopnea.  Denies: Chest Pain, 

Palpitations, Edema


Gastrointestinal: Reports: No Symptoms


Genitourinary: Reports: No Symptoms


Musculoskeletal: Reports: No Symptoms


Neurological: Reports: No Symptoms





- Patient Data


Vitals - Most Recent: 


                                Last Vital Signs











Temp  96.7 F L  08/08/21 07:50


 


Pulse  62   08/08/21 07:50


 


Resp  14   08/08/21 07:50


 


BP  109/53 L  08/08/21 07:50


 


Pulse Ox  96   08/08/21 07:50











Weight - Most Recent: 145 lb 6 oz


I&O - Last 24 Hours: 


                                 Intake & Output











 08/07/21 08/08/21 08/08/21





 22:59 06:59 14:59


 


Intake Total 1140 650 


 


Output Total 1005 350 


 


Balance 135 300 











Lab Results Last 24 Hours: 


                         Laboratory Results - last 24 hr











  08/08/21 08/08/21 Range/Units





  06:30 06:30 


 


WBC  4.95   (4.0-11.0)  K/uL


 


RBC  3.31 L   (4.30-5.90)  M/uL


 


Hgb  9.2 L   (12.0-16.0)  g/dL


 


Hct  27.7 L   (36.0-46.0)  %


 


MCV  83.7   (80.0-98.0)  fL


 


MCH  27.8   (27.0-32.0)  pg


 


MCHC  33.2   (31.0-37.0)  g/dL


 


RDW Std Deviation  51.0   (28.0-62.0)  fl


 


RDW Coeff of Brody  17 H   (11.0-15.0)  %


 


Plt Count  214   (150-400)  K/uL


 


MPV  10.30   (7.40-12.00)  fL


 


Neut % (Auto)  66.3   (48.0-80.0)  %


 


Lymph % (Auto)  21.4   (16.0-40.0)  %


 


Mono % (Auto)  10.1   (0.0-15.0)  %


 


Eos % (Auto)  2.0   (0.0-7.0)  %


 


Baso % (Auto)  0.2   (0.0-1.5)  %


 


Neut # (Auto)  3.3   (1.4-5.7)  K/uL


 


Lymph # (Auto)  1.1   (0.6-2.4)  K/uL


 


Mono # (Auto)  0.5   (0.0-0.8)  K/uL


 


Eos # (Auto)  0.1   (0.0-0.7)  K/uL


 


Baso # (Auto)  0.0   (0.0-0.1)  K/uL


 


Nucleated RBC %  0.0   /100WBC


 


Nucleated RBCs #  0   K/uL


 


Sodium   141  (136-145)  mmol/L


 


Potassium   3.6  (3.5-5.1)  mmol/L


 


Chloride   103  ()  mmol/L


 


Carbon Dioxide   33.3 H  (21.0-32.0)  mmol/L


 


BUN   20 H  (7.0-18.0)  mg/dL


 


Creatinine   1.0  (0.6-1.0)  mg/dL


 


Est Cr Clr Drug Dosing   35.90  mL/min


 


Estimated GFR (MDRD)   52.2  ml/min


 


Glucose   119 H  ()  mg/dL


 


Calcium   9.6  (8.5-10.1)  mg/dL


 


Magnesium   2.0  (1.8-2.4)  mg/dL











Filiberto Results Last 24 Hours: 


                                  Microbiology











 08/04/21 17:40 Miscellaneous Reference Culture - Preliminary





 Wound - Groin, Left Gram Stain - Final











Med Orders - Current: 


                               Current Medications





Acetaminophen (Acetaminophen 325 Mg Tab)  650 mg PO Q4H PRN


   PRN Reason: Pain (Mild 1-3)/fever


   Last Admin: 08/07/21 20:31 Dose:  650 mg


   Documented by: 


Albuterol/Ipratropium (Albuterol/Ipratropium 3.0-0.5 Mg/3 Ml Neb Soln)  3 ml NEB

Q4HRRT PRN


   PRN Reason: sob/wheezing


   Last Admin: 08/06/21 22:50 Dose:  3 ml


   Documented by: 


Carbamide Perox/Anhydrous Glycerin (Carbamide Peroxide 6.5% Otic Soln 15 Ml 

Bottle)  0 ml EARBOTH BID PRN


   PRN Reason: Other


Carbidopa/Levodopa (Carbidopa/Levodopa  Mg Tab)  1.5 tab PO TID [facility]


   Last Admin: 08/08/21 06:03 Dose:  1.5 tab


   Documented by: 


Cholecalciferol (Cholecalciferol (Vitamin D3) 25 Mcg Tab)  25 mcg PO DAILY [facility]


   Last Admin: 08/08/21 08:12 Dose:  25 mcg


   Documented by: 


Cyanocobalamin (Cyanocobalamin (Vitamin B12) 500 Mcg Tab)  1,000 mcg PO DAILY 

[facility]


   Last Admin: 08/08/21 08:12 Dose:  1,000 mcg


   Documented by: 


Docusate Sodium (Docusate Sodium 100 Mg Cap)  100 mg PO DAILY [facility]


   Last Admin: 08/08/21 08:12 Dose:  100 mg


   Documented by: 


Furosemide (Furosemide 20 Mg/2 Ml Vial)  20 mg IVPUSH Q12H [facility]


   Last Admin: 08/08/21 04:52 Dose:  20 mg


   Documented by: 


Gabapentin (Gabapentin 100 Mg Cap)  100 mg PO TID [facility]


   Last Admin: 08/08/21 06:03 Dose:  100 mg


   Documented by: 


Piperacillin Sod/Tazobactam (Sod 3.375 gm/ Sodium Chloride)  100 mls @ 200 

mls/hr IV Q6H [facility]


   Last Admin: 08/08/21 04:52 Dose:  200 mls/hr


   Documented by: 


Vancomycin HCl 1 gm/ Sodium (Chloride)  250 mls @ 166.667 mls/hr IV Q24H [facility]


   Last Admin: 08/07/21 18:35 Dose:  166.667 mls/hr


   Documented by: 


Clindamycin Phosphate 600 mg/ (Premix)  50 mls @ 100 mls/hr IV Q8H [facility]


   Last Admin: 08/08/21 06:39 Dose:  100 mls/hr


   Documented by: 


Lorazepam (Lorazepam 2 Mg/Ml Sdv)  0.5 mg IVPUSH ONETIME PRN


   PRN Reason: Anxiety


   Last Admin: 08/05/21 23:56 Dose:  0.5 mg


   Documented by: 


Multivitamins/Minerals/Vitamin C (Multivitamin Tab)  1 tab PO DAILY [facility]


   Last Admin: 08/08/21 08:12 Dose:  1 tab


   Documented by: 


Pantoprazole Sodium (Pantoprazole 40 Mg Tab.Cr)  40 mg PO ACBREAKFAST [facility]


   Last Admin: 08/08/21 06:39 Dose:  40 mg


   Documented by: 


Polyethylene Glycol (Polyethylene Glycol 3350 Powder 17 Gm Packet)  17 gm PO BID

[facility]


   Last Admin: 08/08/21 08:13 Dose:  17 gm


   Documented by: 


Sertraline HCl (Sertraline 100 Mg Tab)  100 mg PO DAILY [facility]


   Last Admin: 08/08/21 08:12 Dose:  100 mg


   Documented by: 


Sodium Chloride (Sodium Chloride 0.9% 10 Ml Syringe)  10 ml FLUSH ASDIRECTED PRN


   PRN Reason: Keep Vein Open


   Last Admin: 08/04/21 13:19 Dose:  10 ml


   Documented by: 


Sodium Chloride (Sodium Chloride 0.9% 2.5 Ml Syringe)  2.5 ml FLUSH ASDIRECTED 

PRN


   PRN Reason: Keep Vein Open


   Last Admin: 08/08/21 08:28 Dose:  2.5 ml


   Documented by: 


Vancomycin HCl (Pharmacy To Dose - Vancomycin)  1 dose .XX ASDIRECTED [facility]





Discontinued Medications





Enoxaparin Sodium (Enoxaparin 40 Mg/0.4 Ml Syringe)  40 mg SUBCUT Q24H [facility]


   Last Admin: 08/04/21 22:36 Dose:  40 mg


   Documented by: 


Enoxaparin Sodium (Enoxaparin 30 Mg/0.3 Ml Syringe)  30 mg SUBCUT Q24H [facility]


   Last Admin: 08/06/21 20:38 Dose:  30 mg


   Documented by: 


Furosemide (Furosemide 20 Mg/2 Ml Vial)  20 mg IVPUSH ONETIME ONE


   Stop: 08/04/21 16:54


   Last Admin: 08/04/21 18:27 Dose:  20 mg


   Documented by: 


Furosemide (Furosemide 40 Mg/4 Ml Vial)  40 mg IVPUSH Q12H [facility]


   Last Admin: 08/05/21 17:23 Dose:  40 mg


   Documented by: 


Furosemide (Furosemide 20 Mg/2 Ml Vial)  20 mg IVPUSH NOW ONE


   Stop: 08/06/21 19:07


   Last Admin: 08/06/21 19:48 Dose:  20 mg


   Documented by: 


Clindamycin Phosphate 600 mg/ (Premix)  50 mls @ 100 mls/hr IV ONETIME ONE


   Stop: 08/04/21 12:55


   Last Admin: 08/04/21 13:19 Dose:  100 mls/hr


   Documented by: 


Sodium Chloride (Normal Saline)  500 mls @ 999 mls/hr IV .Bolus ONE


   Stop: 08/04/21 15:17


   Last Admin: 08/04/21 15:13 Dose:  999 mls/hr


   Documented by: 


Azithromycin 500 mg/ Sodium (Chloride)  250 mls @ 250 mls/hr IV ONETIME STA


   Stop: 08/04/21 18:07


   Last Admin: 08/04/21 18:01 Dose:  250 mls/hr


   Documented by: 


Ceftriaxone Sodium/Dextrose 1 (gm/ Premix)  50 mls @ 100 mls/hr IV ONETIME ONE


   Stop: 08/04/21 17:37


   Last Admin: 08/04/21 18:00 Dose:  100 mls/hr


   Documented by: 


Clindamycin Phosphate 400 mg/ (Sodium Chloride)  52.6667 mls @ 100 mls/hr IV Q8H

[facility]


   Last Admin: 08/04/21 23:30 Dose:  Not Given


   Documented by: 


Furosemide 40 mg/ Sodium (Chloride)  54 mls @ 100 mls/hr IV Q12H [facility]


Clindamycin Phosphate (Cleocin In D5w 600 Mg/50 Ml)  50 mls @ 100 mls/hr IV Q8H 

[facility]


   Last Admin: 08/06/21 14:48 Dose:  92.593 mls/hr


   Documented by: 


Magnesium Sulfate 2 gm/ Premix  50 mls @ 12.5 mls/hr IV ONETIME ONE


   Stop: 08/06/21 13:33


   Last Admin: 08/06/21 10:18 Dose:  12.5 mls/hr


   Documented by: 


Metronidazole 500 mg/ Premix  100 mls @ 100 mls/hr IV QID [facility]


   Last Admin: 08/06/21 11:38 Dose:  100 mls/hr


   Documented by: 


Clindamycin Phosphate 600 mg/ (Premix)  50 mls @ 100 mls/hr IV Q6H [facility]


Iopamidol (Iopamidol 755 Mg/Ml 50 Ml Bottle)  100 ml IV ONETIME STA


   Stop: 08/04/21 14:57


   Last Admin: 08/04/21 15:07 Dose:  100 ml


   Documented by: 


Esomeprazole 20 Mg  1 each PO QAM [facility]


Polyethylene Glycol (Polyethylene Glycol 3350 Powder 17 Gm Packet)  17 gm PO BE

DTIME PRN


   PRN Reason: Constipation


   Last Admin: 08/06/21 20:38 Dose:  17 gm


   Documented by: 


Polyethylene Glycol (Polyethylene Glycol 3350 Powder 17 Gm Packet)  17 gm PO 

ONETIME ONE


   Stop: 08/07/21 09:27


   Last Admin: 08/07/21 10:17 Dose:  17 gm


   Documented by: 


Potassium Chloride (Potassium Chloride 20 Meq Tab.Er)  40 meq PO ONETIME ONE


   Stop: 08/05/21 08:02


   Last Admin: 08/05/21 08:30 Dose:  40 meq


   Documented by: 


Potassium Chloride (Potassium Chloride 20 Meq Tab.Er)  40 meq PO ONETIME ONE


   Stop: 08/06/21 07:48


   Last Admin: 08/06/21 08:12 Dose:  40 meq


   Documented by: 


Potassium Chloride (Potassium Chloride 20 Meq Tab.Er)  40 meq PO ONETIME ONE


   Stop: 08/07/21 08:16


   Last Admin: 08/07/21 09:16 Dose:  Not Given


   Documented by: 


Potassium Chloride (Potassium Chloride 20 Meq Tab.Er)  40 meq PO BID ONE


   Stop: 08/07/21 08:16


   Last Admin: 08/07/21 09:14 Dose:  40 meq


   Documented by: 











- Exam


General: Alert, Oriented, Cooperative


HEENT: Pupils Equal


Neck: Supple, No JVD


Lungs: Clear to Auscultation.  No: Crackles


Cardiovascular: Regular Rate, No Murmurs


GI/Abdominal Exam: Normal Bowel Sounds, Soft, Non-Tender


Extremities: Other (Left groin skin ulcer appears improved with margin of 

erythema regressing.  Serosanguineous discharge.  Granulation tissue now 

developing.).  No: Duy's Sign, Leg Pain


Peripheral Pulses: 2+: Dorsalis Pedis (L), Dorsalis Pedis (R)


Wound/Incisions: Drainage, Erythema Improving


Neurological: No New Focal Deficit





- Patient Data


Lab Results Last 24 hrs: 


                         Laboratory Results - last 24 hr











  08/08/21 08/08/21 Range/Units





  06:30 06:30 


 


WBC  4.95   (4.0-11.0)  K/uL


 


RBC  3.31 L   (4.30-5.90)  M/uL


 


Hgb  9.2 L   (12.0-16.0)  g/dL


 


Hct  27.7 L   (36.0-46.0)  %


 


MCV  83.7   (80.0-98.0)  fL


 


MCH  27.8   (27.0-32.0)  pg


 


MCHC  33.2   (31.0-37.0)  g/dL


 


RDW Std Deviation  51.0   (28.0-62.0)  fl


 


RDW Coeff of Brody  17 H   (11.0-15.0)  %


 


Plt Count  214   (150-400)  K/uL


 


MPV  10.30   (7.40-12.00)  fL


 


Neut % (Auto)  66.3   (48.0-80.0)  %


 


Lymph % (Auto)  21.4   (16.0-40.0)  %


 


Mono % (Auto)  10.1   (0.0-15.0)  %


 


Eos % (Auto)  2.0   (0.0-7.0)  %


 


Baso % (Auto)  0.2   (0.0-1.5)  %


 


Neut # (Auto)  3.3   (1.4-5.7)  K/uL


 


Lymph # (Auto)  1.1   (0.6-2.4)  K/uL


 


Mono # (Auto)  0.5   (0.0-0.8)  K/uL


 


Eos # (Auto)  0.1   (0.0-0.7)  K/uL


 


Baso # (Auto)  0.0   (0.0-0.1)  K/uL


 


Nucleated RBC %  0.0   /100WBC


 


Nucleated RBCs #  0   K/uL


 


Sodium   141  (136-145)  mmol/L


 


Potassium   3.6  (3.5-5.1)  mmol/L


 


Chloride   103  ()  mmol/L


 


Carbon Dioxide   33.3 H  (21.0-32.0)  mmol/L


 


BUN   20 H  (7.0-18.0)  mg/dL


 


Creatinine   1.0  (0.6-1.0)  mg/dL


 


Est Cr Clr Drug Dosing   35.90  mL/min


 


Estimated GFR (MDRD)   52.2  ml/min


 


Glucose   119 H  ()  mg/dL


 


Calcium   9.6  (8.5-10.1)  mg/dL


 


Magnesium   2.0  (1.8-2.4)  mg/dL











Result Diagrams: 


                                 08/08/21 06:30





                                 08/08/21 06:30


Filiberto Results Last 24 hrs: 


                                  Microbiology











 08/04/21 17:40 Miscellaneous Reference Culture - Preliminary





 Wound - Groin, Left Gram Stain - Final














Sepsis Event Note





- Evaluation


Sepsis Screening Result: No Definite Risk





- Focused Exam


Vital Signs: 


                                   Vital Signs











  Temp Pulse Resp BP Pulse Ox


 


 08/08/21 07:50  96.7 F L  62  14  109/53 L  96


 


 08/08/21 04:51  96 F L  66  18  100/55 L  92 L


 


 08/08/21 00:00  98 F  79  19  101/54 L  90 L














- Problem List & Annotations


(1) Acute pulmonary edema


SNOMED Code(s): 31749078


   Code(s): J81.0 - ACUTE PULMONARY EDEMA   Status: Acute   Current Visit: Yes  







(2) Infected ulcer of skin


SNOMED Code(s): 2431384


   Code(s): L98.499 - NON-PRESSURE CHRONIC ULCER OF SKIN OF SITES W UNSP 

SEVERITY; L08.9 - LOCAL INFECTION OF THE SKIN AND SUBCUTANEOUS TISSUE, UNSP   

Status: Acute   Current Visit: Yes   





- Problem List Review


Problem List Initiated/Reviewed/Updated: Yes





- My Orders


Last 24 Hours: 


My Active Orders





08/08/21 09:13


May Shower [RC] ASDIRECTED 





08/08/21 10:39


Carbamide Peroxide [Debrox 6.5% Otic Soln]   See Dose Instructions  EARBOTH BID 

PRN 





08/08/21 10:42


RT Evaluate for Home Oxygen [RC] Click to Edit 





08/08/21 17:00


VANCOMYCIN TROUGH [CHEM] Routine 





08/09/21 05:11


BASIC METABOLIC PANEL,BMP [CHEM] AM 


CBC W/O DIFF,HEMOGRAM [HEME] AM 














- Plan


Plan:: 


89-year-old female admitted for pulmonary edema and infected skin ulceration 

with cellulitis.  Continue patient on Lasix 20 mg daily.  She does not appear 

fluid overloaded and her lungs are clear.  Strict I's and O's.  Duo nebs as 

needed.  She is working hard with incentive spirometry.


Patient is on IV vancomycin, Zosyn and clindamycin for her infected skin 

ulceration.  The cellulitis has regressed and appears to be improving.  Continue

 with dressing changes.  Surgery is following.  We will transition patient to 

oral antibiotics tomorrow in anticipation for discharge.


Patient will need home oxygen trial.  Outpatient PT/OT and home health.  

Outpatient follow-up with surgery, Dr. Franco.

## 2021-08-08 NOTE — PCM.CONSN
- General Info


Date of Service: 08/01/21


Functional Status: Reports: Pain Controlled, Tolerating Diet, Ambulating, 

Urinating.  Denies: New Symptoms





- Review of Systems


General: Reports: No Symptoms


Skin: Reports: Other (Complains of tape on skin as it hurts to change dressings.

)





- Patient Data


Vitals - Most Recent: 


                                Last Vital Signs











Temp  35.9 C L  08/08/21 07:50


 


Pulse  62   08/08/21 07:50


 


Resp  14   08/08/21 07:50


 


BP  109/53 L  08/08/21 07:50


 


Pulse Ox  96   08/08/21 07:50











Weight - Most Recent: 65.941 kg


I&O - Last 24 Hours: 


                                 Intake & Output











 08/07/21 08/08/21 08/08/21





 22:59 06:59 14:59


 


Intake Total 1140 650 


 


Output Total 1005 350 


 


Balance 135 300 











Lab Results Last 24 Hours: 


                         Laboratory Results - last 24 hr











  08/08/21 08/08/21 Range/Units





  06:30 06:30 


 


WBC  4.95   (4.0-11.0)  K/uL


 


RBC  3.31 L   (4.30-5.90)  M/uL


 


Hgb  9.2 L   (12.0-16.0)  g/dL


 


Hct  27.7 L   (36.0-46.0)  %


 


MCV  83.7   (80.0-98.0)  fL


 


MCH  27.8   (27.0-32.0)  pg


 


MCHC  33.2   (31.0-37.0)  g/dL


 


RDW Std Deviation  51.0   (28.0-62.0)  fl


 


RDW Coeff of Brody  17 H   (11.0-15.0)  %


 


Plt Count  214   (150-400)  K/uL


 


MPV  10.30   (7.40-12.00)  fL


 


Neut % (Auto)  66.3   (48.0-80.0)  %


 


Lymph % (Auto)  21.4   (16.0-40.0)  %


 


Mono % (Auto)  10.1   (0.0-15.0)  %


 


Eos % (Auto)  2.0   (0.0-7.0)  %


 


Baso % (Auto)  0.2   (0.0-1.5)  %


 


Neut # (Auto)  3.3   (1.4-5.7)  K/uL


 


Lymph # (Auto)  1.1   (0.6-2.4)  K/uL


 


Mono # (Auto)  0.5   (0.0-0.8)  K/uL


 


Eos # (Auto)  0.1   (0.0-0.7)  K/uL


 


Baso # (Auto)  0.0   (0.0-0.1)  K/uL


 


Nucleated RBC %  0.0   /100WBC


 


Nucleated RBCs #  0   K/uL


 


Sodium   141  (136-145)  mmol/L


 


Potassium   3.6  (3.5-5.1)  mmol/L


 


Chloride   103  ()  mmol/L


 


Carbon Dioxide   33.3 H  (21.0-32.0)  mmol/L


 


BUN   20 H  (7.0-18.0)  mg/dL


 


Creatinine   1.0  (0.6-1.0)  mg/dL


 


Est Cr Clr Drug Dosing   35.90  mL/min


 


Estimated GFR (MDRD)   52.2  ml/min


 


Glucose   119 H  ()  mg/dL


 


Calcium   9.6  (8.5-10.1)  mg/dL


 


Magnesium   2.0  (1.8-2.4)  mg/dL











Filiberto Results Last 24 Hours: 


                                  Microbiology











 08/04/21 17:40 Miscellaneous Reference Culture - Preliminary





 Wound - Groin, Left Gram Stain - Final











Med Orders - Current: 


                               Current Medications





Acetaminophen (Acetaminophen 325 Mg Tab)  650 mg PO Q4H PRN


   PRN Reason: Pain (Mild 1-3)/fever


   Last Admin: 08/07/21 20:31 Dose:  650 mg


   Documented by: 


Albuterol/Ipratropium (Albuterol/Ipratropium 3.0-0.5 Mg/3 Ml Neb Soln)  3 ml NEB

Q4HRRT PRN


   PRN Reason: sob/wheezing


   Last Admin: 08/06/21 22:50 Dose:  3 ml


   Documented by: 


Carbidopa/Levodopa (Carbidopa/Levodopa  Mg Tab)  1.5 tab PO TID [facility]


   Last Admin: 08/08/21 06:03 Dose:  1.5 tab


   Documented by: 


Cholecalciferol (Cholecalciferol (Vitamin D3) 25 Mcg Tab)  25 mcg PO DAILY [facility]


   Last Admin: 08/08/21 08:12 Dose:  25 mcg


   Documented by: 


Cyanocobalamin (Cyanocobalamin (Vitamin B12) 500 Mcg Tab)  1,000 mcg PO DAILY 

[facility]


   Last Admin: 08/08/21 08:12 Dose:  1,000 mcg


   Documented by: 


Docusate Sodium (Docusate Sodium 100 Mg Cap)  100 mg PO DAILY [facility]


   Last Admin: 08/08/21 08:12 Dose:  100 mg


   Documented by: 


Furosemide (Furosemide 20 Mg/2 Ml Vial)  20 mg IVPUSH Q12H [facility]


   Last Admin: 08/08/21 04:52 Dose:  20 mg


   Documented by: 


Gabapentin (Gabapentin 100 Mg Cap)  100 mg PO TID [facility]


   Last Admin: 08/08/21 06:03 Dose:  100 mg


   Documented by: 


Piperacillin Sod/Tazobactam (Sod 3.375 gm/ Sodium Chloride)  100 mls @ 200 

mls/hr IV Q6H [facility]


   Last Admin: 08/08/21 04:52 Dose:  200 mls/hr


   Documented by: 


Vancomycin HCl 1 gm/ Sodium (Chloride)  250 mls @ 166.667 mls/hr IV Q24H [facility]


   Last Admin: 08/07/21 18:35 Dose:  166.667 mls/hr


   Documented by: 


Clindamycin Phosphate 600 mg/ (Premix)  50 mls @ 100 mls/hr IV Q8H [facility]


   Last Admin: 08/08/21 06:39 Dose:  100 mls/hr


   Documented by: 


Lorazepam (Lorazepam 2 Mg/Ml Sdv)  0.5 mg IVPUSH ONETIME PRN


   PRN Reason: Anxiety


   Last Admin: 08/05/21 23:56 Dose:  0.5 mg


   Documented by: 


Multivitamins/Minerals/Vitamin C (Multivitamin Tab)  1 tab PO DAILY [facility]


   Last Admin: 08/08/21 08:12 Dose:  1 tab


   Documented by: 


Pantoprazole Sodium (Pantoprazole 40 Mg Tab.Cr)  40 mg PO ACBREAKFAST [facility]


   Last Admin: 08/08/21 06:39 Dose:  40 mg


   Documented by: 


Polyethylene Glycol (Polyethylene Glycol 3350 Powder 17 Gm Packet)  17 gm PO BID

[facility]


   Last Admin: 08/08/21 08:13 Dose:  17 gm


   Documented by: 


Sertraline HCl (Sertraline 100 Mg Tab)  100 mg PO DAILY [facility]


   Last Admin: 08/08/21 08:12 Dose:  100 mg


   Documented by: 


Sodium Chloride (Sodium Chloride 0.9% 10 Ml Syringe)  10 ml FLUSH ASDIRECTED PRN


   PRN Reason: Keep Vein Open


   Last Admin: 08/04/21 13:19 Dose:  10 ml


   Documented by: 


Sodium Chloride (Sodium Chloride 0.9% 2.5 Ml Syringe)  2.5 ml FLUSH ASDIRECTED 

PRN


   PRN Reason: Keep Vein Open


   Last Admin: 08/08/21 08:28 Dose:  2.5 ml


   Documented by: 


Vancomycin HCl (Pharmacy To Dose - Vancomycin)  1 dose .XX ASDIRECTED [facility]





Discontinued Medications





Enoxaparin Sodium (Enoxaparin 40 Mg/0.4 Ml Syringe)  40 mg SUBCUT Q24H [facility]


   Last Admin: 08/04/21 22:36 Dose:  40 mg


   Documented by: 


Enoxaparin Sodium (Enoxaparin 30 Mg/0.3 Ml Syringe)  30 mg SUBCUT Q24H [facility]


   Last Admin: 08/06/21 20:38 Dose:  30 mg


   Documented by: 


Furosemide (Furosemide 20 Mg/2 Ml Vial)  20 mg IVPUSH ONETIME ONE


   Stop: 08/04/21 16:54


   Last Admin: 08/04/21 18:27 Dose:  20 mg


   Documented by: 


Furosemide (Furosemide 40 Mg/4 Ml Vial)  40 mg IVPUSH Q12H [facility]


   Last Admin: 08/05/21 17:23 Dose:  40 mg


   Documented by: 


Furosemide (Furosemide 20 Mg/2 Ml Vial)  20 mg IVPUSH NOW ONE


   Stop: 08/06/21 19:07


   Last Admin: 08/06/21 19:48 Dose:  20 mg


   Documented by: 


Clindamycin Phosphate 600 mg/ (Premix)  50 mls @ 100 mls/hr IV ONETIME ONE


   Stop: 08/04/21 12:55


   Last Admin: 08/04/21 13:19 Dose:  100 mls/hr


   Documented by: 


Sodium Chloride (Normal Saline)  500 mls @ 999 mls/hr IV .Bolus ONE


   Stop: 08/04/21 15:17


   Last Admin: 08/04/21 15:13 Dose:  999 mls/hr


   Documented by: 


Azithromycin 500 mg/ Sodium (Chloride)  250 mls @ 250 mls/hr IV ONETIME STA


   Stop: 08/04/21 18:07


   Last Admin: 08/04/21 18:01 Dose:  250 mls/hr


   Documented by: 


Ceftriaxone Sodium/Dextrose 1 (gm/ Premix)  50 mls @ 100 mls/hr IV ONETIME ONE


   Stop: 08/04/21 17:37


   Last Admin: 08/04/21 18:00 Dose:  100 mls/hr


   Documented by: 


Clindamycin Phosphate 400 mg/ (Sodium Chloride)  52.6667 mls @ 100 mls/hr IV Q8H

[facility]


   Last Admin: 08/04/21 23:30 Dose:  Not Given


   Documented by: 


Furosemide 40 mg/ Sodium (Chloride)  54 mls @ 100 mls/hr IV Q12H [facility]


Clindamycin Phosphate (Cleocin In D5w 600 Mg/50 Ml)  50 mls @ 100 mls/hr IV Q8H 

[facility]


   Last Admin: 08/06/21 14:48 Dose:  92.593 mls/hr


   Documented by: 


Magnesium Sulfate 2 gm/ Premix  50 mls @ 12.5 mls/hr IV ONETIME ONE


   Stop: 08/06/21 13:33


   Last Admin: 08/06/21 10:18 Dose:  12.5 mls/hr


   Documented by: 


Metronidazole 500 mg/ Premix  100 mls @ 100 mls/hr IV QID [facility]


   Last Admin: 08/06/21 11:38 Dose:  100 mls/hr


   Documented by: 


Clindamycin Phosphate 600 mg/ (Premix)  50 mls @ 100 mls/hr IV Q6H [facility]


Iopamidol (Iopamidol 755 Mg/Ml 50 Ml Bottle)  100 ml IV ONETIME STA


   Stop: 08/04/21 14:57


   Last Admin: 08/04/21 15:07 Dose:  100 ml


   Documented by: 


Esomeprazole 20 Mg  1 each PO QAM [facility]


Polyethylene Glycol (Polyethylene Glycol 3350 Powder 17 Gm Packet)  17 gm PO 

BEDTIME PRN


   PRN Reason: Constipation


   Last Admin: 08/06/21 20:38 Dose:  17 gm


   Documented by: 


Polyethylene Glycol (Polyethylene Glycol 3350 Powder 17 Gm Packet)  17 gm PO 

ONETIME ONE


   Stop: 08/07/21 09:27


   Last Admin: 08/07/21 10:17 Dose:  17 gm


   Documented by: 


Potassium Chloride (Potassium Chloride 20 Meq Tab.Er)  40 meq PO ONETIME ONE


   Stop: 08/05/21 08:02


   Last Admin: 08/05/21 08:30 Dose:  40 meq


   Documented by: 


Potassium Chloride (Potassium Chloride 20 Meq Tab.Er)  40 meq PO ONETIME ONE


   Stop: 08/06/21 07:48


   Last Admin: 08/06/21 08:12 Dose:  40 meq


   Documented by: 


Potassium Chloride (Potassium Chloride 20 Meq Tab.Er)  40 meq PO ONETIME ONE


   Stop: 08/07/21 08:16


   Last Admin: 08/07/21 09:16 Dose:  Not Given


   Documented by: 


Potassium Chloride (Potassium Chloride 20 Meq Tab.Er)  40 meq PO BID ONE


   Stop: 08/07/21 08:16


   Last Admin: 08/07/21 09:14 Dose:  40 meq


   Documented by: 











- Exam


General: Alert, Oriented, Severe Distress


Lungs: Normal Respiratory Effort


Cardiovascular: Regular Rate


Skin: Other (Wound red and has some granulation tissue. Drainage is bloody. 

Cellulitis is limited now to wound edges. )





Sepsis Event Note





- Evaluation


Sepsis Screening Result: No Definite Risk





- Focused Exam


Vital Signs: 


                                   Vital Signs











  Temp Pulse Resp BP Pulse Ox


 


 08/08/21 07:50  35.9 C L  62  14  109/53 L  96


 


 08/08/21 04:51  35.5 C L  66  18  100/55 L  92 L


 


 08/08/21 00:00  36.6 C  79  19  101/54 L  90 L














Consult PN Assessment/Plan


Procedures: 


Procedures





APPLY FOREARM SPLINT (08/03/18)


ASSAY OF FERRITIN (08/18/20)


ASSAY OF NATRIURETIC PEPTIDE (02/17/15)


ASSAY OF PROTEIN SERUM (08/18/20)


ASSAY OF TROPONIN QUANT (01/26/18)


ASSAY THYROID STIM HORMONE (08/18/20)


CHEST X-RAY 2VW FRONTAL&LATL (02/17/15)


COMPLETE CBC AUTOMATED (02/09/16)


COMPLETE CBC W/AUTO DIFF WBC (08/18/20)


COMPREHEN METABOLIC PANEL (05/22/20)


CT ABD & PELV W/CONTRAST (07/12/19)


CT HEAD/BRAIN W/O DYE (01/26/18)


CT NECK SPINE W/O DYE (01/26/18)


CT THORAX DX C- (02/20/19)


CYSTOURETERO W/LITHOTRIPSY (07/18/19)


DESTRUCT B9 LESION 1-14 (08/30/16)


DESTRUCT PREMALG LES 2-14 (12/12/16)


DESTRUCT PREMALG LESION (12/12/16)


DXA BONE DENSITY AXIAL (12/12/16)


ECHO EXAM OF ABDOMEN (05/28/20)


EGD BIOPSY SINGLE/MULTIPLE (04/23/15)


ELECTROCARDIOGRAM TRACING (01/26/18)


EMERGENCY DEPT VISIT (08/16/19)


EMERGENCY DEPT VISIT (01/26/18)


EXTREMITY STUDY (05/07/15)


FLUOROGUIDE FOR SPINE INJECT (10/02/14)


FLUOROSCOPY <1 HR PHYS/QHP (07/18/19)


IMMUNIZATION ADMIN (08/16/19)


INJECT SACROILIAC JOINT (11/30/17)


INJECT SPINE LUMBAR/SACRAL (08/13/15)


IRON BINDING TEST (08/18/20)


LIPID PANEL (02/07/19)


METABOLIC PANEL TOTAL CA (07/11/19)


MICROBE SUSCEPTIBLE FILIBERTO (05/13/15)


MRI BRAIN STEM W/O & W/DYE (11/09/16)


MRI LUMBAR SPINE W/O DYE (09/23/16)


OFFICE O/P EST LOW 20-29 MIN (02/07/19)


OFFICE O/P EST MOD 30-39 MIN (04/18/17)


OFFICE O/P NEW LOW 30-44 MIN (08/30/16)


OFFICE O/P NEW MOD 45-59 MIN (03/29/16)


ORGANIC ACID SINGLE QUANT (10/25/16)


PCV13 VACCINE IM (02/09/16)


PROTHROMBIN TIME (02/09/16)


ROUTINE VENIPUNCTURE (08/18/20)


SPECIAL STAINS GROUP 1 (04/23/15)


TD VACC NO PRESV 7 YRS+ IM (08/16/19)


THER/PROPH/DIAG INJ IV PUSH (01/26/18)


TISSUE EXAM BY PATHOLOGIST (04/23/15)


URINALYSIS AUTO W/O SCOPE (05/22/20)


URINALYSIS AUTO W/SCOPE (12/03/20)


URINE BACTERIA CULTURE (05/13/15)


URINE CULTURE/COLONY COUNT (07/10/19)


VITAMIN B-12 (05/22/18)


VITAMIN D 25 HYDROXY (08/18/20)


X-RAY EXAM ABDOMEN 1 VIEW (12/03/20)


X-RAY EXAM CHEST 1 VIEW (01/26/18)


X-RAY EXAM HIP UNI 2-3 VIEWS (05/24/19)


X-RAY EXAM L-2 SPINE 4/>VWS (12/29/14)


X-RAY EXAM OF HIP (04/17/14)


X-RAY EXAM OF PELVIS (04/17/14)


X-RAY EXAM OF SHOULDER (01/29/18)


X-RAY EXAM OF WRIST (08/16/18)


X-RAY EXAM OF WRIST (08/03/18)


X-RAY EXAM UNILAT RIBS/CHEST (10/21/16)








(1) Cellulitis


SNOMED Code(s): 931470665


   Code(s): L03.90 - CELLULITIS, UNSPECIFIED   Current Visit: Yes   


Qualifiers: 


   Site of cellulitis: extremity   Site of cellulitis of extremity: lower 

extremity   Laterality: left   Qualified Code(s): L03.116 - Cellulitis of left 

lower limb   





(2) Infected ulcer of skin


SNOMED Code(s): 1043888


   Code(s): L98.499 - NON-PRESSURE CHRONIC ULCER OF SKIN OF SITES W UNSP 

SEVERITY; L08.9 - LOCAL INFECTION OF THE SKIN AND SUBCUTANEOUS TISSUE, UNSP   

Current Visit: Yes   


Problem List Initiated/Reviewed/Updated: Yes


My Orders Last 24 Hours: 


My Active Orders





08/07/21 09:33


Dressing Change [Wound Care] [RC] Q12H 











Plan: 





The wound has improved greatly. The base is  and starting to form 

granulation tissue. The wound is now bleeding with dressing changes which is 

better. The cellulitis has almost fully resolved. Ok to switch to oral 

antibiotics. Shower today then switch to aquacel ag dressings to be applied 

daily. If she cannot change these by herself she can come to our office for 

daily changes. Follow up in my clinic Wednesday.

## 2021-08-09 NOTE — PCM.DCSUM1
**Discharge Summary





- Hospital Course


Free Text/Narrative:: 


89-year-old female with history of hypertension presented to the ED complaining 

of 5 days of dyspnea on exertion and shortness of breath.  She also had an open 

wound in the left groin.  She lives at home with her daughter.  She uses a 

walker at home for mobility.  For the past 5 days she did experience progressing

dyspnea with exertion and lower extremity edema.  She had been sleeping with the

head of her bed elevated for the last 5 years.  No symptoms of PND.  She denies 

chest pain, cough, palpitations, abdominal pain, fever, chills dysuria or 

fatigue.  She noticed an ulceration on her left inner thigh/groin 2 days prior. 

It increased in size and began draining purulent discharge.  She denied pain as 

a result of her neuropathy.  No history of diabetes.  No history of cellulitis. 

Denied history of CHF or COPD.  Her antihypertensive medications were 

discontinued by her PCP roughly 5 years ago due to low blood pressures.  In the 

ER her hemoglobin was 9.5, WBC 7.4, BUN 20, creatinine 0.8.  .  D-dimer 

2.36.  CT angio did not show pulmonary embolism but positive for extensive 

pulmonary edema.  She was charted on 2 L nasal cannula.  CT abdomen pelvis was 

done to rule out deep space infection or abscess in her left thigh.  Wound 

cultures returned negative.  Patient was started on Lasix 40 mg twice daily and 

required potassium supplementation.  She was started on clindamycin.  Surgery 

was consulted and we decided to broaden her antibiotics, added vancomycin and 

Zosyn.  Dressings were changed twice daily with wet-to-dry.  Her wound infection

and surrounding cellulitis improved.  She is being discharged on Keflex and 

doxycycline.  Patient's respiratory status improved and she also worked with PT.

 Lasix was downgraded to 20 mg twice daily.  She will be following with Dr. Franco in the clinic for her skin infection.  She will follow with her PCP to 

monitor her Lasix and potassium levels.  She was set up with home health, PT and

OT outpatient. As per nursing ambulatory assessment for home oxygen, she will be

requiring home oxygen. Appropriate forms were filled out.








- Discharge Data


Discharge Date: 08/09/21


Discharge Disposition: Home, W Home Health Agency 06


Condition: Stable





- Referral to Home Health


Date of Face to Face Encounter: 08/07/21


Reason for Homebound Status: 89 F DIFFCULTY WALKING SHORT DISTANCES DUE TO SOB 

AND WEAKNESS SECONDARY TO CHF


Primary Care Physician: 


Leo Guzman MD





Skilled Need: PT- AMBULATION, STRENGTHINING.  OT- DIFFCULTY WITH DAILY ACTIVITES

SUCH AS SELF DRESSING





- Discharge Diagnosis/Problem(s)


(1) Acute pulmonary edema


SNOMED Code(s): 55773971


   ICD Code: J81.0 - ACUTE PULMONARY EDEMA   Status: Acute   Current Visit: Yes 

 





(2) Infected ulcer of skin


SNOMED Code(s): 5863680


   ICD Code: L98.499 - NON-PRESSURE CHRONIC ULCER OF SKIN OF SITES W UNSP 

SEVERITY; L08.9 - LOCAL INFECTION OF THE SKIN AND SUBCUTANEOUS TISSUE, UNSP   

Status: Acute   Current Visit: Yes   





- Patient Summary/Data


Consults: 


                                  Consultations





08/05/21 09:54


PT Evaluation and Treatment [CONS] Routine 





08/05/21 10:34


Consult to Wound Care Services [CONS] Routine 














- Patient Instructions


Diet: Heart Healthy Diet


Fluid Restriction: 2000 mL


Activity: As Tolerated


Showering/Bathing: May Shower


Wound/Incision Care: Keep Operative Site/Wound Site Clean and Dry, Change 

Dressing Daily


Notify Provider of: Fever, Increased Pain, Swelling and Redness, Drainage


Other/Special Instructions: You were admitted for pulmonary edema which means 

you had fluid in your lungs.  You were treated with furosemide (Lasix), which 

makes you urinate to get the fluid out.  We will be starting you on Lasix 20 mg 

daily at home.  This medication can lower your potassium levels so we will also 

be giving you daily potassium supplement.  You must follow-up with your PCP to 

continue this medication and watch her potassium levels.  For your skin ulcer we

 are discharging you on 2 antibiotics.  1.  Keflex 500 mg to be taken every 6 

hours.  7 days total.  2.  Doxycycline 100 mg to be taken every 12 hours.  10 

days total.  He will also follow-up with the surgeon Dr. Franco for your skin 

infection.  You are encouraged to eat a low-sodium diet.  You should check your 

weight daily as increasing weight on the scale will be an indication that you 

are retaining more fluid.  Please follow-up with your primary care doctor in 

regards to your hospital stay, starting Lasix and to ensure your skin heals 

appropriately.  You are being started on home oxygen therapy because you are not

 able to maintain appropriate oxygenation level when you walk.





- Discharge Plan


*PRESCRIPTION DRUG MONITORING PROGRAM REVIEWED*: Not Applicable


*COPY OF PRESCRIPTION DRUG MONITORING REPORT IN PATIENT EDDA: Not Applicable


Prescriptions/Med Rec: 


cephALEXin [Cephalexin] 500 mg PO Q6HR 7 Days #28 tablet


Furosemide [Lasix] 20 mg PO DAILY 30 Days #30 tablet


Potassium Chloride 20 meq PO DAILY #30 tab.er.prt


Doxycycline [Vibra-Tabs] 100 mg PO Q12HR 10 Days #20 tab


Home Medications: 


                                    Home Meds





Sertraline HCl [Zoloft] 100 mg PO BID 08/03/18 [History]


Cholecalciferol (Vitamin D3) [Vitamin D3] 1,000 unit PO DAILY 07/17/19 [History]


Cyanocobalamin (Vitamin B12) [Vitamin B12] 1,000 mcg PO DAILY 07/17/19 [History]


Esomeprazole Magnesium [Nexium 24Hr] 20 mg PO QAM 07/17/19 [History]


Multivitamin with Minerals [Hair, Skin and Nails] 1 tab PO DAILY 07/17/19 

[History]


Carbidopa/Levodopa [Carbidopa-Levodopa ] 37.5 - 150 mg PO TID 08/05/21 

[History]


Docusate Sodium 100 mg PO DAILY 08/05/21 [History]


Finasteride 1 mg PO DAILY 08/05/21 [History]


Gabapentin [Neurontin] 100 mg PO TID 08/05/21 [History]


Ibuprofen 200 mg PO Q6H PRN 08/05/21 [History]


Loratadine [Claritin] 10 mg PO DAILY PRN 08/05/21 [History]


Simvastatin 20 mg PO BEDTIME 08/05/21 [History]


Doxycycline [Vibra-Tabs] 100 mg PO Q12HR 10 Days #20 tab 08/08/21 [Rx]


Furosemide [Lasix] 20 mg PO DAILY 30 Days #30 tablet 08/08/21 [Rx]


cephALEXin [Cephalexin] 500 mg PO Q6HR 7 Days #28 tablet 08/08/21 [Rx]


Potassium Chloride 20 meq PO DAILY #30 tab.er.prt 08/09/21 [Rx]








Oxygen Therapy Mode: Nasal Cannula (As per home oxygen assessment.)


Oxygen Flow Rate (L/min): 2


Patient Handouts:  Furosemide Oral Tablets, Potassium Chloride Extended-Release 

Capsules, Doxycycline tablets or capsules, Cellulitis, Adult, Easy-to-Read, 

Cephalexin Tablets or Capsules, Pulmonary Edema, Easy-To-Read


Referrals: 


Leo Guzman MD [Primary Care Provider] - 08/20/21 4:30 pm


Katy Franco MD [Physician] - 08/16/21 2:15 pm





- Discharge Summary/Plan Comment


DC Time >30 min.: Yes





- General Info


Admission Dx/Problem (Free Text: 


                           Admission Diagnosis/Problem





Admission Diagnosis/Problem      Pulmonary edema











- Review of Systems


General: Reports: No Symptoms


HEENT: Reports: No Symptoms


Pulmonary: Denies: Pleuritic Chest Pain, Cough


Cardiovascular: Reports: Dyspnea on Exertion.  Denies: Chest Pain, Palpitations,

 Orthopnea, PND, Edema


Gastrointestinal: Denies: Abdominal Pain, Constipation, Decreased Appetite


Genitourinary: Reports: No Symptoms


Musculoskeletal: Reports: No Symptoms


Skin: Reports: Other (Ulcer)


Neurological: Reports: No Symptoms





- Patient Data


Vitals - Most Recent: 


                                Last Vital Signs











Temp  97.7 F   08/09/21 08:00


 


Pulse  68   08/09/21 08:00


 


Resp  18   08/09/21 08:00


 


BP  117/57 L  08/09/21 08:00


 


Pulse Ox  90 L  08/09/21 08:00











Weight - Most Recent: 147 lb 4.8 oz


I&O - Last 24 hours: 


                                 Intake & Output











 08/08/21 08/09/21 08/09/21





 22:59 06:59 14:59


 


Intake Total 910 815 


 


Output Total 400 835 


 


Balance 510 -20 











Lab Results - Last 24 hrs: 


                         Laboratory Results - last 24 hr











  08/08/21 08/09/21 08/09/21 Range/Units





  17:08 06:00 06:00 


 


WBC   5.09   (4.0-11.0)  K/uL


 


RBC   3.23 L   (4.30-5.90)  M/uL


 


Hgb   9.0 L   (12.0-16.0)  g/dL


 


Hct   27.0 L   (36.0-46.0)  %


 


MCV   83.6   (80.0-98.0)  fL


 


MCH   27.9   (27.0-32.0)  pg


 


MCHC   33.3   (31.0-37.0)  g/dL


 


RDW Std Deviation   51.3   (28.0-62.0)  fl


 


RDW Coeff of Brody   17 H   (11.0-15.0)  %


 


Plt Count   211   (150-400)  K/uL


 


MPV   10.40   (7.40-12.00)  fL


 


Nucleated RBC %   0.0   /100WBC


 


Nucleated RBCs #   0   K/uL


 


Sodium    140  (136-145)  mmol/L


 


Potassium    2.9 L  (3.5-5.1)  mmol/L


 


Chloride    102  ()  mmol/L


 


Carbon Dioxide    30.9  (21.0-32.0)  mmol/L


 


BUN    19 H  (7.0-18.0)  mg/dL


 


Creatinine    0.9  (0.6-1.0)  mg/dL


 


Est Cr Clr Drug Dosing    39.89  mL/min


 


Estimated GFR (MDRD)    59.0  ml/min


 


Glucose    112 H  ()  mg/dL


 


Calcium    9.3  (8.5-10.1)  mg/dL


 


Magnesium     (1.8-2.4)  mg/dL


 


Vancomycin Trough  7.2    (5.0-10.0)  ug/mL














  08/09/21 Range/Units





  06:00 


 


WBC   (4.0-11.0)  K/uL


 


RBC   (4.30-5.90)  M/uL


 


Hgb   (12.0-16.0)  g/dL


 


Hct   (36.0-46.0)  %


 


MCV   (80.0-98.0)  fL


 


MCH   (27.0-32.0)  pg


 


MCHC   (31.0-37.0)  g/dL


 


RDW Std Deviation   (28.0-62.0)  fl


 


RDW Coeff of Brody   (11.0-15.0)  %


 


Plt Count   (150-400)  K/uL


 


MPV   (7.40-12.00)  fL


 


Nucleated RBC %   /100WBC


 


Nucleated RBCs #   K/uL


 


Sodium   (136-145)  mmol/L


 


Potassium   (3.5-5.1)  mmol/L


 


Chloride   ()  mmol/L


 


Carbon Dioxide   (21.0-32.0)  mmol/L


 


BUN   (7.0-18.0)  mg/dL


 


Creatinine   (0.6-1.0)  mg/dL


 


Est Cr Clr Drug Dosing   mL/min


 


Estimated GFR (MDRD)   ml/min


 


Glucose   ()  mg/dL


 


Calcium   (8.5-10.1)  mg/dL


 


Magnesium  1.9  (1.8-2.4)  mg/dL


 


Vancomycin Trough   (5.0-10.0)  ug/mL











Med Orders - Current: 


                               Current Medications





Acetaminophen (Acetaminophen 325 Mg Tab)  650 mg PO Q4H PRN


   PRN Reason: Pain (Mild 1-3)/fever


   Last Admin: 08/08/21 16:39 Dose:  650 mg


   Documented by: 


Albuterol/Ipratropium (Albuterol/Ipratropium 3.0-0.5 Mg/3 Ml Neb Soln)  3 ml NEB

 Q4HRRT PRN


   PRN Reason: sob/wheezing


   Last Admin: 08/06/21 22:50 Dose:  3 ml


   Documented by: 


Carbamide Perox/Anhydrous Glycerin (Carbamide Peroxide 6.5% Otic Soln 15 Ml 

Bottle)  0 ml EARBOTH BID PRN


   PRN Reason: Other


   Last Admin: 08/08/21 16:22 Dose:  1 applic


   Documented by: 


Carbidopa/Levodopa (Carbidopa/Levodopa  Mg Tab)  1.5 tab PO TID [facility]


   Last Admin: 08/09/21 06:53 Dose:  1.5 tab


   Documented by: 


Cholecalciferol (Cholecalciferol (Vitamin D3) 25 Mcg Tab)  25 mcg PO DAILY [facility]


   Last Admin: 08/09/21 08:52 Dose:  25 mcg


   Documented by: 


Cyanocobalamin (Cyanocobalamin (Vitamin B12) 500 Mcg Tab)  1,000 mcg PO DAILY 

[facility]


   Last Admin: 08/09/21 08:52 Dose:  1,000 mcg


   Documented by: 


Docusate Sodium (Docusate Sodium 100 Mg Cap)  100 mg PO DAILY [facility]


   Last Admin: 08/09/21 08:51 Dose:  100 mg


   Documented by: 


Furosemide (Furosemide 20 Mg/2 Ml Vial)  20 mg IVPUSH Q12H [facility]


   Last Admin: 08/09/21 04:52 Dose:  20 mg


   Documented by: 


Gabapentin (Gabapentin 100 Mg Cap)  100 mg PO TID [facility]


   Last Admin: 08/09/21 06:53 Dose:  100 mg


   Documented by: 


Piperacillin Sod/Tazobactam (Sod 3.375 gm/ Sodium Chloride)  100 mls @ 200 

mls/hr IV Q6H [facility]


   Last Admin: 08/09/21 10:50 Dose:  200 mls/hr


   Documented by: 


Vancomycin HCl 1 gm/ Sodium (Chloride)  250 mls @ 166.667 mls/hr IV Q24H [facility]


   Last Admin: 08/08/21 18:28 Dose:  166.667 mls/hr


   Documented by: 


Clindamycin Phosphate 600 mg/ (Premix)  50 mls @ 100 mls/hr IV Q8H [facility]


   Last Admin: 08/09/21 06:54 Dose:  100 mls/hr


   Documented by: 


Potassium Chloride 40 meq/ (Sodium Chloride)  520 mls @ 130 mls/hr IV ONETIME 

ONE


   Stop: 08/09/21 12:14


   Last Admin: 08/09/21 09:03 Dose:  130 mls/hr


   Documented by: 


Lorazepam (Lorazepam 2 Mg/Ml Sdv)  0.5 mg IVPUSH ONETIME PRN


   PRN Reason: Anxiety


   Last Admin: 08/05/21 23:56 Dose:  0.5 mg


   Documented by: 


Multivitamins/Minerals/Vitamin C (Multivitamin Tab)  1 tab PO DAILY [facility]


   Last Admin: 08/09/21 08:52 Dose:  1 tab


   Documented by: 


Pantoprazole Sodium (Pantoprazole 40 Mg Tab.Cr)  40 mg PO ACBREAKFAST [facility]


   Last Admin: 08/09/21 06:53 Dose:  40 mg


   Documented by: 


Polyethylene Glycol (Polyethylene Glycol 3350 Powder 17 Gm Packet)  17 gm PO BID

 [facility]


   Last Admin: 08/09/21 08:52 Dose:  17 gm


   Documented by: 


Sertraline HCl (Sertraline 100 Mg Tab)  100 mg PO DAILY [facility]


   Last Admin: 08/09/21 08:52 Dose:  100 mg


   Documented by: 


Sodium Chloride (Sodium Chloride 0.9% 10 Ml Syringe)  10 ml FLUSH ASDIRECTED PRN


   PRN Reason: Keep Vein Open


   Last Admin: 08/04/21 13:19 Dose:  10 ml


   Documented by: 


Sodium Chloride (Sodium Chloride 0.9% 2.5 Ml Syringe)  2.5 ml FLUSH ASDIRECTED 

PRN


   PRN Reason: Keep Vein Open


   Last Admin: 08/08/21 08:28 Dose:  2.5 ml


   Documented by: 


Vancomycin HCl (Pharmacy To Dose - Vancomycin)  1 dose .XX ASDIRECTED [facility]





Discontinued Medications





Enoxaparin Sodium (Enoxaparin 40 Mg/0.4 Ml Syringe)  40 mg SUBCUT Q24H [facility]


   Last Admin: 08/04/21 22:36 Dose:  40 mg


   Documented by: 


Enoxaparin Sodium (Enoxaparin 30 Mg/0.3 Ml Syringe)  30 mg SUBCUT Q24H [facility]


   Last Admin: 08/06/21 20:38 Dose:  30 mg


   Documented by: 


Furosemide (Furosemide 20 Mg/2 Ml Vial)  20 mg IVPUSH ONETIME ONE


   Stop: 08/04/21 16:54


   Last Admin: 08/04/21 18:27 Dose:  20 mg


   Documented by: 


Furosemide (Furosemide 40 Mg/4 Ml Vial)  40 mg IVPUSH Q12H [facility]


   Last Admin: 08/05/21 17:23 Dose:  40 mg


   Documented by: 


Furosemide (Furosemide 20 Mg/2 Ml Vial)  20 mg IVPUSH NOW ONE


   Stop: 08/06/21 19:07


   Last Admin: 08/06/21 19:48 Dose:  20 mg


   Documented by: 


Clindamycin Phosphate 600 mg/ (Premix)  50 mls @ 100 mls/hr IV ONETIME ONE


   Stop: 08/04/21 12:55


   Last Admin: 08/04/21 13:19 Dose:  100 mls/hr


   Documented by: 


Sodium Chloride (Normal Saline)  500 mls @ 999 mls/hr IV .Bolus ONE


   Stop: 08/04/21 15:17


   Last Admin: 08/04/21 15:13 Dose:  999 mls/hr


   Documented by: 


Azithromycin 500 mg/ Sodium (Chloride)  250 mls @ 250 mls/hr IV ONETIME STA


   Stop: 08/04/21 18:07


   Last Admin: 08/04/21 18:01 Dose:  250 mls/hr


   Documented by: 


Ceftriaxone Sodium/Dextrose 1 (gm/ Premix)  50 mls @ 100 mls/hr IV ONETIME ONE


   Stop: 08/04/21 17:37


   Last Admin: 08/04/21 18:00 Dose:  100 mls/hr


   Documented by: 


Clindamycin Phosphate 400 mg/ (Sodium Chloride)  52.6667 mls @ 100 mls/hr IV Q8H

 [facility]


   Last Admin: 08/04/21 23:30 Dose:  Not Given


   Documented by: 


Furosemide 40 mg/ Sodium (Chloride)  54 mls @ 100 mls/hr IV Q12H [facility]


Clindamycin Phosphate (Cleocin In D5w 600 Mg/50 Ml)  50 mls @ 100 mls/hr IV Q8H 

[facility]


   Last Admin: 08/06/21 14:48 Dose:  92.593 mls/hr


   Documented by: 


Magnesium Sulfate 2 gm/ Premix  50 mls @ 12.5 mls/hr IV ONETIME ONE


   Stop: 08/06/21 13:33


   Last Admin: 08/06/21 10:18 Dose:  12.5 mls/hr


   Documented by: 


Metronidazole 500 mg/ Premix  100 mls @ 100 mls/hr IV QID [facility]


   Last Admin: 08/06/21 11:38 Dose:  100 mls/hr


   Documented by: 


Clindamycin Phosphate 600 mg/ (Premix)  50 mls @ 100 mls/hr IV Q6H [facility]


Iopamidol (Iopamidol 755 Mg/Ml 50 Ml Bottle)  100 ml IV ONETIME STA


   Stop: 08/04/21 14:57


   Last Admin: 08/04/21 15:07 Dose:  100 ml


   Documented by: 


Esomeprazole 20 Mg  1 each PO QAM [facility]


Polyethylene Glycol (Polyethylene Glycol 3350 Powder 17 Gm Packet)  17 gm PO 

BEDTIME PRN


   PRN Reason: Constipation


   Last Admin: 08/06/21 20:38 Dose:  17 gm


   Documented by: 


Polyethylene Glycol (Polyethylene Glycol 3350 Powder 17 Gm Packet)  17 gm PO 

ONETIME ONE


   Stop: 08/07/21 09:27


   Last Admin: 08/07/21 10:17 Dose:  17 gm


   Documented by: 


Potassium Chloride (Potassium Chloride 20 Meq Tab.Er)  40 meq PO ONETIME ONE


   Stop: 08/05/21 08:02


   Last Admin: 08/05/21 08:30 Dose:  40 meq


   Documented by: 


Potassium Chloride (Potassium Chloride 20 Meq Tab.Er)  40 meq PO ONETIME ONE


   Stop: 08/06/21 07:48


   Last Admin: 08/06/21 08:12 Dose:  40 meq


   Documented by: 


Potassium Chloride (Potassium Chloride 20 Meq Tab.Er)  40 meq PO ONETIME ONE


   Stop: 08/07/21 08:16


   Last Admin: 08/07/21 09:16 Dose:  Not Given


   Documented by: 


Potassium Chloride (Potassium Chloride 20 Meq Tab.Er)  40 meq PO BID ONE


   Stop: 08/07/21 08:16


   Last Admin: 08/07/21 09:14 Dose:  40 meq


   Documented by: 


Potassium Chloride (Potassium Chloride 20 Meq Tab.Er)  40 meq PO ONETIME ONE


   Stop: 08/09/21 07:45


   Last Admin: 08/09/21 08:51 Dose:  40 meq


   Documented by: 











- Exam


General: Reports: Alert, Oriented, Cooperative


HEENT: Reports: Pupils Reactive


Neck: Reports: Supple, No JVD


Lungs: Reports: Clear to Auscultation, Decreased Breath Sounds.  Denies: 

Crackles, Wheezing


Cardiovascular: Reports: Regular Rate, No Murmurs


GI/Abdominal Exam: Normal Bowel Sounds, Soft, Non-Tender.  No: Guarding, Rebound


Back Exam: Reports: Normal Inspection.  Denies: CVA Tenderness (L), CVA 

Tenderness (R)


Extremities: No Pedal Edema.  No: Duy's Sign, Leg Pain


Wound/Incisions: Reports: Healing Well (Left groin skin ulceration), Dressing 

Dry and Intact, Drainage.  Denies: Erythema


Neurological: Reports: No New Focal Deficit

## 2021-08-09 NOTE — ECHO
EXAM DATE: 08/04/21



PATIENT'S AGE: 89



The ECHO report has been scanned into [employer] and can be seen in this patient's
EMR (Electronic Medical Record) under the REPORTS section. The report has also 
been scanned into PACS.



DONTRELL

## 2021-10-28 NOTE — EDM.PDOC
ED HPI GENERAL MEDICAL PROBLEM





- General


Chief Complaint: Chest Pain


Stated Complaint: SOB, LOW OXYGEN, BREAST PAIN


Time Seen by Provider: 10/28/21 16:13


Source of Information: Reports: Patient





- History of Present Illness


INITIAL COMMENTS - FREE TEXT/NARRATIVE: 





89-year-old female presents to the emergency department complaining of shortness

of breath.  She has been short of breath for couple months and worse recently.  

She states she has a history of fluid in the lungs.  Patient states she has some

right-sided chest pain prior to arrival but this is since resolved.  This is 

more in her breast.  She denies any cough or fever or productive sputum.  She is

immunized against Covid.  She denies history of blood clots.  No lower extremity

swelling.  No recent travel or injury or cancer or surgery.  Patient denies any 

exacerbating or alleviating factors





- Related Data


                                    Allergies











Allergy/AdvReac Type Severity Reaction Status Date / Time


 


meperidine HCl [From Demerol] Allergy  Vomiting Verified 10/28/21 22:12


 


Penicillins Allergy  Rash Verified 10/28/21 22:12











Home Meds: 


                                    Home Meds





Sertraline HCl [Zoloft] 100 mg PO BID 08/03/18 [History]


Cholecalciferol (Vitamin D3) [Vitamin D3] 1,000 unit PO DAILY 07/17/19 [History]


Cyanocobalamin (Vitamin B12) [Vitamin B12] 1,000 mcg PO DAILY 07/17/19 [History]


Esomeprazole Magnesium [Nexium 24Hr] 20 mg PO QAM 07/17/19 [History]


Multivitamin with Minerals [Hair, Skin and Nails] 1 tab PO DAILY 07/17/19 

[History]


Carbidopa/Levodopa [Carbidopa-Levodopa ] 37.5 - 150 mg PO TID 08/05/21 

[History]


Docusate Sodium 100 mg PO DAILY 08/05/21 [History]


Gabapentin [Neurontin] 100 mg PO TID 08/05/21 [History]


Ibuprofen 200 mg PO Q6H PRN 08/05/21 [History]


Simvastatin 20 mg PO BEDTIME 08/05/21 [History]











Past Medical History


HEENT History: Reports: Hard of Hearing, Other (See Below)


Other HEENT History: wears glasses,  has bilateral hearing aides


Cardiovascular History: Reports: High Cholesterol, Hypertension


Respiratory History: Reports: Sleep Apnea, SOB, Other (See Below)


Other Respiratory History: uses CPAP


Gastrointestinal History: Reports: GERD, Hiatal Hernia, Other (See Below)


Genitourinary History: Reports: Renal Calculus, Urinary Incontinence


OB/GYN History: Reports: Pregnancy


Musculoskeletal History: Reports: Back Pain, Chronic, Fracture, Osteoarthritis


Other Musculoskeletal History: hx of fx wrist


Neurological History: Reports: Neuropathy, Peripheral, Other (See Below)


Psychiatric History: Reports: Anxiety, Depression, Other (See Below)


Endocrine/Metabolic History: Reports: None


Hematologic History: Reports: None


Immunologic History: Reports: None


Oncologic (Cancer) History: Reports: None


Dermatologic History: Reports: Other (See Below)


Other Dermatologic History: dermititis on scalp





- Infectious Disease History


Infectious Disease History: Reports: Chicken Pox, Measles, Mumps





- Past Surgical History


Head Surgeries/Procedures: Reports: None


HEENT Surgical History: Reports: Cataract Surgery, Naso-Sinus Surgery, 

Tonsillectomy


Cardiovascular Surgical History: Reports: None


Respiratory Surgical History: Reports: None


GI Surgical History: Reports: Colonoscopy, Hernia, Inguinal, Other (See Below)


Other GI Surgeries/Procedures: I&D of Perirectal abscess


Female  Surgical History: Reports: Hysterectomy, Salpingo-Oophorectomy


Endocrine Surgical History: Reports: None


Neurological Surgical History: Reports: Lumbar Spine, Spinal Fusion


Other Neurological Surgeries/Procedures: L3-4-5,  has hardware


Musculoskeletal Surgical History: Reports: Arthroscopic Knee


Oncologic Surgical History: Reports: None


Dermatological Surgical History: Reports: None





Social & Family History





- Family History


Family Medical History: No Pertinent Family History





- Tobacco Use


Tobacco Use Status *Q: Former Tobacco User


Used Tobacco, but Quit: Yes


Month/Year Tobacco Last Used: stopped 20 years ago





- Caffeine Use


Caffeine Use: Reports: Coffee, Tea





ED ROS GENERAL





- Review of Systems


Review Of Systems: Comprehensive ROS is negative, except as noted in HPI.





ED EXAM, GENERAL





- Physical Exam


Exam: See Below


Free Text/Narrative:: 





CONSTITUTIONAL: well appearing in no acute distress


SKIN: Warm, dry, and intact without rash


HENT: Normocephalic, atraumatic,


PULMONARY: clear to ausculation bilaterally. No rales, rhonchi, wheezing


CARDIOVASCULAR: regular rate, No murmur, rubs, or gallops


GASTROINTESTINAL: soft, nondistended, nontender


NEUROLOGIC: normal speech, II-XII intact. light touch/5/5 power equal and 

symmetric in upper and lower extremities without deficit


MUSCULOSKELETAL: no gross deformities, atraumatic.  No pitting edema


PSYCHIATRIC: normal mood and affect





Course





- Vital Signs


Text/Narrative:: 





Differential diagnosis: PE, CHF, Covid, infection, pneumonia, other





Presents complaining of shortness of breath.  Patient's imaging studies show 

evidence of some new nodules that could be consistent with infection.  

Furthermore the patient has some degree of leukopenia.  Patient has been 

cultured and empiric antibiotics have been given for the possibility of 

infection.  There is also some evidence of perhaps mild heart failure.  Patient 

will be admitted for continued treatment and management and also she had chest 

pain to rule out ACS


Last Recorded V/S: 


                                Last Vital Signs











Temp  37.2 C   10/29/21 11:50


 


Pulse  87   10/29/21 11:50


 


Resp  20   10/29/21 11:50


 


BP  120/53 L  10/29/21 11:50


 


Pulse Ox  94 L  10/29/21 11:50














- Orders/Labs/Meds


Orders: 


                               Active Orders 24 hr











 Category Date Time Status


 


 Cardiac Monitoring [RC] .AS DIRECTED Care  10/28/21 16:20 Active


 


 Pulse Oximetry [RC] ASDIRECTED Care  10/28/21 16:20 Active


 


 CULTURE BLOOD [BC] Stat Lab  10/28/21 18:23 Received


 


 CULTURE BLOOD [BC] Stat Lab  10/28/21 18:27 Received


 


 Sodium Chloride 0.9% [Saline Flush] Med  10/28/21 16:20 Active





 10 ml FLUSH ASDIRECTED PRN   


 


 Sodium Chloride 0.9% [Saline Flush] Med  10/28/21 16:20 Active





 2.5 ml FLUSH ASDIRECTED PRN   


 


 Blood Culture x2 Reflex Set [OM.PC] Stat Oth  10/28/21 17:46 Ordered


 


 Saline Lock Insert [OM.PC] Stat Oth  10/28/21 16:20 Ordered








                                Medication Orders





Acetaminophen (Acetaminophen 325 Mg Tab)  650 mg PO Q4H PRN


   PRN Reason: Pain (Mild 1-3)/fever


   Last Admin: 10/29/21 12:06  Dose: 650 mg


   Documented by: JAGDISH


Albuterol/Ipratropium (Albuterol/Ipratropium 3.0-0.5 Mg/3 Ml Neb Soln)  3 ml NEB

 Q4HRRT PRN


   PRN Reason: Shortness Of Breath/wheezing


Aspirin (Aspirin 81 Mg Tab.Chew)  162 mg PO DAILY [facility]


   Last Admin: 10/29/21 10:13  Dose: 162 mg


   Documented by: JAGDISH


   Admin: 10/28/21 19:21  Dose: 162 mg


   Documented by: PAVITHRA


Furosemide (Furosemide 20 Mg/2 Ml Vial)  20 mg IVPUSH DAILY [facility]


   Last Admin: 10/29/21 10:14  Dose: 20 mg


   Documented by: JAGDISH


Heparin Sodium (Porcine) (Heparin Sodium 5,000 Units/Ml Vial)  5,000 units 

SUBCUT Q8H [facility]


   Last Admin: 10/29/21 11:36  Dose: 5,000 units


   Documented by: JAGDISH


   Admin: 10/29/21 04:05  Dose: 5,000 units


   Documented by: FERMÍN


   Admin: 10/28/21 20:47  Dose: 5,000 units


   Documented by: KRISTEN


Levofloxacin/Dextrose 750 mg/ (Premix)  150 mls @ 100 mls/hr IV Q24H [facility]


   Last Admin: 10/29/21 10:13  Dose: 100 mls/hr


   Documented by: JAGDISH


Ondansetron HCl (Ondansetron 4 Mg/2 Ml Sdv)  4 mg IVPUSH Q4H PRN


   PRN Reason: Nausea/Vomiting


Pantoprazole Sodium (Pantoprazole 40 Mg Vial)  40 mg IV Q24H [facility]


   Last Admin: 10/28/21 20:47  Dose: 40 mg


   Documented by: KRISTEN


Sertraline HCl (Sertraline 100 Mg Tab)  100 mg PO BID [facility]


Sodium Chloride (Sodium Chloride 0.9% 10 Ml Syringe)  10 ml FLUSH ASDIRECTED PRN


   PRN Reason: Keep Vein Open


   Last Admin: 10/28/21 16:38  Dose: 10 ml


   Documented by: ADY


Sodium Chloride (Sodium Chloride 0.9% 2.5 Ml Syringe)  2.5 ml FLUSH ASDIRECTED 

PRN


   PRN Reason: Keep Vein Open


   Last Admin: 10/28/21 16:38  Dose: 2.5 ml


   Documented by: ADY








Labs: 


                                Laboratory Tests











  10/28/21 10/28/21 10/28/21 Range/Units





  16:00 16:05 16:05 


 


WBC   2.88 L   (4.0-11.0)  K/uL


 


RBC   2.85 L   (4.30-5.90)  M/uL


 


Hgb   8.3 L   (12.0-16.0)  g/dL


 


Hct   24.6 L   (36.0-46.0)  %


 


MCV   86.3   (80.0-98.0)  fL


 


MCH   29.1   (27.0-32.0)  pg


 


MCHC   33.7   (31.0-37.0)  g/dL


 


RDW Std Deviation   59.9   (28.0-62.0)  fl


 


RDW Coeff of Brody   19 H   (11.0-15.0)  %


 


Plt Count   144 L   (150-400)  K/uL


 


MPV   9.80   (7.40-12.00)  fL


 


Neut % (Auto)   69.5   (48.0-80.0)  %


 


Lymph % (Auto)   23.6   (16.0-40.0)  %


 


Mono % (Auto)   5.2   (0.0-15.0)  %


 


Eos % (Auto)   1.7   (0.0-7.0)  %


 


Baso % (Auto)   0.0   (0.0-1.5)  %


 


Neut # (Auto)   2.0   (1.4-5.7)  K/uL


 


Lymph # (Auto)   0.7   (0.6-2.4)  K/uL


 


Mono # (Auto)   0.2   (0.0-0.8)  K/uL


 


Eos # (Auto)   0.1   (0.0-0.7)  K/uL


 


Baso # (Auto)   0.0   (0.0-0.1)  K/uL


 


Nucleated RBC %   0.0   /100WBC


 


Nucleated RBCs #   0   K/uL


 


INR     


 


Sodium    139  (136-145)  mmol/L


 


Potassium    3.5  (3.5-5.1)  mmol/L


 


Chloride    102  ()  mmol/L


 


Carbon Dioxide    28.5  (21.0-32.0)  mmol/L


 


BUN    17  (7.0-18.0)  mg/dL


 


Creatinine    0.8  (0.6-1.0)  mg/dL


 


Est Cr Clr Drug Dosing    TNP  


 


Estimated GFR (MDRD)    > 60.0  ml/min


 


Glucose    106  ()  mg/dL


 


Lactic Acid     (0.4-2.0)  mmol/L


 


Calcium    9.3  (8.5-10.1)  mg/dL


 


Total Bilirubin    1.6 H  (0.2-1.0)  mg/dL


 


AST    14 L  (15-37)  IU/L


 


ALT    8 L  (14-63)  IU/L


 


Alkaline Phosphatase    124 H  ()  U/L


 


Troponin I    < 0.050  (0.000-0.056)  ng/mL


 


B-Natriuretic Peptide     (<100)  PG/ML


 


Total Protein    7.9  (6.4-8.2)  g/dL


 


Albumin    3.4  (3.4-5.0)  g/dL


 


Globulin    4.5 H  (2.6-4.0)  g/dL


 


Albumin/Globulin Ratio    0.8 L  (0.9-1.6)  


 


Urine Color     


 


Urine Appearance     


 


Urine pH     (5.0-8.0)  


 


Ur Specific Gravity     (1.001-1.035)  


 


Urine Protein     (NEGATIVE)  mg/dL


 


Urine Glucose (UA)     (NEGATIVE)  mg/dL


 


Urine Ketones     (NEGATIVE)  mg/dL


 


Urine Occult Blood     (NEGATIVE)  


 


Urine Nitrite     (NEGATIVE)  


 


Urine Bilirubin     (NEGATIVE)  


 


Urine Ictotest     


 


Urine Urobilinogen     (<2.0)  EU/dL


 


Ur Leukocyte Esterase     (NEGATIVE)  


 


Urine RBC     (0-2/HPF)  


 


Urine WBC     (0-5/HPF)  


 


Ur Epithelial Cells     (NONE-FEW)  


 


Calcium Oxalate Crystal     (NEGATIVE)  


 


Urine Bacteria     (NEGATIVE)  


 


Urine Mucus     (NONE-MOD)  


 


SARS-CoV-2 RNA (NOHEMI)  NEGATIVE    (NEGATIVE)  














  10/28/21 10/28/21 10/28/21 Range/Units





  16:05 16:05 16:53 


 


WBC     (4.0-11.0)  K/uL


 


RBC     (4.30-5.90)  M/uL


 


Hgb     (12.0-16.0)  g/dL


 


Hct     (36.0-46.0)  %


 


MCV     (80.0-98.0)  fL


 


MCH     (27.0-32.0)  pg


 


MCHC     (31.0-37.0)  g/dL


 


RDW Std Deviation     (28.0-62.0)  fl


 


RDW Coeff of Brody     (11.0-15.0)  %


 


Plt Count     (150-400)  K/uL


 


MPV     (7.40-12.00)  fL


 


Neut % (Auto)     (48.0-80.0)  %


 


Lymph % (Auto)     (16.0-40.0)  %


 


Mono % (Auto)     (0.0-15.0)  %


 


Eos % (Auto)     (0.0-7.0)  %


 


Baso % (Auto)     (0.0-1.5)  %


 


Neut # (Auto)     (1.4-5.7)  K/uL


 


Lymph # (Auto)     (0.6-2.4)  K/uL


 


Mono # (Auto)     (0.0-0.8)  K/uL


 


Eos # (Auto)     (0.0-0.7)  K/uL


 


Baso # (Auto)     (0.0-0.1)  K/uL


 


Nucleated RBC %     /100WBC


 


Nucleated RBCs #     K/uL


 


INR  1.11    


 


Sodium     (136-145)  mmol/L


 


Potassium     (3.5-5.1)  mmol/L


 


Chloride     ()  mmol/L


 


Carbon Dioxide     (21.0-32.0)  mmol/L


 


BUN     (7.0-18.0)  mg/dL


 


Creatinine     (0.6-1.0)  mg/dL


 


Est Cr Clr Drug Dosing     


 


Estimated GFR (MDRD)     ml/min


 


Glucose     ()  mg/dL


 


Lactic Acid     (0.4-2.0)  mmol/L


 


Calcium     (8.5-10.1)  mg/dL


 


Total Bilirubin     (0.2-1.0)  mg/dL


 


AST     (15-37)  IU/L


 


ALT     (14-63)  IU/L


 


Alkaline Phosphatase     ()  U/L


 


Troponin I     (0.000-0.056)  ng/mL


 


B-Natriuretic Peptide   258 H   (<100)  PG/ML


 


Total Protein     (6.4-8.2)  g/dL


 


Albumin     (3.4-5.0)  g/dL


 


Globulin     (2.6-4.0)  g/dL


 


Albumin/Globulin Ratio     (0.9-1.6)  


 


Urine Color    YELLOW  


 


Urine Appearance    SLT CLOUDY  


 


Urine pH    6.0  (5.0-8.0)  


 


Ur Specific Gravity    >= 1.030  (1.001-1.035)  


 


Urine Protein    TRACE H  (NEGATIVE)  mg/dL


 


Urine Glucose (UA)    NEGATIVE  (NEGATIVE)  mg/dL


 


Urine Ketones    TRACE H  (NEGATIVE)  mg/dL


 


Urine Occult Blood    NEGATIVE  (NEGATIVE)  


 


Urine Nitrite    POSITIVE H  (NEGATIVE)  


 


Urine Bilirubin    SMALL H  (NEGATIVE)  


 


Urine Ictotest    NEGATIVE  


 


Urine Urobilinogen    2.0 H  (<2.0)  EU/dL


 


Ur Leukocyte Esterase    SMALL H  (NEGATIVE)  


 


Urine RBC    NONE SEEN  (0-2/HPF)  


 


Urine WBC    5-10  (0-5/HPF)  


 


Ur Epithelial Cells    FEW  (NONE-FEW)  


 


Calcium Oxalate Crystal    FEW  (NEGATIVE)  


 


Urine Bacteria    FEW  (NEGATIVE)  


 


Urine Mucus    MODERATE  (NONE-MOD)  


 


SARS-CoV-2 RNA (NOHEMI)     (NEGATIVE)  














  10/28/21 Range/Units





  18:27 


 


WBC   (4.0-11.0)  K/uL


 


RBC   (4.30-5.90)  M/uL


 


Hgb   (12.0-16.0)  g/dL


 


Hct   (36.0-46.0)  %


 


MCV   (80.0-98.0)  fL


 


MCH   (27.0-32.0)  pg


 


MCHC   (31.0-37.0)  g/dL


 


RDW Std Deviation   (28.0-62.0)  fl


 


RDW Coeff of Brody   (11.0-15.0)  %


 


Plt Count   (150-400)  K/uL


 


MPV   (7.40-12.00)  fL


 


Neut % (Auto)   (48.0-80.0)  %


 


Lymph % (Auto)   (16.0-40.0)  %


 


Mono % (Auto)   (0.0-15.0)  %


 


Eos % (Auto)   (0.0-7.0)  %


 


Baso % (Auto)   (0.0-1.5)  %


 


Neut # (Auto)   (1.4-5.7)  K/uL


 


Lymph # (Auto)   (0.6-2.4)  K/uL


 


Mono # (Auto)   (0.0-0.8)  K/uL


 


Eos # (Auto)   (0.0-0.7)  K/uL


 


Baso # (Auto)   (0.0-0.1)  K/uL


 


Nucleated RBC %   /100WBC


 


Nucleated RBCs #   K/uL


 


INR   


 


Sodium   (136-145)  mmol/L


 


Potassium   (3.5-5.1)  mmol/L


 


Chloride   ()  mmol/L


 


Carbon Dioxide   (21.0-32.0)  mmol/L


 


BUN   (7.0-18.0)  mg/dL


 


Creatinine   (0.6-1.0)  mg/dL


 


Est Cr Clr Drug Dosing   


 


Estimated GFR (MDRD)   ml/min


 


Glucose   ()  mg/dL


 


Lactic Acid  0.5  (0.4-2.0)  mmol/L


 


Calcium   (8.5-10.1)  mg/dL


 


Total Bilirubin   (0.2-1.0)  mg/dL


 


AST   (15-37)  IU/L


 


ALT   (14-63)  IU/L


 


Alkaline Phosphatase   ()  U/L


 


Troponin I   (0.000-0.056)  ng/mL


 


B-Natriuretic Peptide   (<100)  PG/ML


 


Total Protein   (6.4-8.2)  g/dL


 


Albumin   (3.4-5.0)  g/dL


 


Globulin   (2.6-4.0)  g/dL


 


Albumin/Globulin Ratio   (0.9-1.6)  


 


Urine Color   


 


Urine Appearance   


 


Urine pH   (5.0-8.0)  


 


Ur Specific Gravity   (1.001-1.035)  


 


Urine Protein   (NEGATIVE)  mg/dL


 


Urine Glucose (UA)   (NEGATIVE)  mg/dL


 


Urine Ketones   (NEGATIVE)  mg/dL


 


Urine Occult Blood   (NEGATIVE)  


 


Urine Nitrite   (NEGATIVE)  


 


Urine Bilirubin   (NEGATIVE)  


 


Urine Ictotest   


 


Urine Urobilinogen   (<2.0)  EU/dL


 


Ur Leukocyte Esterase   (NEGATIVE)  


 


Urine RBC   (0-2/HPF)  


 


Urine WBC   (0-5/HPF)  


 


Ur Epithelial Cells   (NONE-FEW)  


 


Calcium Oxalate Crystal   (NEGATIVE)  


 


Urine Bacteria   (NEGATIVE)  


 


Urine Mucus   (NONE-MOD)  


 


SARS-CoV-2 RNA (NOHEMI)   (NEGATIVE)  











Meds: 


Medications











Generic Name Dose Route Start Last Admin





  Trade Name Freq  PRN Reason Stop Dose Admin


 


Acetaminophen  650 mg  10/28/21 20:30  10/29/21 12:06





  Acetaminophen 325 Mg Tab  PO   650 mg





  Q4H PRN   Administration





  Pain (Mild 1-3)/fever  


 


Albuterol/Ipratropium  3 ml  10/28/21 20:30 





  Albuterol/Ipratropium 3.0-0.5 Mg/3 Ml Neb Soln  NEB  





  Q4HRRT PRN  





  Shortness Of Breath/wheezing  


 


Aspirin  162 mg  10/28/21 19:00  10/29/21 10:13





  Aspirin 81 Mg Tab.Chew  PO   162 mg





  DAILY CHRIS   Administration


 


Furosemide  20 mg  10/29/21 09:00  10/29/21 10:14





  Furosemide 20 Mg/2 Ml Vial  IVPUSH   20 mg





  DAILY CHRIS   Administration


 


Heparin Sodium (Porcine)  5,000 units  10/28/21 20:30  10/29/21 11:36





  Heparin Sodium 5,000 Units/Ml Vial  SUBCUT   5,000 units





  Q8H [facility]   Administration


 


Levofloxacin/Dextrose 750 mg/  150 mls @ 100 mls/hr  10/29/21 09:00  10/29/21 

10:13





  Premix  IV   100 mls/hr





  Q24H [facility]   Administration


 


Ondansetron HCl  4 mg  10/28/21 20:30 





  Ondansetron 4 Mg/2 Ml Sdv  IVPUSH  





  Q4H PRN  





  Nausea/Vomiting  


 


Pantoprazole Sodium  40 mg  10/28/21 20:30  10/28/21 20:47





  Pantoprazole 40 Mg Vial  IV   40 mg





  Q24H [facility]   Administration


 


Sertraline HCl  100 mg  10/29/21 13:30 





  Sertraline 100 Mg Tab  PO  





  BID CHRIS  


 


Sodium Chloride  10 ml  10/28/21 16:20  10/28/21 16:38





  Sodium Chloride 0.9% 10 Ml Syringe  FLUSH   10 ml





  ASDIRECTED PRN   Administration





  Keep Vein Open  


 


Sodium Chloride  2.5 ml  10/28/21 16:20  10/28/21 16:38





  Sodium Chloride 0.9% 2.5 Ml Syringe  FLUSH   2.5 ml





  ASDIRECTED PRN   Administration





  Keep Vein Open  














Discontinued Medications














Generic Name Dose Route Start Last Admin





  Trade Name Freq  PRN Reason Stop Dose Admin


 


Furosemide  Confirm  10/28/21 19:12  10/28/21 19:20





  Furosemide 40 Mg/4 Ml Vial  Administered  10/28/21 19:13  Not Given





  Dose  





  40 mg  





  .ROUTE  





  .STK-MED ONE  


 


Furosemide  20 mg  10/28/21 19:18  10/28/21 19:22





  Furosemide 40 Mg/4 Ml Vial  IVPUSH  10/28/21 19:19  40 mg





  NOW ONE   Administration


 


Piperacillin Sod/Tazobactam  50 mls @ 100 mls/hr  10/28/21 17:44  10/28/21 18:22





  Sod 3.375 gm/ Sodium Chloride  IV  10/28/21 18:13  100 mls/hr





  ONETIME ONE   Administration


 


Vancomycin HCl 1.25 gm/ Premix  250 mls @ 166.667 mls/hr  10/28/21 18:30  

10/28/21 18:22





  IV  10/28/21 19:59  166.667 mls/hr





  ONETIME ONE   Administration


 


Furosemide 20 mg/ Sodium  52 mls @ 100 mls/hr  10/28/21 19:01  10/28/21 19:14





  Chloride  IV  10/28/21 19:32  Not Given





  NOW STA  


 


Iopamidol  75 ml  10/28/21 19:00  10/28/21 19:01





  Iopamidol 755 Mg/Ml 500 Ml Multipack Bottle  IVPUSH  10/28/21 19:01  75 ml





  ONETIME STA   Administration


 


Vancomycin HCl  1 dose  10/28/21 17:45  10/28/21 18:23





  Pharmacy To Dose - Vancomycin  .XX  10/28/21 17:46  Not Given





  ONETIME ONE  














Departure





- Departure


Time of Disposition: 18:00


Disposition: Admitted As Inpatient 66


Condition: Good


Clinical Impression: 


 Acute dyspnea, Leukopenia, Chest pain








- Discharge Information





Sepsis Event Note (ED)





- Evaluation


Sepsis Screening Result: No Definite Risk





- My Orders


Last 24 Hours: 


My Active Orders





10/28/21 16:20


Cardiac Monitoring [RC] .AS DIRECTED 


Pulse Oximetry [RC] ASDIRECTED 


Sodium Chloride 0.9% [Saline Flush]   10 ml FLUSH ASDIRECTED PRN 


Sodium Chloride 0.9% [Saline Flush]   2.5 ml FLUSH ASDIRECTED PRN 


Saline Lock Insert [OM.PC] Stat 





10/28/21 17:46


Blood Culture x2 Reflex Set [OM.PC] Stat 





10/28/21 18:23


CULTURE BLOOD [BC] Stat 





10/28/21 18:27


CULTURE BLOOD [BC] Stat 














- Assessment/Plan


Last 24 Hours: 


My Active Orders





10/28/21 16:20


Cardiac Monitoring [RC] .AS DIRECTED 


Pulse Oximetry [RC] ASDIRECTED 


Sodium Chloride 0.9% [Saline Flush]   10 ml FLUSH ASDIRECTED PRN 


Sodium Chloride 0.9% [Saline Flush]   2.5 ml FLUSH ASDIRECTED PRN 


Saline Lock Insert [OM.PC] Stat 





10/28/21 17:46


Blood Culture x2 Reflex Set [OM.PC] Stat 





10/28/21 18:23


CULTURE BLOOD [BC] Stat 





10/28/21 18:27


CULTURE BLOOD [BC] Stat

## 2021-10-28 NOTE — PCM.HP.2
H&P History of Present Illness





- General


Date of Service: 10/28/21


Admit Problem/Dx: 


                           Admission Diagnosis/Problem





Admission Diagnosis/Problem      CHF, Congestive heart failure











- History of Present Illness


Initial Comments - Free Text/Narative: 


89-year-old female with past medical history of CHF, hypertension, hyperlipi

demia presents to the emergency department complaining of shortness of breath.  

She has been short of breath for couple months and worse recently. Patient 

states she has some right-sided chest pain prior to arrival but this is since 

resolved.  She denies any cough or fever or productive sputum.  She is immunized

against Covid.  She denies history of blood clots.  No lower extremity swelling.

 No recent travel or injury or cancer or surgery.  Patient was admitted in 

August for volume overload and treated with IV Lasix at that time she also had a

wound infection in her groin area which was successfully treated and followed by

surgery.  In the ER chest x-ray was performed which showed nodular opacities and

CT scan was recommended.  CT scan of the chest showed no pulmonary embolism or 

lobar pneumonia.  New bilateral pulmonary nodules which may represent infection 

or metastatic disease, mild pulmonary venous congestion with small bilateral 

pleural effusions, right hilar adenopathy and small pericardial effusion, there 

is also a new 1.3 cm left lower lobe pulmonary mass..  Patient was made aware of

the findings and recommended to follow-up with her PCP for work-up of the new 

lung mass.  For now we will be admitting patient for possible underlying 

bacterial pneumonia.  Patient was started on IV antibiotics in the ER as well as

received IV Lasix.  Recent echo was done in August which showed normal EF.  

Patient is being admitted for management of pneumonia.





- Related Data


Allergies/Adverse Reactions: 


                                    Allergies











Allergy/AdvReac Type Severity Reaction Status Date / Time


 


meperidine HCl [From Demerol] Allergy  Vomiting Verified 10/28/21 16:07


 


Penicillins Allergy  Rash Verified 10/28/21 16:07











Home Medications: 


                                    Home Meds





Sertraline HCl [Zoloft] 100 mg PO BID 08/03/18 [History]


Cholecalciferol (Vitamin D3) [Vitamin D3] 1,000 unit PO DAILY 07/17/19 [History]


Cyanocobalamin (Vitamin B12) [Vitamin B12] 1,000 mcg PO DAILY 07/17/19 [History]


Esomeprazole Magnesium [Nexium 24Hr] 20 mg PO QAM 07/17/19 [History]


Multivitamin with Minerals [Hair, Skin and Nails] 1 tab PO DAILY 07/17/19 

[History]


Carbidopa/Levodopa [Carbidopa-Levodopa ] 37.5 - 150 mg PO TID 08/05/21 

[History]


Docusate Sodium 100 mg PO DAILY 08/05/21 [History]


Finasteride 1 mg PO DAILY 08/05/21 [History]


Gabapentin [Neurontin] 100 mg PO TID 08/05/21 [History]


Ibuprofen 200 mg PO Q6H PRN 08/05/21 [History]


Loratadine [Claritin] 10 mg PO DAILY PRN 08/05/21 [History]


Simvastatin 20 mg PO BEDTIME 08/05/21 [History]


Doxycycline [Vibra-Tabs] 100 mg PO Q12HR 10 Days #20 tab 08/08/21 [Rx]


Furosemide [Lasix] 20 mg PO DAILY 30 Days #30 tablet 08/08/21 [Rx]


cephALEXin [Cephalexin] 500 mg PO Q6HR 7 Days #28 tablet 08/08/21 [Rx]


Potassium Chloride 20 meq PO DAILY #30 tab.er.prt 08/09/21 [Rx]











Past Medical History


HEENT History: Reports: Hard of Hearing, Other (See Below)


Other HEENT History: wears glasses,  has bilateral hearing aides


Cardiovascular History: Reports: High Cholesterol, Hypertension


Respiratory History: Reports: Sleep Apnea, SOB, Other (See Below)


Other Respiratory History: uses CPAP


Gastrointestinal History: Reports: GERD, Hiatal Hernia, Other (See Below)


Genitourinary History: Reports: Renal Calculus, Urinary Incontinence


OB/GYN History: Reports: Pregnancy


Musculoskeletal History: Reports: Back Pain, Chronic, Fracture, Osteoarthritis


Other Musculoskeletal History: hx of fx wrist


Neurological History: Reports: Neuropathy, Peripheral, Other (See Below)


Psychiatric History: Reports: Anxiety, Depression, Other (See Below)


Endocrine/Metabolic History: Reports: None


Hematologic History: Reports: None


Immunologic History: Reports: None


Oncologic (Cancer) History: Reports: None


Dermatologic History: Reports: Other (See Below)


Other Dermatologic History: dermititis on scalp





- Infectious Disease History


Infectious Disease History: Reports: Chicken Pox, Measles, Mumps





- Past Surgical History


Head Surgeries/Procedures: Reports: None


HEENT Surgical History: Reports: Cataract Surgery, Naso-Sinus Surgery, 

Tonsillectomy


Cardiovascular Surgical History: Reports: None


Respiratory Surgical History: Reports: None


GI Surgical History: Reports: Colonoscopy, Hernia, Inguinal, Other (See Below)


Other GI Surgeries/Procedures: I&D of Perirectal abscess


Female  Surgical History: Reports: Hysterectomy, Salpingo-Oophorectomy


Endocrine Surgical History: Reports: None


Neurological Surgical History: Reports: Lumbar Spine, Spinal Fusion


Other Neurological Surgeries/Procedures: L3-4-5,  has hardware


Musculoskeletal Surgical History: Reports: Arthroscopic Knee


Oncologic Surgical History: Reports: None


Dermatological Surgical History: Reports: None





Social & Family History





- Family History


Family Medical History: No Pertinent Family History





- Tobacco Use


Tobacco Use Status *Q: Former Tobacco User


Used Tobacco, but Quit: Yes


Month/Year Tobacco Last Used: stopped 20 years ago





- Caffeine Use


Caffeine Use: Reports: Coffee, Tea





H&P Review of Systems





- Review of Systems:


Review Of Systems: See Below


General: Denies: Fever, Chills, Malaise, Weakness


Pulmonary: Reports: Shortness of Breath.  Denies: Wheezing, Pleuritic Chest 

Pain, Cough, Sputum


Cardiovascular: Reports: Dyspnea on Exertion.  Denies: Chest Pain, Palpitations,

 Edema, Lightheadedness


Gastrointestinal: Denies: Abdominal Pain, Anorexia, Black Stool, Hematemesis, 

Nausea, Stool Incontinence


Genitourinary: Denies: Dysuria, Frequency


Musculoskeletal: Denies: Neck Pain, Shoulder Pain, Arm Pain


Skin: Denies: Cyanosis, Jaundice, Mottled


Psychiatric: Denies: Confusion, Depression, Mood Lability





Exam





- Exam


Exam: See Below





- Vital Signs


Vital Signs: 


                                Last Vital Signs











Temp      


 


Pulse  76   10/28/21 19:25


 


Resp  18   10/28/21 19:25


 


BP  113/61   10/28/21 19:25


 


Pulse Ox  97   10/28/21 19:25











Weight: 64.3 kg





- Exam


Quality Assessment: Supplemental Oxygen


General: Alert, Oriented


Neck: Supple, Trachea Midline


Lungs: Normal Respiratory Effort, Crackles, Rales


Cardiovascular: Regular Rate, Regular Rhythm, Normal S1, Normal S2


GI/Abdominal Exam: Normal Bowel Sounds, Soft, Non-Tender





- Patient Data


Lab Results Last 24 hrs: 


                         Laboratory Results - last 24 hr











  10/28/21 10/28/21 10/28/21 Range/Units





  16:00 16:05 16:05 


 


WBC   2.88 L   (4.0-11.0)  K/uL


 


RBC   2.85 L   (4.30-5.90)  M/uL


 


Hgb   8.3 L   (12.0-16.0)  g/dL


 


Hct   24.6 L   (36.0-46.0)  %


 


MCV   86.3   (80.0-98.0)  fL


 


MCH   29.1   (27.0-32.0)  pg


 


MCHC   33.7   (31.0-37.0)  g/dL


 


RDW Std Deviation   59.9   (28.0-62.0)  fl


 


RDW Coeff of Brody   19 H   (11.0-15.0)  %


 


Plt Count   144 L   (150-400)  K/uL


 


MPV   9.80   (7.40-12.00)  fL


 


Neut % (Auto)   69.5   (48.0-80.0)  %


 


Lymph % (Auto)   23.6   (16.0-40.0)  %


 


Mono % (Auto)   5.2   (0.0-15.0)  %


 


Eos % (Auto)   1.7   (0.0-7.0)  %


 


Baso % (Auto)   0.0   (0.0-1.5)  %


 


Neut # (Auto)   2.0   (1.4-5.7)  K/uL


 


Lymph # (Auto)   0.7   (0.6-2.4)  K/uL


 


Mono # (Auto)   0.2   (0.0-0.8)  K/uL


 


Eos # (Auto)   0.1   (0.0-0.7)  K/uL


 


Baso # (Auto)   0.0   (0.0-0.1)  K/uL


 


Nucleated RBC %   0.0   /100WBC


 


Nucleated RBCs #   0   K/uL


 


INR     


 


Sodium    139  (136-145)  mmol/L


 


Potassium    3.5  (3.5-5.1)  mmol/L


 


Chloride    102  ()  mmol/L


 


Carbon Dioxide    28.5  (21.0-32.0)  mmol/L


 


BUN    17  (7.0-18.0)  mg/dL


 


Creatinine    0.8  (0.6-1.0)  mg/dL


 


Est Cr Clr Drug Dosing    TNP  


 


Estimated GFR (MDRD)    > 60.0  ml/min


 


Glucose    106  ()  mg/dL


 


Lactic Acid     (0.4-2.0)  mmol/L


 


Calcium    9.3  (8.5-10.1)  mg/dL


 


Total Bilirubin    1.6 H  (0.2-1.0)  mg/dL


 


AST    14 L  (15-37)  IU/L


 


ALT    8 L  (14-63)  IU/L


 


Alkaline Phosphatase    124 H  ()  U/L


 


Troponin I    < 0.050  (0.000-0.056)  ng/mL


 


B-Natriuretic Peptide     (<100)  PG/ML


 


Total Protein    7.9  (6.4-8.2)  g/dL


 


Albumin    3.4  (3.4-5.0)  g/dL


 


Globulin    4.5 H  (2.6-4.0)  g/dL


 


Albumin/Globulin Ratio    0.8 L  (0.9-1.6)  


 


Urine Color     


 


Urine Appearance     


 


Urine pH     (5.0-8.0)  


 


Ur Specific Gravity     (1.001-1.035)  


 


Urine Protein     (NEGATIVE)  mg/dL


 


Urine Glucose (UA)     (NEGATIVE)  mg/dL


 


Urine Ketones     (NEGATIVE)  mg/dL


 


Urine Occult Blood     (NEGATIVE)  


 


Urine Nitrite     (NEGATIVE)  


 


Urine Bilirubin     (NEGATIVE)  


 


Urine Ictotest     


 


Urine Urobilinogen     (<2.0)  EU/dL


 


Ur Leukocyte Esterase     (NEGATIVE)  


 


Urine RBC     (0-2/HPF)  


 


Urine WBC     (0-5/HPF)  


 


Ur Epithelial Cells     (NONE-FEW)  


 


Calcium Oxalate Crystal     (NEGATIVE)  


 


Urine Bacteria     (NEGATIVE)  


 


Urine Mucus     (NONE-MOD)  


 


SARS-CoV-2 RNA (NOHEMI)  NEGATIVE    (NEGATIVE)  














  10/28/21 10/28/21 10/28/21 Range/Units





  16:05 16:05 16:53 


 


WBC     (4.0-11.0)  K/uL


 


RBC     (4.30-5.90)  M/uL


 


Hgb     (12.0-16.0)  g/dL


 


Hct     (36.0-46.0)  %


 


MCV     (80.0-98.0)  fL


 


MCH     (27.0-32.0)  pg


 


MCHC     (31.0-37.0)  g/dL


 


RDW Std Deviation     (28.0-62.0)  fl


 


RDW Coeff of Brody     (11.0-15.0)  %


 


Plt Count     (150-400)  K/uL


 


MPV     (7.40-12.00)  fL


 


Neut % (Auto)     (48.0-80.0)  %


 


Lymph % (Auto)     (16.0-40.0)  %


 


Mono % (Auto)     (0.0-15.0)  %


 


Eos % (Auto)     (0.0-7.0)  %


 


Baso % (Auto)     (0.0-1.5)  %


 


Neut # (Auto)     (1.4-5.7)  K/uL


 


Lymph # (Auto)     (0.6-2.4)  K/uL


 


Mono # (Auto)     (0.0-0.8)  K/uL


 


Eos # (Auto)     (0.0-0.7)  K/uL


 


Baso # (Auto)     (0.0-0.1)  K/uL


 


Nucleated RBC %     /100WBC


 


Nucleated RBCs #     K/uL


 


INR  1.11    


 


Sodium     (136-145)  mmol/L


 


Potassium     (3.5-5.1)  mmol/L


 


Chloride     ()  mmol/L


 


Carbon Dioxide     (21.0-32.0)  mmol/L


 


BUN     (7.0-18.0)  mg/dL


 


Creatinine     (0.6-1.0)  mg/dL


 


Est Cr Clr Drug Dosing     


 


Estimated GFR (MDRD)     ml/min


 


Glucose     ()  mg/dL


 


Lactic Acid     (0.4-2.0)  mmol/L


 


Calcium     (8.5-10.1)  mg/dL


 


Total Bilirubin     (0.2-1.0)  mg/dL


 


AST     (15-37)  IU/L


 


ALT     (14-63)  IU/L


 


Alkaline Phosphatase     ()  U/L


 


Troponin I     (0.000-0.056)  ng/mL


 


B-Natriuretic Peptide   258 H   (<100)  PG/ML


 


Total Protein     (6.4-8.2)  g/dL


 


Albumin     (3.4-5.0)  g/dL


 


Globulin     (2.6-4.0)  g/dL


 


Albumin/Globulin Ratio     (0.9-1.6)  


 


Urine Color    YELLOW  


 


Urine Appearance    SLT CLOUDY  


 


Urine pH    6.0  (5.0-8.0)  


 


Ur Specific Gravity    >= 1.030  (1.001-1.035)  


 


Urine Protein    TRACE H  (NEGATIVE)  mg/dL


 


Urine Glucose (UA)    NEGATIVE  (NEGATIVE)  mg/dL


 


Urine Ketones    TRACE H  (NEGATIVE)  mg/dL


 


Urine Occult Blood    NEGATIVE  (NEGATIVE)  


 


Urine Nitrite    POSITIVE H  (NEGATIVE)  


 


Urine Bilirubin    SMALL H  (NEGATIVE)  


 


Urine Ictotest    NEGATIVE  


 


Urine Urobilinogen    2.0 H  (<2.0)  EU/dL


 


Ur Leukocyte Esterase    SMALL H  (NEGATIVE)  


 


Urine RBC    NONE SEEN  (0-2/HPF)  


 


Urine WBC    5-10  (0-5/HPF)  


 


Ur Epithelial Cells    FEW  (NONE-FEW)  


 


Calcium Oxalate Crystal    FEW  (NEGATIVE)  


 


Urine Bacteria    FEW  (NEGATIVE)  


 


Urine Mucus    MODERATE  (NONE-MOD)  


 


SARS-CoV-2 RNA (NOHEMI)     (NEGATIVE)  














  10/28/21 Range/Units





  18:27 


 


WBC   (4.0-11.0)  K/uL


 


RBC   (4.30-5.90)  M/uL


 


Hgb   (12.0-16.0)  g/dL


 


Hct   (36.0-46.0)  %


 


MCV   (80.0-98.0)  fL


 


MCH   (27.0-32.0)  pg


 


MCHC   (31.0-37.0)  g/dL


 


RDW Std Deviation   (28.0-62.0)  fl


 


RDW Coeff of Brody   (11.0-15.0)  %


 


Plt Count   (150-400)  K/uL


 


MPV   (7.40-12.00)  fL


 


Neut % (Auto)   (48.0-80.0)  %


 


Lymph % (Auto)   (16.0-40.0)  %


 


Mono % (Auto)   (0.0-15.0)  %


 


Eos % (Auto)   (0.0-7.0)  %


 


Baso % (Auto)   (0.0-1.5)  %


 


Neut # (Auto)   (1.4-5.7)  K/uL


 


Lymph # (Auto)   (0.6-2.4)  K/uL


 


Mono # (Auto)   (0.0-0.8)  K/uL


 


Eos # (Auto)   (0.0-0.7)  K/uL


 


Baso # (Auto)   (0.0-0.1)  K/uL


 


Nucleated RBC %   /100WBC


 


Nucleated RBCs #   K/uL


 


INR   


 


Sodium   (136-145)  mmol/L


 


Potassium   (3.5-5.1)  mmol/L


 


Chloride   ()  mmol/L


 


Carbon Dioxide   (21.0-32.0)  mmol/L


 


BUN   (7.0-18.0)  mg/dL


 


Creatinine   (0.6-1.0)  mg/dL


 


Est Cr Clr Drug Dosing   


 


Estimated GFR (MDRD)   ml/min


 


Glucose   ()  mg/dL


 


Lactic Acid  0.5  (0.4-2.0)  mmol/L


 


Calcium   (8.5-10.1)  mg/dL


 


Total Bilirubin   (0.2-1.0)  mg/dL


 


AST   (15-37)  IU/L


 


ALT   (14-63)  IU/L


 


Alkaline Phosphatase   ()  U/L


 


Troponin I   (0.000-0.056)  ng/mL


 


B-Natriuretic Peptide   (<100)  PG/ML


 


Total Protein   (6.4-8.2)  g/dL


 


Albumin   (3.4-5.0)  g/dL


 


Globulin   (2.6-4.0)  g/dL


 


Albumin/Globulin Ratio   (0.9-1.6)  


 


Urine Color   


 


Urine Appearance   


 


Urine pH   (5.0-8.0)  


 


Ur Specific Gravity   (1.001-1.035)  


 


Urine Protein   (NEGATIVE)  mg/dL


 


Urine Glucose (UA)   (NEGATIVE)  mg/dL


 


Urine Ketones   (NEGATIVE)  mg/dL


 


Urine Occult Blood   (NEGATIVE)  


 


Urine Nitrite   (NEGATIVE)  


 


Urine Bilirubin   (NEGATIVE)  


 


Urine Ictotest   


 


Urine Urobilinogen   (<2.0)  EU/dL


 


Ur Leukocyte Esterase   (NEGATIVE)  


 


Urine RBC   (0-2/HPF)  


 


Urine WBC   (0-5/HPF)  


 


Ur Epithelial Cells   (NONE-FEW)  


 


Calcium Oxalate Crystal   (NEGATIVE)  


 


Urine Bacteria   (NEGATIVE)  


 


Urine Mucus   (NONE-MOD)  


 


SARS-CoV-2 RNA (NOHEMI)   (NEGATIVE)  











Result Diagrams: 


                                 10/28/21 16:05





                                 10/28/21 16:05





Sepsis Event Note





- Evaluation


Sepsis Screening Result: No Definite Risk





- Focused Exam


Vital Signs: 


                                   Vital Signs











  Pulse Resp BP Pulse Ox


 


 10/28/21 19:25  76  18  113/61  97


 


 10/28/21 18:50  75   104/54 L  98


 


 10/28/21 18:41  74    98


 


 10/28/21 18:14  79   115/57 L  95


 


 10/28/21 17:32  81    93 L


 


 10/28/21 17:02  74   114/45 L  93 L


 


 10/28/21 16:31  78   112/48 L  96


 


 10/28/21 16:04  92   123/55 L  91 L














- Problem List


(1) Lung nodules


SNOMED Code(s): 333806195


   ICD Code: R91.8 - OTHER NONSPECIFIC ABNORMAL FINDING OF LUNG FIELD   Status: 

Acute   Current Visit: Yes   





(2) Mass of left lung


SNOMED Code(s): 895994326


   ICD Code: R91.8 - OTHER NONSPECIFIC ABNORMAL FINDING OF LUNG FIELD   Status: 

Acute   Current Visit: Yes   





(3) Hypertension


SNOMED Code(s): 16603609


   ICD Code: I10 - ESSENTIAL (PRIMARY) HYPERTENSION   Status: Acute   Current 

Visit: Yes   





(4) Hyperlipidemia


SNOMED Code(s): 24435267


   ICD Code: E78.5 - HYPERLIPIDEMIA, UNSPECIFIED   Status: Acute   Current 

Visit: Yes   





(5) ASHLEY (obstructive sleep apnea)


SNOMED Code(s): 06189243


   ICD Code: G47.33 - OBSTRUCTIVE SLEEP APNEA (ADULT) (PEDIATRIC)   Status: 

Acute   Current Visit: Yes   





(6) Pulmonary edema


SNOMED Code(s): 04333505


   ICD Code: J81.1 - CHRONIC PULMONARY EDEMA   Status: Acute   Current Visit: No

   


Qualifiers: 


   Chronicity: acute   Qualified Code(s): J81.0 - Acute pulmonary edema   





(7) History of smoking


SNOMED Code(s): 525402812


   ICD Code: Z87.891 - PERSONAL HISTORY OF NICOTINE DEPENDENCE   Status: Acute  

 Current Visit: Yes   


Problem List Initiated/Reviewed/Updated: Yes


Orders Last 24hrs: 


                               Active Orders 24 hr











 Category Date Time Status


 


 Admission Status [Patient Status] [ADT] Stat ADT  10/28/21 18:59 Active


 


 Ambulate [RC] ASDIRECTED Care  10/28/21 20:30 Active


 


 Antiembolic Devices [RC] PER UNIT ROUTINE Care  10/28/21 20:35 Active


 


 Cardiac Monitoring [RC] .AS DIRECTED Care  10/28/21 16:20 Active


 


 Cardiac Monitoring [RC] .AS DIRECTED Care  10/28/21 18:59 Active


 


 Daily Weight [Height and Weight] [RC] DAILY Care  10/28/21 20:38 Active


 


 Oxygen Therapy [RC] PRN Care  10/28/21 20:30 Active


 


 Pulse Oximetry [RC] ASDIRECTED Care  10/28/21 16:20 Active


 


 RT Aerosol Therapy [RC] ASDIRECTED Care  10/28/21 20:36 Active


 


 VTE/DVT Education [RC] PER UNIT ROUTINE Care  10/28/21 20:30 Active


 


 Vital Signs [RC] Q4H Care  10/28/21 20:30 Active


 


 Heart Healthy Diet [DIET] Diet  10/28/21 Dinner Active


 


 CULTURE BLOOD [BC] Stat Lab  10/28/21 18:23 Received


 


 CULTURE BLOOD [BC] Stat Lab  10/28/21 18:27 Received


 


 TROPONIN I [CHEM] Stat Lab  10/28/21 20:37 Ordered


 


 Acetaminophen [TylenoL] Med  10/28/21 20:30 Active





 650 mg PO Q4H PRN   


 


 Albuterol/Ipratropium [DuoNeb 3.0-0.5 MG/3 ML] Med  10/28/21 20:30 Active





 3 ml NEB Q4HRRT PRN   


 


 Aspirin Med  10/28/21 19:00 Active





 162 mg PO DAILY   


 


 Furosemide [Lasix] Med  10/29/21 09:00 Active





 20 mg IVPUSH DAILY   


 


 Heparin Sodium Med  10/28/21 20:30 Active





 5,000 units SUBCUT Q8H   


 


 Levofloxacin/Dextrose 5%-Water [Levaquin in D5W 750 MG/ Med  10/29/21 09:00 

Ordered





 150 ML] 750 mg   





 Premix Bag 1 bag   





 IV Q24H   


 


 Ondansetron [Zofran] Med  10/28/21 20:30 Active





 4 mg IVPUSH Q4H PRN   


 


 Pantoprazole [ProTONIX IV***] Med  10/28/21 20:30 Active





 40 mg IV Q24H   


 


 Sodium Chloride 0.9% [Saline Flush] Med  10/28/21 16:20 Active





 10 ml FLUSH ASDIRECTED PRN   


 


 Sodium Chloride 0.9% [Saline Flush] Med  10/28/21 16:20 Active





 2.5 ml FLUSH ASDIRECTED PRN   


 


 Blood Culture x2 Reflex Set [OM.PC] Stat Oth  10/28/21 17:46 Ordered


 


 Saline Lock Insert [OM.PC] Stat Oth  10/28/21 16:20 Ordered


 


 Sequential Compression Device [OM.PC] Per Unit Routine Oth  10/28/21 20:31 

Ordered


 


 Resuscitation Status Routine Resus Stat  10/28/21 20:30 Ordered








                                Medication Orders





Acetaminophen (Acetaminophen 325 Mg Tab)  650 mg PO Q4H PRN


   PRN Reason: Pain (Mild 1-3)/fever


Albuterol/Ipratropium (Albuterol/Ipratropium 3.0-0.5 Mg/3 Ml Neb Soln)  3 ml NEB

 Q4HRRT PRN


   PRN Reason: Shortness Of Breath/wheezing


Aspirin (Aspirin 81 Mg Tab.Chew)  162 mg PO DAILY [facility]


   Last Admin: 10/28/21 19:21  Dose: 162 mg


   Documented by: PAVITHRA


Furosemide (Furosemide 20 Mg/2 Ml Vial)  20 mg IVPUSH DAILY [facility]


Heparin Sodium (Porcine) (Heparin Sodium 5,000 Units/Ml Vial)  5,000 units 

SUBCUT Q8H [facility]


   Last Admin: 10/28/21 20:47  Dose: 5,000 units


   Documented by: KRISTEN


Ondansetron HCl (Ondansetron 4 Mg/2 Ml Sdv)  4 mg IVPUSH Q4H PRN


   PRN Reason: Nausea/Vomiting


Pantoprazole Sodium (Pantoprazole 40 Mg Vial)  40 mg IV Q24H [facility]


   Last Admin: 10/28/21 20:47  Dose: 40 mg


   Documented by: KRISTEN


Sodium Chloride (Sodium Chloride 0.9% 10 Ml Syringe)  10 ml FLUSH ASDIRECTED PRN


   PRN Reason: Keep Vein Open


   Last Admin: 10/28/21 16:38  Dose: 10 ml


   Documented by: ADY


Sodium Chloride (Sodium Chloride 0.9% 2.5 Ml Syringe)  2.5 ml FLUSH ASDIRECTED 

PRN


   PRN Reason: Keep Vein Open


   Last Admin: 10/28/21 16:38  Dose: 2.5 ml


   Documented by: ADY








Assessment/Plan Comment:: 





89-year-old female admitted for shortness of breath attributed to possible fluid

 overload versus pneumonia


Patient was started on IV antibiotics in the ER, on IV vancomycin and Zosyn, 

will de-escalate to Levaquin for now


Patient received IV Lasix in the ER, continue with IV Lasix 20 mg daily and 

monitor fluid balance daily


2D echo was done recently in August so will not repeat echo


Patient was on oxygen in the ER but was satting in high 90s possibly will be 

able to wean off the oxygen


Resume home meds once med rec has been verified


We will check 1 more set of troponin


She has been off antihypertensives for a while now due to her soft blood 

pressure


Anticipate 2 midnight stays


Lovenox for DVT prophylaxis

## 2021-10-28 NOTE — CT
INDICATION:



Shortness of breath hypoxia



TECHNIQUE:



CT chest pulmonary PE protocol acquired with 75 cc Isovue 370 IV contrast.



COMPARISON:



None 



FINDINGS:



Cardiovascular structures: Normal vascular enhancement of the pulmonary 

arteries, no sign of pulmonary embolism.  Heart size is normal. Small 

pericardial effusion. Aortic valve calcifications. No sign of aneurysm in 

the thoracic aorta.  



Mediastinum and vern: 1.5 cm right hilar lymph node. 



Lungs: New 1.3 cm left lower lobe pulmonary mass. Stable 3 mm subpleural 

pulmonary nodule in right upper lobe image 34 series 402. New 7 mm 

subpleural pulmonary nodule in the right upper lobe image 78 series 402. 

Mild interlobular septal thickening. 



Pleura: Small bilateral pleural effusions. 



Chest wall and axilla: No mass or adenopathy.  



Upper abdomen: Simple cyst on the right kidney. 



Bones: T10 hemangioma. Old T11 fracture. 



IMPRESSION:



No pulmonary embolism or pneumonia. 



 



New bilateral pulmonary nodules. These may represent infection or 

metastatic disease. Correlate with clinical history.



Mild pulmonary venous congestion with small bilateral pleural effusions. 



Right hilar adenopathy.



Small pericardial effusion.



Please note that all CT scans at this facility use dose modulation, 

iterative reconstruction, and/or weight-based dosing when appropriate to 

reduce radiation dose to as low as reasonably achievable.



Dictated by Jasmin Multani MD @ 10/28/2021 6:43:10 PM



(Electronically Signed)

## 2021-10-28 NOTE — CR
Indication:



Chest pain



Technique:



Chest 1 view



Comparison:



No August 6, 2021 ne



Findings/Impression:



Stable cardiomediastinal silhouette. There are some faint nodular opacities 

lungs which appear new compared to the prior study. This could represent 

infection. Consider chest CT for further evaluation if clinically 

indicated. No pneumothorax or effusion. No acute osseous abnormality



Dictated by Jasmin Multani MD @ 10/28/2021 5:03:36 PM



(Electronically Signed)

## 2021-10-28 NOTE — PCM.EKG
** #1 Interpretation


Time: 16:06


EKG Interpretation Comments: 





EKG: 


NSR, nonspecific ST/T changes, Rate -85.  Intermittent PVC

## 2021-10-29 NOTE — PCM.PN
- General Info


Date of Service: 10/29/21





- Review of Systems


Systems Review Comment:: 





feeling better, shortness of breath resolving





- Patient Data


Vitals - Most Recent: 


                                Last Vital Signs











Temp  37.2 C   10/29/21 11:50


 


Pulse  87   10/29/21 11:50


 


Resp  20   10/29/21 11:50


 


BP  120/53 L  10/29/21 11:50


 


Pulse Ox  94 L  10/29/21 11:50











Weight - Most Recent: 68.629 kg


I&O - Last 24 Hours: 


                                 Intake & Output











 10/29/21 10/29/21 10/29/21





 06:59 14:59 22:59


 


Intake Total 100  


 


Output Total 300  


 


Balance -200  











Lab Results Last 24 Hours: 


                         Laboratory Results - last 24 hr











  10/28/21 10/28/21 10/28/21 Range/Units





  16:00 16:05 16:05 


 


WBC   2.88 L   (4.0-11.0)  K/uL


 


RBC   2.85 L   (4.30-5.90)  M/uL


 


Hgb   8.3 L   (12.0-16.0)  g/dL


 


Hct   24.6 L   (36.0-46.0)  %


 


MCV   86.3   (80.0-98.0)  fL


 


MCH   29.1   (27.0-32.0)  pg


 


MCHC   33.7   (31.0-37.0)  g/dL


 


RDW Std Deviation   59.9   (28.0-62.0)  fl


 


RDW Coeff of Brody   19 H   (11.0-15.0)  %


 


Plt Count   144 L   (150-400)  K/uL


 


MPV   9.80   (7.40-12.00)  fL


 


Neut % (Auto)   69.5   (48.0-80.0)  %


 


Lymph % (Auto)   23.6   (16.0-40.0)  %


 


Mono % (Auto)   5.2   (0.0-15.0)  %


 


Eos % (Auto)   1.7   (0.0-7.0)  %


 


Baso % (Auto)   0.0   (0.0-1.5)  %


 


Neut # (Auto)   2.0   (1.4-5.7)  K/uL


 


Lymph # (Auto)   0.7   (0.6-2.4)  K/uL


 


Mono # (Auto)   0.2   (0.0-0.8)  K/uL


 


Eos # (Auto)   0.1   (0.0-0.7)  K/uL


 


Baso # (Auto)   0.0   (0.0-0.1)  K/uL


 


Nucleated RBC %   0.0   /100WBC


 


Nucleated RBCs #   0   K/uL


 


INR     


 


Sodium    139  (136-145)  mmol/L


 


Potassium    3.5  (3.5-5.1)  mmol/L


 


Chloride    102  ()  mmol/L


 


Carbon Dioxide    28.5  (21.0-32.0)  mmol/L


 


BUN    17  (7.0-18.0)  mg/dL


 


Creatinine    0.8  (0.6-1.0)  mg/dL


 


Est Cr Clr Drug Dosing    TNP  


 


Estimated GFR (MDRD)    > 60.0  ml/min


 


Glucose    106  ()  mg/dL


 


Lactic Acid     (0.4-2.0)  mmol/L


 


Calcium    9.3  (8.5-10.1)  mg/dL


 


Phosphorus     (2.6-4.7)  mg/dL


 


Magnesium     (1.8-2.4)  mg/dL


 


Total Bilirubin    1.6 H  (0.2-1.0)  mg/dL


 


AST    14 L  (15-37)  IU/L


 


ALT    8 L  (14-63)  IU/L


 


Alkaline Phosphatase    124 H  ()  U/L


 


Troponin I    < 0.050  (0.000-0.056)  ng/mL


 


B-Natriuretic Peptide     (<100)  PG/ML


 


Total Protein    7.9  (6.4-8.2)  g/dL


 


Albumin    3.4  (3.4-5.0)  g/dL


 


Globulin    4.5 H  (2.6-4.0)  g/dL


 


Albumin/Globulin Ratio    0.8 L  (0.9-1.6)  


 


Urine Color     


 


Urine Appearance     


 


Urine pH     (5.0-8.0)  


 


Ur Specific Gravity     (1.001-1.035)  


 


Urine Protein     (NEGATIVE)  mg/dL


 


Urine Glucose (UA)     (NEGATIVE)  mg/dL


 


Urine Ketones     (NEGATIVE)  mg/dL


 


Urine Occult Blood     (NEGATIVE)  


 


Urine Nitrite     (NEGATIVE)  


 


Urine Bilirubin     (NEGATIVE)  


 


Urine Ictotest     


 


Urine Urobilinogen     (<2.0)  EU/dL


 


Ur Leukocyte Esterase     (NEGATIVE)  


 


Urine RBC     (0-2/HPF)  


 


Urine WBC     (0-5/HPF)  


 


Ur Epithelial Cells     (NONE-FEW)  


 


Calcium Oxalate Crystal     (NEGATIVE)  


 


Urine Bacteria     (NEGATIVE)  


 


Urine Mucus     (NONE-MOD)  


 


SARS-CoV-2 RNA (NOHEMI)  NEGATIVE    (NEGATIVE)  














  10/28/21 10/28/21 10/28/21 Range/Units





  16:05 16:05 16:53 


 


WBC     (4.0-11.0)  K/uL


 


RBC     (4.30-5.90)  M/uL


 


Hgb     (12.0-16.0)  g/dL


 


Hct     (36.0-46.0)  %


 


MCV     (80.0-98.0)  fL


 


MCH     (27.0-32.0)  pg


 


MCHC     (31.0-37.0)  g/dL


 


RDW Std Deviation     (28.0-62.0)  fl


 


RDW Coeff of Brody     (11.0-15.0)  %


 


Plt Count     (150-400)  K/uL


 


MPV     (7.40-12.00)  fL


 


Neut % (Auto)     (48.0-80.0)  %


 


Lymph % (Auto)     (16.0-40.0)  %


 


Mono % (Auto)     (0.0-15.0)  %


 


Eos % (Auto)     (0.0-7.0)  %


 


Baso % (Auto)     (0.0-1.5)  %


 


Neut # (Auto)     (1.4-5.7)  K/uL


 


Lymph # (Auto)     (0.6-2.4)  K/uL


 


Mono # (Auto)     (0.0-0.8)  K/uL


 


Eos # (Auto)     (0.0-0.7)  K/uL


 


Baso # (Auto)     (0.0-0.1)  K/uL


 


Nucleated RBC %     /100WBC


 


Nucleated RBCs #     K/uL


 


INR  1.11    


 


Sodium     (136-145)  mmol/L


 


Potassium     (3.5-5.1)  mmol/L


 


Chloride     ()  mmol/L


 


Carbon Dioxide     (21.0-32.0)  mmol/L


 


BUN     (7.0-18.0)  mg/dL


 


Creatinine     (0.6-1.0)  mg/dL


 


Est Cr Clr Drug Dosing     


 


Estimated GFR (MDRD)     ml/min


 


Glucose     ()  mg/dL


 


Lactic Acid     (0.4-2.0)  mmol/L


 


Calcium     (8.5-10.1)  mg/dL


 


Phosphorus     (2.6-4.7)  mg/dL


 


Magnesium     (1.8-2.4)  mg/dL


 


Total Bilirubin     (0.2-1.0)  mg/dL


 


AST     (15-37)  IU/L


 


ALT     (14-63)  IU/L


 


Alkaline Phosphatase     ()  U/L


 


Troponin I     (0.000-0.056)  ng/mL


 


B-Natriuretic Peptide   258 H   (<100)  PG/ML


 


Total Protein     (6.4-8.2)  g/dL


 


Albumin     (3.4-5.0)  g/dL


 


Globulin     (2.6-4.0)  g/dL


 


Albumin/Globulin Ratio     (0.9-1.6)  


 


Urine Color    YELLOW  


 


Urine Appearance    SLT CLOUDY  


 


Urine pH    6.0  (5.0-8.0)  


 


Ur Specific Gravity    >= 1.030  (1.001-1.035)  


 


Urine Protein    TRACE H  (NEGATIVE)  mg/dL


 


Urine Glucose (UA)    NEGATIVE  (NEGATIVE)  mg/dL


 


Urine Ketones    TRACE H  (NEGATIVE)  mg/dL


 


Urine Occult Blood    NEGATIVE  (NEGATIVE)  


 


Urine Nitrite    POSITIVE H  (NEGATIVE)  


 


Urine Bilirubin    SMALL H  (NEGATIVE)  


 


Urine Ictotest    NEGATIVE  


 


Urine Urobilinogen    2.0 H  (<2.0)  EU/dL


 


Ur Leukocyte Esterase    SMALL H  (NEGATIVE)  


 


Urine RBC    NONE SEEN  (0-2/HPF)  


 


Urine WBC    5-10  (0-5/HPF)  


 


Ur Epithelial Cells    FEW  (NONE-FEW)  


 


Calcium Oxalate Crystal    FEW  (NEGATIVE)  


 


Urine Bacteria    FEW  (NEGATIVE)  


 


Urine Mucus    MODERATE  (NONE-MOD)  


 


SARS-CoV-2 RNA (NOHEMI)     (NEGATIVE)  














  10/28/21 10/28/21 10/29/21 Range/Units





  18:27 20:48 06:40 


 


WBC    2.49 L  (4.0-11.0)  K/uL


 


RBC    2.70 L  (4.30-5.90)  M/uL


 


Hgb    7.8 L  (12.0-16.0)  g/dL


 


Hct    23.8 L  (36.0-46.0)  %


 


MCV    88.1  (80.0-98.0)  fL


 


MCH    28.9  (27.0-32.0)  pg


 


MCHC    32.8  (31.0-37.0)  g/dL


 


RDW Std Deviation    61.3  (28.0-62.0)  fl


 


RDW Coeff of Brody    19 H  (11.0-15.0)  %


 


Plt Count    133 L  (150-400)  K/uL


 


MPV    10.60  (7.40-12.00)  fL


 


Neut % (Auto)    73.9  (48.0-80.0)  %


 


Lymph % (Auto)    18.9  (16.0-40.0)  %


 


Mono % (Auto)    5.2  (0.0-15.0)  %


 


Eos % (Auto)    2.0  (0.0-7.0)  %


 


Baso % (Auto)    0.0  (0.0-1.5)  %


 


Neut # (Auto)    1.8  (1.4-5.7)  K/uL


 


Lymph # (Auto)    0.5 L  (0.6-2.4)  K/uL


 


Mono # (Auto)    0.1  (0.0-0.8)  K/uL


 


Eos # (Auto)    0.1  (0.0-0.7)  K/uL


 


Baso # (Auto)    0.0  (0.0-0.1)  K/uL


 


Nucleated RBC %    0.0  /100WBC


 


Nucleated RBCs #    0  K/uL


 


INR     


 


Sodium     (136-145)  mmol/L


 


Potassium     (3.5-5.1)  mmol/L


 


Chloride     ()  mmol/L


 


Carbon Dioxide     (21.0-32.0)  mmol/L


 


BUN     (7.0-18.0)  mg/dL


 


Creatinine     (0.6-1.0)  mg/dL


 


Est Cr Clr Drug Dosing     


 


Estimated GFR (MDRD)     ml/min


 


Glucose     ()  mg/dL


 


Lactic Acid  0.5    (0.4-2.0)  mmol/L


 


Calcium     (8.5-10.1)  mg/dL


 


Phosphorus     (2.6-4.7)  mg/dL


 


Magnesium     (1.8-2.4)  mg/dL


 


Total Bilirubin     (0.2-1.0)  mg/dL


 


AST     (15-37)  IU/L


 


ALT     (14-63)  IU/L


 


Alkaline Phosphatase     ()  U/L


 


Troponin I   < 0.050   (0.000-0.056)  ng/mL


 


B-Natriuretic Peptide     (<100)  PG/ML


 


Total Protein     (6.4-8.2)  g/dL


 


Albumin     (3.4-5.0)  g/dL


 


Globulin     (2.6-4.0)  g/dL


 


Albumin/Globulin Ratio     (0.9-1.6)  


 


Urine Color     


 


Urine Appearance     


 


Urine pH     (5.0-8.0)  


 


Ur Specific Gravity     (1.001-1.035)  


 


Urine Protein     (NEGATIVE)  mg/dL


 


Urine Glucose (UA)     (NEGATIVE)  mg/dL


 


Urine Ketones     (NEGATIVE)  mg/dL


 


Urine Occult Blood     (NEGATIVE)  


 


Urine Nitrite     (NEGATIVE)  


 


Urine Bilirubin     (NEGATIVE)  


 


Urine Ictotest     


 


Urine Urobilinogen     (<2.0)  EU/dL


 


Ur Leukocyte Esterase     (NEGATIVE)  


 


Urine RBC     (0-2/HPF)  


 


Urine WBC     (0-5/HPF)  


 


Ur Epithelial Cells     (NONE-FEW)  


 


Calcium Oxalate Crystal     (NEGATIVE)  


 


Urine Bacteria     (NEGATIVE)  


 


Urine Mucus     (NONE-MOD)  


 


SARS-CoV-2 RNA (NOHEMI)     (NEGATIVE)  














  10/29/21 Range/Units





  06:40 


 


WBC   (4.0-11.0)  K/uL


 


RBC   (4.30-5.90)  M/uL


 


Hgb   (12.0-16.0)  g/dL


 


Hct   (36.0-46.0)  %


 


MCV   (80.0-98.0)  fL


 


MCH   (27.0-32.0)  pg


 


MCHC   (31.0-37.0)  g/dL


 


RDW Std Deviation   (28.0-62.0)  fl


 


RDW Coeff of Brody   (11.0-15.0)  %


 


Plt Count   (150-400)  K/uL


 


MPV   (7.40-12.00)  fL


 


Neut % (Auto)   (48.0-80.0)  %


 


Lymph % (Auto)   (16.0-40.0)  %


 


Mono % (Auto)   (0.0-15.0)  %


 


Eos % (Auto)   (0.0-7.0)  %


 


Baso % (Auto)   (0.0-1.5)  %


 


Neut # (Auto)   (1.4-5.7)  K/uL


 


Lymph # (Auto)   (0.6-2.4)  K/uL


 


Mono # (Auto)   (0.0-0.8)  K/uL


 


Eos # (Auto)   (0.0-0.7)  K/uL


 


Baso # (Auto)   (0.0-0.1)  K/uL


 


Nucleated RBC %   /100WBC


 


Nucleated RBCs #   K/uL


 


INR   


 


Sodium  142  (136-145)  mmol/L


 


Potassium  3.5  (3.5-5.1)  mmol/L


 


Chloride  104  ()  mmol/L


 


Carbon Dioxide  27.2  (21.0-32.0)  mmol/L


 


BUN  16  (7.0-18.0)  mg/dL


 


Creatinine  0.8  (0.6-1.0)  mg/dL


 


Est Cr Clr Drug Dosing  41.17  


 


Estimated GFR (MDRD)  > 60.0  ml/min


 


Glucose  142 H  ()  mg/dL


 


Lactic Acid   (0.4-2.0)  mmol/L


 


Calcium  9.3  (8.5-10.1)  mg/dL


 


Phosphorus  2.8  (2.6-4.7)  mg/dL


 


Magnesium  2.1  (1.8-2.4)  mg/dL


 


Total Bilirubin   (0.2-1.0)  mg/dL


 


AST   (15-37)  IU/L


 


ALT   (14-63)  IU/L


 


Alkaline Phosphatase   ()  U/L


 


Troponin I   (0.000-0.056)  ng/mL


 


B-Natriuretic Peptide   (<100)  PG/ML


 


Total Protein   (6.4-8.2)  g/dL


 


Albumin   (3.4-5.0)  g/dL


 


Globulin   (2.6-4.0)  g/dL


 


Albumin/Globulin Ratio   (0.9-1.6)  


 


Urine Color   


 


Urine Appearance   


 


Urine pH   (5.0-8.0)  


 


Ur Specific Gravity   (1.001-1.035)  


 


Urine Protein   (NEGATIVE)  mg/dL


 


Urine Glucose (UA)   (NEGATIVE)  mg/dL


 


Urine Ketones   (NEGATIVE)  mg/dL


 


Urine Occult Blood   (NEGATIVE)  


 


Urine Nitrite   (NEGATIVE)  


 


Urine Bilirubin   (NEGATIVE)  


 


Urine Ictotest   


 


Urine Urobilinogen   (<2.0)  EU/dL


 


Ur Leukocyte Esterase   (NEGATIVE)  


 


Urine RBC   (0-2/HPF)  


 


Urine WBC   (0-5/HPF)  


 


Ur Epithelial Cells   (NONE-FEW)  


 


Calcium Oxalate Crystal   (NEGATIVE)  


 


Urine Bacteria   (NEGATIVE)  


 


Urine Mucus   (NONE-MOD)  


 


SARS-CoV-2 RNA (NOHEMI)   (NEGATIVE)  











Med Orders - Current: 


                               Current Medications





Acetaminophen (Acetaminophen 325 Mg Tab)  650 mg PO Q4H PRN


   PRN Reason: Pain (Mild 1-3)/fever


   Last Admin: 10/29/21 12:06 Dose:  650 mg


   Documented by: 


Albuterol/Ipratropium (Albuterol/Ipratropium 3.0-0.5 Mg/3 Ml Neb Soln)  3 ml NEB

Q4HRRT PRN


   PRN Reason: Shortness Of Breath/wheezing


Aspirin (Aspirin 81 Mg Tab.Chew)  162 mg PO DAILY [facility]


   Last Admin: 10/29/21 10:13 Dose:  162 mg


   Documented by: 


Carbidopa/Levodopa (Carbidopa/Levodopa  Mg Tab)  1.5 tab PO TID [facility]


Furosemide (Furosemide 20 Mg/2 Ml Vial)  20 mg IVPUSH DAILY [facility]


   Last Admin: 10/29/21 10:14 Dose:  20 mg


   Documented by: 


Gabapentin (Gabapentin 100 Mg Cap)  100 mg PO TID [facility]


Heparin Sodium (Porcine) (Heparin Sodium 5,000 Units/Ml Vial)  5,000 units 

SUBCUT Q8H [facility]


   Last Admin: 10/29/21 11:36 Dose:  5,000 units


   Documented by: 


Levofloxacin/Dextrose 750 mg/ (Premix)  150 mls @ 100 mls/hr IV Q24H [facility]


   Last Admin: 10/29/21 10:13 Dose:  100 mls/hr


   Documented by: 


Ondansetron HCl (Ondansetron 4 Mg/2 Ml Sdv)  4 mg IVPUSH Q4H PRN


   PRN Reason: Nausea/Vomiting


Pantoprazole Sodium (Pantoprazole 40 Mg Vial)  40 mg IV Q24H [facility]


   Last Admin: 10/28/21 20:47 Dose:  40 mg


   Documented by: 


Esomeprazole Magnesium [Nexium 24hr] 20 Mg Tablet.   1 each PO QAM [facility]


Sertraline HCl (Sertraline 100 Mg Tab)  100 mg PO BID [facility]


   Last Admin: 10/29/21 15:18 Dose:  Not Given


   Documented by: 


Simvastatin (Simvastatin 20 Mg Tab)  20 mg PO BEDTIME [facility]


Sodium Chloride (Sodium Chloride 0.9% 10 Ml Syringe)  10 ml FLUSH ASDIRECTED PRN


   PRN Reason: Keep Vein Open


   Last Admin: 10/28/21 16:38 Dose:  10 ml


   Documented by: 


Sodium Chloride (Sodium Chloride 0.9% 2.5 Ml Syringe)  2.5 ml FLUSH ASDIRECTED 

PRN


   PRN Reason: Keep Vein Open


   Last Admin: 10/28/21 16:38 Dose:  2.5 ml


   Documented by: 





Discontinued Medications





Furosemide (Furosemide 40 Mg/4 Ml Vial) Confirm Administered Dose 40 mg .ROUTE 

.STK-MED ONE


   Stop: 10/28/21 19:13


   Last Admin: 10/28/21 19:20 Dose:  Not Given


   Documented by: 


Furosemide (Furosemide 40 Mg/4 Ml Vial)  20 mg IVPUSH NOW ONE


   Stop: 10/28/21 19:19


   Last Admin: 10/28/21 19:22 Dose:  40 mg


   Documented by: 


Piperacillin Sod/Tazobactam (Sod 3.375 gm/ Sodium Chloride)  50 mls @ 100 mls/hr

IV ONETIME ONE


   Stop: 10/28/21 18:13


   Last Admin: 10/28/21 18:22 Dose:  100 mls/hr


   Documented by: 


Vancomycin HCl 1.25 gm/ Premix  250 mls @ 166.667 mls/hr IV ONETIME ONE


   Stop: 10/28/21 19:59


   Last Admin: 10/28/21 18:22 Dose:  166.667 mls/hr


   Documented by: 


Furosemide 20 mg/ Sodium (Chloride)  52 mls @ 100 mls/hr IV NOW STA


   Stop: 10/28/21 19:32


   Last Admin: 10/28/21 19:14 Dose:  Not Given


   Documented by: 


Iopamidol (Iopamidol 755 Mg/Ml 500 Ml Multipack Bottle)  75 ml IVPUSH ONETIME 

STA


   Stop: 10/28/21 19:01


   Last Admin: 10/28/21 19:01 Dose:  75 ml


   Documented by: 


Vancomycin HCl (Pharmacy To Dose - Vancomycin)  1 dose .XX ONETIME ONE


   Stop: 10/28/21 17:46


   Last Admin: 10/28/21 18:23 Dose:  Not Given


   Documented by: 











- Exam


General: Alert, Oriented


Neck: Supple


Lungs: Clear to Auscultation, Normal Respiratory Effort


Cardiovascular: Regular Rate, Regular Rhythm


GI/Abdominal Exam: Normal Bowel Sounds, Soft, Non-Tender


Extremities: Non-Tender, No Pedal Edema


Skin: Warm, Dry, Intact


Neurological: No New Focal Deficit





- Patient Data


Lab Results Last 24 hrs: 


                         Laboratory Results - last 24 hr











  10/28/21 10/28/21 10/28/21 Range/Units





  16:00 16:05 16:05 


 


WBC   2.88 L   (4.0-11.0)  K/uL


 


RBC   2.85 L   (4.30-5.90)  M/uL


 


Hgb   8.3 L   (12.0-16.0)  g/dL


 


Hct   24.6 L   (36.0-46.0)  %


 


MCV   86.3   (80.0-98.0)  fL


 


MCH   29.1   (27.0-32.0)  pg


 


MCHC   33.7   (31.0-37.0)  g/dL


 


RDW Std Deviation   59.9   (28.0-62.0)  fl


 


RDW Coeff of Brody   19 H   (11.0-15.0)  %


 


Plt Count   144 L   (150-400)  K/uL


 


MPV   9.80   (7.40-12.00)  fL


 


Neut % (Auto)   69.5   (48.0-80.0)  %


 


Lymph % (Auto)   23.6   (16.0-40.0)  %


 


Mono % (Auto)   5.2   (0.0-15.0)  %


 


Eos % (Auto)   1.7   (0.0-7.0)  %


 


Baso % (Auto)   0.0   (0.0-1.5)  %


 


Neut # (Auto)   2.0   (1.4-5.7)  K/uL


 


Lymph # (Auto)   0.7   (0.6-2.4)  K/uL


 


Mono # (Auto)   0.2   (0.0-0.8)  K/uL


 


Eos # (Auto)   0.1   (0.0-0.7)  K/uL


 


Baso # (Auto)   0.0   (0.0-0.1)  K/uL


 


Nucleated RBC %   0.0   /100WBC


 


Nucleated RBCs #   0   K/uL


 


INR     


 


Sodium    139  (136-145)  mmol/L


 


Potassium    3.5  (3.5-5.1)  mmol/L


 


Chloride    102  ()  mmol/L


 


Carbon Dioxide    28.5  (21.0-32.0)  mmol/L


 


BUN    17  (7.0-18.0)  mg/dL


 


Creatinine    0.8  (0.6-1.0)  mg/dL


 


Est Cr Clr Drug Dosing    TNP  


 


Estimated GFR (MDRD)    > 60.0  ml/min


 


Glucose    106  ()  mg/dL


 


Lactic Acid     (0.4-2.0)  mmol/L


 


Calcium    9.3  (8.5-10.1)  mg/dL


 


Phosphorus     (2.6-4.7)  mg/dL


 


Magnesium     (1.8-2.4)  mg/dL


 


Total Bilirubin    1.6 H  (0.2-1.0)  mg/dL


 


AST    14 L  (15-37)  IU/L


 


ALT    8 L  (14-63)  IU/L


 


Alkaline Phosphatase    124 H  ()  U/L


 


Troponin I    < 0.050  (0.000-0.056)  ng/mL


 


B-Natriuretic Peptide     (<100)  PG/ML


 


Total Protein    7.9  (6.4-8.2)  g/dL


 


Albumin    3.4  (3.4-5.0)  g/dL


 


Globulin    4.5 H  (2.6-4.0)  g/dL


 


Albumin/Globulin Ratio    0.8 L  (0.9-1.6)  


 


Urine Color     


 


Urine Appearance     


 


Urine pH     (5.0-8.0)  


 


Ur Specific Gravity     (1.001-1.035)  


 


Urine Protein     (NEGATIVE)  mg/dL


 


Urine Glucose (UA)     (NEGATIVE)  mg/dL


 


Urine Ketones     (NEGATIVE)  mg/dL


 


Urine Occult Blood     (NEGATIVE)  


 


Urine Nitrite     (NEGATIVE)  


 


Urine Bilirubin     (NEGATIVE)  


 


Urine Ictotest     


 


Urine Urobilinogen     (<2.0)  EU/dL


 


Ur Leukocyte Esterase     (NEGATIVE)  


 


Urine RBC     (0-2/HPF)  


 


Urine WBC     (0-5/HPF)  


 


Ur Epithelial Cells     (NONE-FEW)  


 


Calcium Oxalate Crystal     (NEGATIVE)  


 


Urine Bacteria     (NEGATIVE)  


 


Urine Mucus     (NONE-MOD)  


 


SARS-CoV-2 RNA (NOHEMI)  NEGATIVE    (NEGATIVE)  














  10/28/21 10/28/21 10/28/21 Range/Units





  16:05 16:05 16:53 


 


WBC     (4.0-11.0)  K/uL


 


RBC     (4.30-5.90)  M/uL


 


Hgb     (12.0-16.0)  g/dL


 


Hct     (36.0-46.0)  %


 


MCV     (80.0-98.0)  fL


 


MCH     (27.0-32.0)  pg


 


MCHC     (31.0-37.0)  g/dL


 


RDW Std Deviation     (28.0-62.0)  fl


 


RDW Coeff of Brody     (11.0-15.0)  %


 


Plt Count     (150-400)  K/uL


 


MPV     (7.40-12.00)  fL


 


Neut % (Auto)     (48.0-80.0)  %


 


Lymph % (Auto)     (16.0-40.0)  %


 


Mono % (Auto)     (0.0-15.0)  %


 


Eos % (Auto)     (0.0-7.0)  %


 


Baso % (Auto)     (0.0-1.5)  %


 


Neut # (Auto)     (1.4-5.7)  K/uL


 


Lymph # (Auto)     (0.6-2.4)  K/uL


 


Mono # (Auto)     (0.0-0.8)  K/uL


 


Eos # (Auto)     (0.0-0.7)  K/uL


 


Baso # (Auto)     (0.0-0.1)  K/uL


 


Nucleated RBC %     /100WBC


 


Nucleated RBCs #     K/uL


 


INR  1.11    


 


Sodium     (136-145)  mmol/L


 


Potassium     (3.5-5.1)  mmol/L


 


Chloride     ()  mmol/L


 


Carbon Dioxide     (21.0-32.0)  mmol/L


 


BUN     (7.0-18.0)  mg/dL


 


Creatinine     (0.6-1.0)  mg/dL


 


Est Cr Clr Drug Dosing     


 


Estimated GFR (MDRD)     ml/min


 


Glucose     ()  mg/dL


 


Lactic Acid     (0.4-2.0)  mmol/L


 


Calcium     (8.5-10.1)  mg/dL


 


Phosphorus     (2.6-4.7)  mg/dL


 


Magnesium     (1.8-2.4)  mg/dL


 


Total Bilirubin     (0.2-1.0)  mg/dL


 


AST     (15-37)  IU/L


 


ALT     (14-63)  IU/L


 


Alkaline Phosphatase     ()  U/L


 


Troponin I     (0.000-0.056)  ng/mL


 


B-Natriuretic Peptide   258 H   (<100)  PG/ML


 


Total Protein     (6.4-8.2)  g/dL


 


Albumin     (3.4-5.0)  g/dL


 


Globulin     (2.6-4.0)  g/dL


 


Albumin/Globulin Ratio     (0.9-1.6)  


 


Urine Color    YELLOW  


 


Urine Appearance    SLT CLOUDY  


 


Urine pH    6.0  (5.0-8.0)  


 


Ur Specific Gravity    >= 1.030  (1.001-1.035)  


 


Urine Protein    TRACE H  (NEGATIVE)  mg/dL


 


Urine Glucose (UA)    NEGATIVE  (NEGATIVE)  mg/dL


 


Urine Ketones    TRACE H  (NEGATIVE)  mg/dL


 


Urine Occult Blood    NEGATIVE  (NEGATIVE)  


 


Urine Nitrite    POSITIVE H  (NEGATIVE)  


 


Urine Bilirubin    SMALL H  (NEGATIVE)  


 


Urine Ictotest    NEGATIVE  


 


Urine Urobilinogen    2.0 H  (<2.0)  EU/dL


 


Ur Leukocyte Esterase    SMALL H  (NEGATIVE)  


 


Urine RBC    NONE SEEN  (0-2/HPF)  


 


Urine WBC    5-10  (0-5/HPF)  


 


Ur Epithelial Cells    FEW  (NONE-FEW)  


 


Calcium Oxalate Crystal    FEW  (NEGATIVE)  


 


Urine Bacteria    FEW  (NEGATIVE)  


 


Urine Mucus    MODERATE  (NONE-MOD)  


 


SARS-CoV-2 RNA (NOHEMI)     (NEGATIVE)  














  10/28/21 10/28/21 10/29/21 Range/Units





  18:27 20:48 06:40 


 


WBC    2.49 L  (4.0-11.0)  K/uL


 


RBC    2.70 L  (4.30-5.90)  M/uL


 


Hgb    7.8 L  (12.0-16.0)  g/dL


 


Hct    23.8 L  (36.0-46.0)  %


 


MCV    88.1  (80.0-98.0)  fL


 


MCH    28.9  (27.0-32.0)  pg


 


MCHC    32.8  (31.0-37.0)  g/dL


 


RDW Std Deviation    61.3  (28.0-62.0)  fl


 


RDW Coeff of Brody    19 H  (11.0-15.0)  %


 


Plt Count    133 L  (150-400)  K/uL


 


MPV    10.60  (7.40-12.00)  fL


 


Neut % (Auto)    73.9  (48.0-80.0)  %


 


Lymph % (Auto)    18.9  (16.0-40.0)  %


 


Mono % (Auto)    5.2  (0.0-15.0)  %


 


Eos % (Auto)    2.0  (0.0-7.0)  %


 


Baso % (Auto)    0.0  (0.0-1.5)  %


 


Neut # (Auto)    1.8  (1.4-5.7)  K/uL


 


Lymph # (Auto)    0.5 L  (0.6-2.4)  K/uL


 


Mono # (Auto)    0.1  (0.0-0.8)  K/uL


 


Eos # (Auto)    0.1  (0.0-0.7)  K/uL


 


Baso # (Auto)    0.0  (0.0-0.1)  K/uL


 


Nucleated RBC %    0.0  /100WBC


 


Nucleated RBCs #    0  K/uL


 


INR     


 


Sodium     (136-145)  mmol/L


 


Potassium     (3.5-5.1)  mmol/L


 


Chloride     ()  mmol/L


 


Carbon Dioxide     (21.0-32.0)  mmol/L


 


BUN     (7.0-18.0)  mg/dL


 


Creatinine     (0.6-1.0)  mg/dL


 


Est Cr Clr Drug Dosing     


 


Estimated GFR (MDRD)     ml/min


 


Glucose     ()  mg/dL


 


Lactic Acid  0.5    (0.4-2.0)  mmol/L


 


Calcium     (8.5-10.1)  mg/dL


 


Phosphorus     (2.6-4.7)  mg/dL


 


Magnesium     (1.8-2.4)  mg/dL


 


Total Bilirubin     (0.2-1.0)  mg/dL


 


AST     (15-37)  IU/L


 


ALT     (14-63)  IU/L


 


Alkaline Phosphatase     ()  U/L


 


Troponin I   < 0.050   (0.000-0.056)  ng/mL


 


B-Natriuretic Peptide     (<100)  PG/ML


 


Total Protein     (6.4-8.2)  g/dL


 


Albumin     (3.4-5.0)  g/dL


 


Globulin     (2.6-4.0)  g/dL


 


Albumin/Globulin Ratio     (0.9-1.6)  


 


Urine Color     


 


Urine Appearance     


 


Urine pH     (5.0-8.0)  


 


Ur Specific Gravity     (1.001-1.035)  


 


Urine Protein     (NEGATIVE)  mg/dL


 


Urine Glucose (UA)     (NEGATIVE)  mg/dL


 


Urine Ketones     (NEGATIVE)  mg/dL


 


Urine Occult Blood     (NEGATIVE)  


 


Urine Nitrite     (NEGATIVE)  


 


Urine Bilirubin     (NEGATIVE)  


 


Urine Ictotest     


 


Urine Urobilinogen     (<2.0)  EU/dL


 


Ur Leukocyte Esterase     (NEGATIVE)  


 


Urine RBC     (0-2/HPF)  


 


Urine WBC     (0-5/HPF)  


 


Ur Epithelial Cells     (NONE-FEW)  


 


Calcium Oxalate Crystal     (NEGATIVE)  


 


Urine Bacteria     (NEGATIVE)  


 


Urine Mucus     (NONE-MOD)  


 


SARS-CoV-2 RNA (NOHEMI)     (NEGATIVE)  














  10/29/21 Range/Units





  06:40 


 


WBC   (4.0-11.0)  K/uL


 


RBC   (4.30-5.90)  M/uL


 


Hgb   (12.0-16.0)  g/dL


 


Hct   (36.0-46.0)  %


 


MCV   (80.0-98.0)  fL


 


MCH   (27.0-32.0)  pg


 


MCHC   (31.0-37.0)  g/dL


 


RDW Std Deviation   (28.0-62.0)  fl


 


RDW Coeff of Brody   (11.0-15.0)  %


 


Plt Count   (150-400)  K/uL


 


MPV   (7.40-12.00)  fL


 


Neut % (Auto)   (48.0-80.0)  %


 


Lymph % (Auto)   (16.0-40.0)  %


 


Mono % (Auto)   (0.0-15.0)  %


 


Eos % (Auto)   (0.0-7.0)  %


 


Baso % (Auto)   (0.0-1.5)  %


 


Neut # (Auto)   (1.4-5.7)  K/uL


 


Lymph # (Auto)   (0.6-2.4)  K/uL


 


Mono # (Auto)   (0.0-0.8)  K/uL


 


Eos # (Auto)   (0.0-0.7)  K/uL


 


Baso # (Auto)   (0.0-0.1)  K/uL


 


Nucleated RBC %   /100WBC


 


Nucleated RBCs #   K/uL


 


INR   


 


Sodium  142  (136-145)  mmol/L


 


Potassium  3.5  (3.5-5.1)  mmol/L


 


Chloride  104  ()  mmol/L


 


Carbon Dioxide  27.2  (21.0-32.0)  mmol/L


 


BUN  16  (7.0-18.0)  mg/dL


 


Creatinine  0.8  (0.6-1.0)  mg/dL


 


Est Cr Clr Drug Dosing  41.17  


 


Estimated GFR (MDRD)  > 60.0  ml/min


 


Glucose  142 H  ()  mg/dL


 


Lactic Acid   (0.4-2.0)  mmol/L


 


Calcium  9.3  (8.5-10.1)  mg/dL


 


Phosphorus  2.8  (2.6-4.7)  mg/dL


 


Magnesium  2.1  (1.8-2.4)  mg/dL


 


Total Bilirubin   (0.2-1.0)  mg/dL


 


AST   (15-37)  IU/L


 


ALT   (14-63)  IU/L


 


Alkaline Phosphatase   ()  U/L


 


Troponin I   (0.000-0.056)  ng/mL


 


B-Natriuretic Peptide   (<100)  PG/ML


 


Total Protein   (6.4-8.2)  g/dL


 


Albumin   (3.4-5.0)  g/dL


 


Globulin   (2.6-4.0)  g/dL


 


Albumin/Globulin Ratio   (0.9-1.6)  


 


Urine Color   


 


Urine Appearance   


 


Urine pH   (5.0-8.0)  


 


Ur Specific Gravity   (1.001-1.035)  


 


Urine Protein   (NEGATIVE)  mg/dL


 


Urine Glucose (UA)   (NEGATIVE)  mg/dL


 


Urine Ketones   (NEGATIVE)  mg/dL


 


Urine Occult Blood   (NEGATIVE)  


 


Urine Nitrite   (NEGATIVE)  


 


Urine Bilirubin   (NEGATIVE)  


 


Urine Ictotest   


 


Urine Urobilinogen   (<2.0)  EU/dL


 


Ur Leukocyte Esterase   (NEGATIVE)  


 


Urine RBC   (0-2/HPF)  


 


Urine WBC   (0-5/HPF)  


 


Ur Epithelial Cells   (NONE-FEW)  


 


Calcium Oxalate Crystal   (NEGATIVE)  


 


Urine Bacteria   (NEGATIVE)  


 


Urine Mucus   (NONE-MOD)  


 


SARS-CoV-2 RNA (NOHEMI)   (NEGATIVE)  











Result Diagrams: 


                                 10/29/21 06:40





                                 10/29/21 06:40





Sepsis Event Note





- Evaluation


Sepsis Screening Result: No Definite Risk





- Focused Exam


Vital Signs: 


                                   Vital Signs











  Temp Pulse Resp BP Pulse Ox


 


 10/29/21 11:50  37.2 C  87  20  120/53 L  94 L


 


 10/29/21 10:31  37.4 C  89  24 H  128/61  93 L


 


 10/29/21 10:13     127/49 L  95


 


 10/29/21 10:10  37.3 C  81  18  125/47 L  96














- Problem List & Annotations


(1) Acute dyspnea


SNOMED Code(s): 928694072, 876677642


   Code(s): R06.00 - DYSPNEA, UNSPECIFIED   Status: Acute   Current Visit: Yes  







- Problem List Review


Problem List Initiated/Reviewed/Updated: Yes





- My Orders


Last 24 Hours: 


My Active Orders





10/29/21 10:04


Telemetry Monitoring [Cardiac Monitoring] [RC] .AS DIRECTED 





10/29/21 21:00


Simvastatin [Zocor]   20 mg PO BEDTIME 





10/29/21 22:00


Carbidopa/Levodopa [Sinemet  mg]   1.5 tab PO TID 


Gabapentin [Neurontin]   100 mg PO TID 





10/30/21 05:11


BASIC METABOLIC PANEL,BMP [CHEM] AM 


CBC WITH AUTO DIFF [HEME] AM 





10/30/21 09:00


Patient's Own Medication [Ptom]   1 each PO QAM 














- Plan


Plan:: 





89-year-old female admitted for shortness of breath attributed to possible fluid

 overload versus pneumonia


Pneumonia/UTI: continue levaquin


CHF: on lasix


lung nodule: patient and family informed of need for follow up.

## 2021-10-30 NOTE — PCM.DCSUM1
**Discharge Summary





- Hospital Course


Free Text/Narrative:: 


89-year-old female with history of CHF, HTN, HLD presented to the ER with 

worsening shortness of breath.  Patient was admitted for pneumonia and CHF.  

Patient was treated with Lasix and Levaquin.  Patient had improvement over the 

course of her stay and was requiring 1 to 2 L nasal cannula.  Patient also had a

UTI which was covered.  Patient CXR showed nodular opacities.  CT scan showed no

pulmonary embolism or lobar pneumonia.  New bilateral pulmonary nodules which 

may represent infection or metastatic disease, mild pulmonary venous congestions

with small bilateral pleural effusions, right hilar adenopathy and a small 

pericardial effusion.  There is also a new 1.3 cm left lower lobe pulmonary 

mass.


Discussed findings with the patient.  Recommended follow-up with PCP for further

work-up of the lung mass.  Recent echo done in August showed normal EF.  

Clinically patient stable.  Vital signs stable.  Patient discharged on Levaquin 

and Lasix.  Follow-up with PCP regarding Lasix refill and continuation.  Home 

oxygen, 1 L continuously.











- Discharge Data


Discharge Date: 10/30/21


Discharge Disposition: Home, Self-Care 01


Condition: Stable





- Referral to Home Health


Primary Care Physician: 


Leo Guzman MD








- Patient Instructions


Diet: Usual Diet as Tolerated


Activity: As Tolerated


Notify Provider of: Fever, Increased Pain


Other/Special Instructions: You were admitted for shortness of breath, fluid ove

rload and pneumonia.  You have been given a prescription for 5 days of 

antibiotics levofloxacin.  Please take 1 tablet daily until completed.  You have

been given a prescription for Lasix to be taken once daily.  You have 1 week of 

medication.  Please see your primary care doctor regarding refill and continued 

use of Lasix.  Please follow-up with cardiology.  You are being discharged on 1 

L of oxygen to be used with nasal cannula.  Please use 1 L of oxygen 

continuously.  Please see your primary care for refill if required.  As 

discussed with you, please see your PCP regarding lung nodule found on your 

scan.  If you experience worsening difficulty breathing, shortness of breath, 

coughing, difficulty laying flat, increased swelling of your legs, chest pain, 

palpitations, fever, chills, lightheadedness, loss of consciousness please seek 

medical attention immediately.





- Discharge Plan


*PRESCRIPTION DRUG MONITORING PROGRAM REVIEWED*: Not Applicable


*COPY OF PRESCRIPTION DRUG MONITORING REPORT IN PATIENT EDDA: Not Applicable


Prescriptions/Med Rec: 


Furosemide [Lasix] 20 mg PO DAILY #7 tab


Levofloxacin 500 mg PO DAILY 5 Days #5 tablet


Home Medications: 


                                    Home Meds





Sertraline HCl [Zoloft] 100 mg PO BID 08/03/18 [History]


Cholecalciferol (Vitamin D3) [Vitamin D3] 1,000 unit PO DAILY 07/17/19 [History]


Cyanocobalamin (Vitamin B12) [Vitamin B12] 1,000 mcg PO DAILY 07/17/19 [History]


Esomeprazole Magnesium [Nexium 24Hr] 20 mg PO QAM 07/17/19 [History]


Multivitamin with Minerals [Hair, Skin and Nails] 1 tab PO DAILY 07/17/19 

[History]


Carbidopa/Levodopa [Carbidopa-Levodopa ] 37.5 - 150 mg PO TID 08/05/21 

[History]


Docusate Sodium 100 mg PO DAILY 08/05/21 [History]


Gabapentin [Neurontin] 100 mg PO TID 08/05/21 [History]


Ibuprofen 200 mg PO Q6H PRN 08/05/21 [History]


Simvastatin 20 mg PO BEDTIME 08/05/21 [History]


Furosemide [Lasix] 20 mg PO DAILY #7 tab 10/30/21 [Rx]


Levofloxacin 500 mg PO DAILY 5 Days #5 tablet 10/30/21 [Rx]








Oxygen Therapy Mode: Nasal Cannula


Oxygen Flow Rate (L/min): 1


Patient Handouts:  Hypertension, Adult, Easy-to-Read, Preventing Hypertension, 

Pulmonary Nodule, Easy-to-Read, Managing Your Hypertension


Referrals: 


Leo Guzman MD [Primary Care Provider] - 


Iesha Cooney MD [Physician] - 





- Discharge Summary/Plan Comment


DC Time >30 min.: Yes


Total # of Minutes for Discharge Time: 





60





- General Info


Admission Dx/Problem (Free Text: 


                           Admission Diagnosis/Problem





Admission Diagnosis/Problem      CHF, Congestive heart failure











- Review of Systems


General: Denies: Fever, Chills


HEENT: Denies: Headaches


Pulmonary: Reports: Shortness of Breath, Cough


Cardiovascular: Denies: Chest Pain, Palpitations


Gastrointestinal: Denies: Abdominal Pain, Constipation, Diarrhea, Nausea, 

Vomiting


Genitourinary: Denies: Dysuria


Musculoskeletal: Denies: Leg Pain


Skin: Denies: Cyanosis


Neurological: Reports: Confusion





- Patient Data


Vitals - Most Recent: 


                                Last Vital Signs











Temp  99 F   10/30/21 16:00


 


Pulse  79   10/30/21 16:00


 


Resp  17   10/30/21 16:00


 


BP  115/49 L  10/30/21 16:00


 


Pulse Ox  93 L  10/30/21 16:00











Weight - Most Recent: 148 lb 1.6 oz


I&O - Last 24 hours: 


                                 Intake & Output











 10/30/21 10/30/21 10/30/21





 06:59 14:59 22:59


 


Intake Total 200  810


 


Output Total 550  0


 


Balance -350  810











Lab Results - Last 24 hrs: 


                         Laboratory Results - last 24 hr











  10/30/21 10/30/21 Range/Units





  06:50 06:50 


 


WBC  2.39 L   (4.0-11.0)  K/uL


 


RBC  2.49 L   (4.30-5.90)  M/uL


 


Hgb  7.2 L   (12.0-16.0)  g/dL


 


Hct  21.6 L   (36.0-46.0)  %


 


MCV  86.7   (80.0-98.0)  fL


 


MCH  28.9   (27.0-32.0)  pg


 


MCHC  33.3   (31.0-37.0)  g/dL


 


RDW Std Deviation  58.7   (28.0-62.0)  fl


 


RDW Coeff of Brody  19 H   (11.0-15.0)  %


 


Plt Count  131 L   (150-400)  K/uL


 


MPV  10.70   (7.40-12.00)  fL


 


Neut % (Auto)  70.7   (48.0-80.0)  %


 


Lymph % (Auto)  20.1   (16.0-40.0)  %


 


Mono % (Auto)  7.1   (0.0-15.0)  %


 


Eos % (Auto)  2.1   (0.0-7.0)  %


 


Baso % (Auto)  0.0   (0.0-1.5)  %


 


Neut # (Auto)  1.7   (1.4-5.7)  K/uL


 


Lymph # (Auto)  0.5 L   (0.6-2.4)  K/uL


 


Mono # (Auto)  0.2   (0.0-0.8)  K/uL


 


Eos # (Auto)  0.1   (0.0-0.7)  K/uL


 


Baso # (Auto)  0.0   (0.0-0.1)  K/uL


 


Nucleated RBC %  0.0   /100WBC


 


Nucleated RBCs #  0   K/uL


 


Sodium   138  (136-145)  mmol/L


 


Potassium   3.3 L  (3.5-5.1)  mmol/L


 


Chloride   101  ()  mmol/L


 


Carbon Dioxide   28.6  (21.0-32.0)  mmol/L


 


BUN   15  (7.0-18.0)  mg/dL


 


Creatinine   0.8  (0.6-1.0)  mg/dL


 


Est Cr Clr Drug Dosing   41.17  mL/min


 


Estimated GFR (MDRD)   > 60.0  ml/min


 


Glucose   118 H  ()  mg/dL


 


Calcium   9.0  (8.5-10.1)  mg/dL











GABRIELA Results - Last 24 hrs: 


                                  Microbiology











 10/28/21 18:27 Aerobic Blood Culture - Preliminary





 Blood - Venous - Lab Draw    NO GROWTH AFTER 1 DAY





 Anaerobic Blood Culture - Preliminary





    NO GROWTH AFTER 1 DAY


 


 10/28/21 18:23 Aerobic Blood Culture - Preliminary





 Blood - Venous    NO GROWTH AFTER 1 DAY





 Anaerobic Blood Culture - Preliminary





    NO GROWTH AFTER 1 DAY











Med Orders - Current: 


                               Current Medications





Acetaminophen (Acetaminophen 325 Mg Tab)  650 mg PO Q4H PRN


   PRN Reason: Pain (Mild 1-3)/fever


   Last Admin: 10/30/21 14:51 Dose:  650 mg


   Documented by: 


Albuterol/Ipratropium (Albuterol/Ipratropium 3.0-0.5 Mg/3 Ml Neb Soln)  3 ml NEB

 Q4HRRT PRN


   PRN Reason: Shortness Of Breath/wheezing


Aspirin (Aspirin 81 Mg Tab.Chew)  162 mg PO DAILY [facility]


   Last Admin: 10/30/21 09:58 Dose:  162 mg


   Documented by: 


Carbidopa/Levodopa (Carbidopa/Levodopa  Mg Tab)  1.5 tab PO TID [facility]


   Last Admin: 10/30/21 13:45 Dose:  1.5 tab


   Documented by: 


Furosemide (Furosemide 20 Mg/2 Ml Vial)  20 mg IVPUSH DAILY [facility]


   Last Admin: 10/30/21 09:58 Dose:  20 mg


   Documented by: 


Gabapentin (Gabapentin 100 Mg Cap)  100 mg PO TID [facility]


   Last Admin: 10/30/21 13:45 Dose:  100 mg


   Documented by: 


Heparin Sodium (Porcine) (Heparin Sodium 5,000 Units/Ml Vial)  5,000 units 

SUBCUT Q8H [facility]


   Last Admin: 10/30/21 12:39 Dose:  5,000 units


   Documented by: 


Levofloxacin/Dextrose 750 mg/ (Premix)  150 mls @ 100 mls/hr IV Q24H [facility]


   Last Admin: 10/30/21 09:58 Dose:  100 mls/hr


   Documented by: 


Ondansetron HCl (Ondansetron 4 Mg/2 Ml Sdv)  4 mg IVPUSH Q4H PRN


   PRN Reason: Nausea/Vomiting


Pantoprazole Sodium (Pantoprazole 40 Mg Vial)  40 mg IV Q24H [facility]


   Last Admin: 10/29/21 20:46 Dose:  40 mg


   Documented by: 


Esomeprazole Magnesium [Nexium 24hr] 20 Mg Tablet.   1 each PO QAM [facility]


   Last Admin: 10/30/21 09:58 Dose:  Not Given


   Documented by: 


Sertraline HCl (Sertraline 100 Mg Tab)  100 mg PO BID [facility]


   Last Admin: 10/30/21 09:58 Dose:  100 mg


   Documented by: 


Simvastatin (Simvastatin 20 Mg Tab)  20 mg PO BEDTIME [facility]


   Last Admin: 10/29/21 20:57 Dose:  20 mg


   Documented by: 


Sodium Chloride (Sodium Chloride 0.9% 10 Ml Syringe)  10 ml FLUSH ASDIRECTED PRN


   PRN Reason: Keep Vein Open


   Last Admin: 10/28/21 16:38 Dose:  10 ml


   Documented by: 


Sodium Chloride (Sodium Chloride 0.9% 2.5 Ml Syringe)  2.5 ml FLUSH ASDIRECTED 

PRN


   PRN Reason: Keep Vein Open


   Last Admin: 10/28/21 16:38 Dose:  2.5 ml


   Documented by: 





Discontinued Medications





Furosemide (Furosemide 40 Mg/4 Ml Vial) Confirm Administered Dose 40 mg .ROUTE 

.[facility]


   Stop: 10/28/21 19:13


   Last Admin: 10/28/21 19:20 Dose:  Not Given


   Documented by: 


Furosemide (Furosemide 40 Mg/4 Ml Vial)  20 mg IVPUSH NOW ONE


   Stop: 10/28/21 19:19


   Last Admin: 10/28/21 19:22 Dose:  40 mg


   Documented by: 


Piperacillin Sod/Tazobactam (Sod 3.375 gm/ Sodium Chloride)  50 mls @ 100 mls/hr

IV ONETIME ONE


   Stop: 10/28/21 18:13


   Last Admin: 10/28/21 18:22 Dose:  100 mls/hr


   Documented by: 


Vancomycin HCl 1.25 gm/ Premix  250 mls @ 166.667 mls/hr IV ONETIME ONE


   Stop: 10/28/21 19:59


   Last Admin: 10/28/21 18:22 Dose:  166.667 mls/hr


   Documented by: 


Furosemide 20 mg/ Sodium (Chloride)  52 mls @ 100 mls/hr IV NOW STA


   Stop: 10/28/21 19:32


   Last Admin: 10/28/21 19:14 Dose:  Not Given


   Documented by: 


Iopamidol (Iopamidol 755 Mg/Ml 500 Ml Multipack Bottle)  75 ml IVPUSH ONETIME 

[facility]


   Stop: 10/28/21 19:01


   Last Admin: 10/28/21 19:01 Dose:  75 ml


   Documented by: 


Vancomycin HCl (Pharmacy To Dose - Vancomycin)  1 dose .XX ONETIME ONE


   Stop: 10/28/21 17:46


   Last Admin: 10/28/21 18:23 Dose:  Not Given


   Documented by: 











- Exam


Quality Assessment: Reports: Supplemental Oxygen


General: Reports: Alert, Cooperative, No Acute Distress


HEENT: Reports: Pupils Equal, Pupils Reactive


Neck: Reports: Supple, Trachea Midline, No JVD


Lungs: Reports: Decreased Breath Sounds


Cardiovascular: Reports: Regular Rate


GI/Abdominal Exam: Normal Bowel Sounds, Soft, Non-Tender


Back Exam: Reports: Normal Inspection


Extremities: Normal Inspection, No Pedal Edema.  No: Duy's Sign, Leg Pain


Neurological: Reports: No New Focal Deficit

## 2021-10-30 NOTE — PCM.PN
- General Info


Date of Service: 10/30/21





- Patient Data


Vitals - Most Recent: 


                                Last Vital Signs











Temp  97.6 F   10/30/21 04:54


 


Pulse  90   10/30/21 04:54


 


Resp  17   10/30/21 04:54


 


BP  138/61   10/30/21 04:54


 


Pulse Ox  95   10/30/21 04:54











Weight - Most Recent: 148 lb 1.6 oz


I&O - Last 24 Hours: 


                                 Intake & Output











 10/29/21 10/30/21 10/30/21





 22:59 06:59 14:59


 


Intake Total 750 200 


 


Output Total 1200 550 


 


Balance -450 -350 











Lab Results Last 24 Hours: 


                         Laboratory Results - last 24 hr











  10/29/21 10/29/21 10/30/21 Range/Units





  06:40 06:40 06:50 


 


WBC  2.49 L   2.39 L  (4.0-11.0)  K/uL


 


RBC  2.70 L   2.49 L  (4.30-5.90)  M/uL


 


Hgb  7.8 L   7.2 L  (12.0-16.0)  g/dL


 


Hct  23.8 L   21.6 L  (36.0-46.0)  %


 


MCV  88.1   86.7  (80.0-98.0)  fL


 


MCH  28.9   28.9  (27.0-32.0)  pg


 


MCHC  32.8   33.3  (31.0-37.0)  g/dL


 


RDW Std Deviation  61.3   58.7  (28.0-62.0)  fl


 


RDW Coeff of Brody  19 H   19 H  (11.0-15.0)  %


 


Plt Count  133 L   131 L  (150-400)  K/uL


 


MPV  10.60   10.70  (7.40-12.00)  fL


 


Neut % (Auto)  73.9   70.7  (48.0-80.0)  %


 


Lymph % (Auto)  18.9   20.1  (16.0-40.0)  %


 


Mono % (Auto)  5.2   7.1  (0.0-15.0)  %


 


Eos % (Auto)  2.0   2.1  (0.0-7.0)  %


 


Baso % (Auto)  0.0   0.0  (0.0-1.5)  %


 


Neut # (Auto)  1.8   1.7  (1.4-5.7)  K/uL


 


Lymph # (Auto)  0.5 L   0.5 L  (0.6-2.4)  K/uL


 


Mono # (Auto)  0.1   0.2  (0.0-0.8)  K/uL


 


Eos # (Auto)  0.1   0.1  (0.0-0.7)  K/uL


 


Baso # (Auto)  0.0   0.0  (0.0-0.1)  K/uL


 


Nucleated RBC %  0.0   0.0  /100WBC


 


Nucleated RBCs #  0   0  K/uL


 


Sodium   142   (136-145)  mmol/L


 


Potassium   3.5   (3.5-5.1)  mmol/L


 


Chloride   104   ()  mmol/L


 


Carbon Dioxide   27.2   (21.0-32.0)  mmol/L


 


BUN   16   (7.0-18.0)  mg/dL


 


Creatinine   0.8   (0.6-1.0)  mg/dL


 


Est Cr Clr Drug Dosing   41.17   mL/min


 


Estimated GFR (MDRD)   > 60.0   ml/min


 


Glucose   142 H   ()  mg/dL


 


Calcium   9.3   (8.5-10.1)  mg/dL


 


Phosphorus   2.8   (2.6-4.7)  mg/dL


 


Magnesium   2.1   (1.8-2.4)  mg/dL











Filiberto Results Last 24 Hours: 


                                  Microbiology











 10/28/21 18:27 Aerobic Blood Culture - Preliminary





 Blood - Venous - Lab Draw    NO GROWTH AFTER 1 DAY





 Anaerobic Blood Culture - Preliminary





    NO GROWTH AFTER 1 DAY


 


 10/28/21 18:23 Aerobic Blood Culture - Preliminary





 Blood - Venous    NO GROWTH AFTER 1 DAY





 Anaerobic Blood Culture - Preliminary





    NO GROWTH AFTER 1 DAY











Med Orders - Current: 


                               Current Medications





Acetaminophen (Acetaminophen 325 Mg Tab)  650 mg PO Q4H PRN


   PRN Reason: Pain (Mild 1-3)/fever


   Last Admin: 10/30/21 04:42 Dose:  650 mg


   Documented by: 


Albuterol/Ipratropium (Albuterol/Ipratropium 3.0-0.5 Mg/3 Ml Neb Soln)  3 ml NEB

Q4HRRT PRN


   PRN Reason: Shortness Of Breath/wheezing


Aspirin (Aspirin 81 Mg Tab.Chew)  162 mg PO DAILY [facility]


   Last Admin: 10/29/21 10:13 Dose:  162 mg


   Documented by: 


Carbidopa/Levodopa (Carbidopa/Levodopa  Mg Tab)  1.5 tab PO TID [facility]


   Last Admin: 10/30/21 05:50 Dose:  1.5 tab


   Documented by: 


Furosemide (Furosemide 20 Mg/2 Ml Vial)  20 mg IVPUSH DAILY [facility]


   Last Admin: 10/29/21 10:14 Dose:  20 mg


   Documented by: 


Gabapentin (Gabapentin 100 Mg Cap)  100 mg PO TID [facility]


   Last Admin: 10/30/21 05:50 Dose:  100 mg


   Documented by: 


Heparin Sodium (Porcine) (Heparin Sodium 5,000 Units/Ml Vial)  5,000 units 

SUBCUT Q8H [facility]


   Last Admin: 10/30/21 04:37 Dose:  5,000 units


   Documented by: 


Levofloxacin/Dextrose 750 mg/ (Premix)  150 mls @ 100 mls/hr IV Q24H [facility]


   Last Admin: 10/29/21 10:13 Dose:  100 mls/hr


   Documented by: 


Ondansetron HCl (Ondansetron 4 Mg/2 Ml Sdv)  4 mg IVPUSH Q4H PRN


   PRN Reason: Nausea/Vomiting


Pantoprazole Sodium (Pantoprazole 40 Mg Vial)  40 mg IV Q24H [facility]


   Last Admin: 10/29/21 20:46 Dose:  40 mg


   Documented by: 


Esomeprazole Magnesium [Nexium 24hr] 20 Mg Tablet.   1 each PO QAM [facility]


Sertraline HCl (Sertraline 100 Mg Tab)  100 mg PO BID [facility]


   Last Admin: 10/29/21 20:57 Dose:  100 mg


   Documented by: 


Simvastatin (Simvastatin 20 Mg Tab)  20 mg PO BEDTIME [facility]


   Last Admin: 10/29/21 20:57 Dose:  20 mg


   Documented by: 


Sodium Chloride (Sodium Chloride 0.9% 10 Ml Syringe)  10 ml FLUSH ASDIRECTED PRN


   PRN Reason: Keep Vein Open


   Last Admin: 10/28/21 16:38 Dose:  10 ml


   Documented by: 


Sodium Chloride (Sodium Chloride 0.9% 2.5 Ml Syringe)  2.5 ml FLUSH ASDIRECTED 

PRN


   PRN Reason: Keep Vein Open


   Last Admin: 10/28/21 16:38 Dose:  2.5 ml


   Documented by: 





Discontinued Medications





Furosemide (Furosemide 40 Mg/4 Ml Vial) Confirm Administered Dose 40 mg .ROUTE 

.[facility]-MED ONE


   Stop: 10/28/21 19:13


   Last Admin: 10/28/21 19:20 Dose:  Not Given


   Documented by: 


Furosemide (Furosemide 40 Mg/4 Ml Vial)  20 mg IVPUSH NOW ONE


   Stop: 10/28/21 19:19


   Last Admin: 10/28/21 19:22 Dose:  40 mg


   Documented by: 


Piperacillin Sod/Tazobactam (Sod 3.375 gm/ Sodium Chloride)  50 mls @ 100 mls/hr

IV ONETIME ONE


   Stop: 10/28/21 18:13


   Last Admin: 10/28/21 18:22 Dose:  100 mls/hr


   Documented by: 


Vancomycin HCl 1.25 gm/ Premix  250 mls @ 166.667 mls/hr IV ONETIME ONE


   Stop: 10/28/21 19:59


   Last Admin: 10/28/21 18:22 Dose:  166.667 mls/hr


   Documented by: 


Furosemide 20 mg/ Sodium (Chloride)  52 mls @ 100 mls/hr IV NOW [facility]


   Stop: 10/28/21 19:32


   Last Admin: 10/28/21 19:14 Dose:  Not Given


   Documented by: 


Iopamidol (Iopamidol 755 Mg/Ml 500 Ml Multipack Bottle)  75 ml IVPUSH ONETIME 

[facility]


   Stop: 10/28/21 19:01


   Last Admin: 10/28/21 19:01 Dose:  75 ml


   Documented by: 


Vancomycin HCl (Pharmacy To Dose - Vancomycin)  1 dose .XX ONETIME [facility]


   Stop: 10/28/21 17:46


   Last Admin: 10/28/21 18:23 Dose:  Not Given


   Documented by: 











- Patient Data


Lab Results Last 24 hrs: 


                         Laboratory Results - last 24 hr











  10/29/21 10/29/21 10/30/21 Range/Units





  06:40 06:40 06:50 


 


WBC  2.49 L   2.39 L  (4.0-11.0)  K/uL


 


RBC  2.70 L   2.49 L  (4.30-5.90)  M/uL


 


Hgb  7.8 L   7.2 L  (12.0-16.0)  g/dL


 


Hct  23.8 L   21.6 L  (36.0-46.0)  %


 


MCV  88.1   86.7  (80.0-98.0)  fL


 


MCH  28.9   28.9  (27.0-32.0)  pg


 


MCHC  32.8   33.3  (31.0-37.0)  g/dL


 


RDW Std Deviation  61.3   58.7  (28.0-62.0)  fl


 


RDW Coeff of Brody  19 H   19 H  (11.0-15.0)  %


 


Plt Count  133 L   131 L  (150-400)  K/uL


 


MPV  10.60   10.70  (7.40-12.00)  fL


 


Neut % (Auto)  73.9   70.7  (48.0-80.0)  %


 


Lymph % (Auto)  18.9   20.1  (16.0-40.0)  %


 


Mono % (Auto)  5.2   7.1  (0.0-15.0)  %


 


Eos % (Auto)  2.0   2.1  (0.0-7.0)  %


 


Baso % (Auto)  0.0   0.0  (0.0-1.5)  %


 


Neut # (Auto)  1.8   1.7  (1.4-5.7)  K/uL


 


Lymph # (Auto)  0.5 L   0.5 L  (0.6-2.4)  K/uL


 


Mono # (Auto)  0.1   0.2  (0.0-0.8)  K/uL


 


Eos # (Auto)  0.1   0.1  (0.0-0.7)  K/uL


 


Baso # (Auto)  0.0   0.0  (0.0-0.1)  K/uL


 


Nucleated RBC %  0.0   0.0  /100WBC


 


Nucleated RBCs #  0   0  K/uL


 


Sodium   142   (136-145)  mmol/L


 


Potassium   3.5   (3.5-5.1)  mmol/L


 


Chloride   104   ()  mmol/L


 


Carbon Dioxide   27.2   (21.0-32.0)  mmol/L


 


BUN   16   (7.0-18.0)  mg/dL


 


Creatinine   0.8   (0.6-1.0)  mg/dL


 


Est Cr Clr Drug Dosing   41.17   mL/min


 


Estimated GFR (MDRD)   > 60.0   ml/min


 


Glucose   142 H   ()  mg/dL


 


Calcium   9.3   (8.5-10.1)  mg/dL


 


Phosphorus   2.8   (2.6-4.7)  mg/dL


 


Magnesium   2.1   (1.8-2.4)  mg/dL











Result Diagrams: 


                                 10/30/21 06:50





                                 10/29/21 06:40


[facility] Results Last 24 hrs: 


                                  Microbiology











 10/28/21 18:27 Aerobic Blood Culture - Preliminary





 Blood - Venous - Lab Draw    NO GROWTH AFTER 1 DAY





 Anaerobic Blood Culture - Preliminary





    NO GROWTH AFTER 1 DAY


 


 10/28/21 18:23 Aerobic Blood Culture - Preliminary





 Blood - Venous    NO GROWTH AFTER 1 DAY





 Anaerobic Blood Culture - Preliminary





    NO GROWTH AFTER 1 DAY














Sepsis Event Note





- Evaluation


Sepsis Screening Result: No Definite Risk





- Focused Exam


Vital Signs: 


                                   Vital Signs











  Temp Pulse Resp BP Pulse Ox


 


 10/30/21 04:54  97.6 F  90  17  138/61  95


 


 10/30/21 00:00  97.4 F  79  18  124/57 L  93 L














- My Orders


Last 24 Hours: 


My Active Orders





10/29/21 13:30


Sertraline [Zoloft]   100 mg PO BID 














- Plan


Plan:: 





89-year-old female admitted for shortness of breath attributed to possible fluid

 overload versus pneumonia


Pneumonia/UTI: continue levaquin


CHF: on lasix


lung nodule: patient and family informed of need for follow up.